# Patient Record
Sex: FEMALE | Race: WHITE | NOT HISPANIC OR LATINO | Employment: FULL TIME | URBAN - METROPOLITAN AREA
[De-identification: names, ages, dates, MRNs, and addresses within clinical notes are randomized per-mention and may not be internally consistent; named-entity substitution may affect disease eponyms.]

---

## 2018-11-07 ENCOUNTER — HOSPITAL ENCOUNTER (EMERGENCY)
Facility: HOSPITAL | Age: 48
Discharge: HOME | End: 2018-11-07
Attending: EMERGENCY MEDICINE

## 2018-11-07 VITALS
SYSTOLIC BLOOD PRESSURE: 155 MMHG | TEMPERATURE: 96 F | BODY MASS INDEX: 28.63 KG/M2 | OXYGEN SATURATION: 97 % | HEART RATE: 118 BPM | HEIGHT: 70 IN | WEIGHT: 200 LBS | DIASTOLIC BLOOD PRESSURE: 75 MMHG | RESPIRATION RATE: 18 BRPM

## 2018-11-07 DIAGNOSIS — T78.40XA ALLERGIC REACTION, INITIAL ENCOUNTER: Primary | ICD-10-CM

## 2018-11-07 PROCEDURE — 99284 EMERGENCY DEPT VISIT MOD MDM: CPT | Mod: 25

## 2018-11-07 PROCEDURE — 25000003 PHARM REV CODE 250: Performed by: EMERGENCY MEDICINE

## 2018-11-07 PROCEDURE — 63600175 PHARM REV CODE 636 W HCPCS: Performed by: EMERGENCY MEDICINE

## 2018-11-07 PROCEDURE — 96372 THER/PROPH/DIAG INJ SC/IM: CPT

## 2018-11-07 RX ORDER — DIPHENHYDRAMINE HCL 50 MG
50 CAPSULE ORAL
Status: COMPLETED | OUTPATIENT
Start: 2018-11-07 | End: 2018-11-07

## 2018-11-07 RX ORDER — DEXAMETHASONE SODIUM PHOSPHATE 4 MG/ML
12 INJECTION, SOLUTION INTRA-ARTICULAR; INTRALESIONAL; INTRAMUSCULAR; INTRAVENOUS; SOFT TISSUE
Status: COMPLETED | OUTPATIENT
Start: 2018-11-07 | End: 2018-11-07

## 2018-11-07 RX ORDER — FAMOTIDINE 20 MG/1
20 TABLET, FILM COATED ORAL 2 TIMES DAILY
Qty: 20 TABLET | Refills: 0 | Status: ON HOLD | OUTPATIENT
Start: 2018-11-07 | End: 2020-12-10

## 2018-11-07 RX ORDER — METHYLPREDNISOLONE 4 MG/1
TABLET ORAL
Qty: 1 PACKAGE | Refills: 0 | Status: SHIPPED | OUTPATIENT
Start: 2018-11-07 | End: 2018-11-28

## 2018-11-07 RX ORDER — FAMOTIDINE 20 MG/1
40 TABLET, FILM COATED ORAL
Status: COMPLETED | OUTPATIENT
Start: 2018-11-07 | End: 2018-11-07

## 2018-11-07 RX ORDER — DIPHENHYDRAMINE HCL 25 MG
25 CAPSULE ORAL EVERY 6 HOURS PRN
Qty: 20 CAPSULE | Refills: 0 | Status: ON HOLD | COMMUNITY
Start: 2018-11-07 | End: 2020-12-11

## 2018-11-07 RX ADMIN — DIPHENHYDRAMINE HYDROCHLORIDE 50 MG: 50 CAPSULE ORAL at 09:11

## 2018-11-07 RX ADMIN — DEXAMETHASONE SODIUM PHOSPHATE 12 MG: 4 INJECTION, SOLUTION INTRA-ARTICULAR; INTRALESIONAL; INTRAMUSCULAR; INTRAVENOUS; SOFT TISSUE at 09:11

## 2018-11-07 RX ADMIN — FAMOTIDINE 40 MG: 20 TABLET ORAL at 09:11

## 2018-11-08 NOTE — ED PROVIDER NOTES
Encounter Date: 11/7/2018    SCRIBE #1 NOTE: I, Barbara Rodrigues, am scribing for, and in the presence of, Dr. Flowers.       History     Chief Complaint   Patient presents with    Allergic Reaction     face and neck swelling, itching and redness since last night - worsening today       Time seen by provider: 9:09 PM on 11/07/2018    The patient is a 48 y.o. female who presents to the ED with complaint of sudden onset of hives on her face this morning with associated facial swelling, itchiness, redness, and sore throat. She denies chemical exposure, bug bites, new foods, or recent medication change. She reports she went to a [employer] yesterday. Benadryl has not provided relief. The patient denies difficulty swallowing or any other symptoms at this time. No PMHx noted. PSHx of hysterectomy noted. No Hx of DM.       The history is provided by the patient and a relative.     Review of patient's allergies indicates:   Allergen Reactions    Penicillins      History reviewed. No pertinent past medical history.  Past Surgical History:   Procedure Laterality Date    HYSTERECTOMY       History reviewed. No pertinent family history.  Social History     Tobacco Use    Smoking status: Current Some Day Smoker   Substance Use Topics    Alcohol use: No     Frequency: Never    Drug use: No     Review of Systems   Constitutional: Negative for fever.   HENT: Positive for facial swelling and sore throat.    Respiratory: Negative for shortness of breath.    Cardiovascular: Negative for chest pain.   Gastrointestinal: Negative for nausea.   Genitourinary: Negative for dysuria.   Musculoskeletal: Negative for back pain.   Skin: Positive for color change (erythema) and rash.        +pruritus   Neurological: Negative for weakness.   Hematological: Does not bruise/bleed easily.       Physical Exam     Initial Vitals [11/07/18 2021]   BP Pulse Resp Temp SpO2   (!) 155/75 (!) 118 18 96.4 °F (35.8 °C) 97 %      MAP       --          Physical Exam    Nursing note and vitals reviewed.  Constitutional: She appears well-developed and well-nourished.  Non-toxic appearance. No distress.   HENT:   Head: Normocephalic and atraumatic.   Mouth/Throat: Oropharynx is clear and moist.   Eyes: EOM are normal. Pupils are equal, round, and reactive to light.   Neck: Normal range of motion. Neck supple. No neck rigidity. No JVD present.   Cardiovascular: Normal rate, regular rhythm, normal heart sounds and intact distal pulses. Exam reveals no gallop and no friction rub.    No murmur heard.  Pulmonary/Chest: Breath sounds normal. She has no wheezes. She has no rhonchi. She has no rales.   Abdominal: Soft. Bowel sounds are normal. She exhibits no distension. There is no tenderness. There is no rigidity, no rebound and no guarding.   Musculoskeletal: Normal range of motion.   Neurological: She is alert and oriented to person, place, and time. She has normal strength and normal reflexes. No cranial nerve deficit or sensory deficit. She exhibits normal muscle tone. Coordination normal. GCS eye subscore is 4. GCS verbal subscore is 5. GCS motor subscore is 6.   Skin: Skin is warm and dry.   Swelling and erythema with hives to face and neck.   Psychiatric: She has a normal mood and affect. Her speech is normal and behavior is normal. She is not actively hallucinating.         ED Course   Procedures  Labs Reviewed - No data to display       Imaging Results    None          Medical Decision Making:   History:   Old Medical Records: I decided to obtain old medical records.  Initial Assessment:   This is an emergent evaluation for rash, ALLERGIC reaction  Differential diagnosis includes but is not limited to Anaphylaxis, infectious urticaria, angioedema, contact dermatitis, ALLERGIC dermatitis  My impression is urticaria    The patient has a rash that is consistent with urticaria.  She has no other organ involvement no wheezing, shortness of breath, abdominal pain,  nausea, vomiting or any other complaints.   Symptoms have resolved after given steroids, Benadryl, and H2 blocker.  Patient will be discharged home with a course of steroids, Benadryl, and H2 blocker.  I will provide ALLERGY and immunology referral if indicated. Patient is given strict return precautions to the ED if she starts to develop any other secondary symptoms.  They are discharged improved and in no acute distress    Len Flowers MD              Scribe Attestation:   Scribe #1: I performed the above scribed service and the documentation accurately describes the services I performed. I attest to the accuracy of the note.    I, Lionel Child, personally performed the services described in this documentation. All medical record entries made by the scribe were at my direction and in my presence.  I have reviewed the chart and agree that the record reflects my personal performance and is accurate and complete. Len Flowers MD.  1:47 AM 11/08/2018             Clinical Impression:   The encounter diagnosis was Allergic reaction, initial encounter.                             Len Flowers MD  11/08/18 0147

## 2020-11-23 ENCOUNTER — HOSPITAL ENCOUNTER (INPATIENT)
Facility: HOSPITAL | Age: 50
LOS: 1 days | Discharge: HOME | DRG: 661 | End: 2020-11-24
Attending: EMERGENCY MEDICINE | Admitting: INTERNAL MEDICINE

## 2020-11-23 DIAGNOSIS — Z01.818 PRE-OP EXAM: ICD-10-CM

## 2020-11-23 DIAGNOSIS — N20.0 KIDNEY STONE: Primary | ICD-10-CM

## 2020-11-23 DIAGNOSIS — N13.9 OBSTRUCTIVE UROPATHY: ICD-10-CM

## 2020-11-23 PROBLEM — R52 INTRACTABLE PAIN: Status: ACTIVE | Noted: 2020-11-23

## 2020-11-23 PROBLEM — F17.200 NICOTINE USE DISORDER: Status: ACTIVE | Noted: 2020-11-23

## 2020-11-23 PROBLEM — N13.2 HYDRONEPHROSIS WITH URINARY OBSTRUCTION DUE TO URETERAL CALCULUS: Status: ACTIVE | Noted: 2020-11-23

## 2020-11-23 LAB
ALBUMIN SERPL BCP-MCNC: 4.1 G/DL (ref 3.5–5.2)
ALP SERPL-CCNC: 87 U/L (ref 55–135)
ALT SERPL W/O P-5'-P-CCNC: 21 U/L (ref 10–44)
ANION GAP SERPL CALC-SCNC: 9 MMOL/L (ref 8–16)
AST SERPL-CCNC: 15 U/L (ref 10–40)
BACTERIA #/AREA URNS HPF: NEGATIVE /HPF
BASOPHILS # BLD AUTO: 0.05 K/UL (ref 0–0.2)
BASOPHILS NFR BLD: 0.5 % (ref 0–1.9)
BILIRUB SERPL-MCNC: 0.7 MG/DL (ref 0.1–1)
BILIRUB UR QL STRIP: NEGATIVE
BUN SERPL-MCNC: 16 MG/DL (ref 6–20)
CALCIUM SERPL-MCNC: 9.6 MG/DL (ref 8.7–10.5)
CHLORIDE SERPL-SCNC: 106 MMOL/L (ref 95–110)
CLARITY UR: CLEAR
CO2 SERPL-SCNC: 25 MMOL/L (ref 23–29)
COLOR UR: YELLOW
CREAT SERPL-MCNC: 0.7 MG/DL (ref 0.5–1.4)
DIFFERENTIAL METHOD: ABNORMAL
EOSINOPHIL # BLD AUTO: 0.1 K/UL (ref 0–0.5)
EOSINOPHIL NFR BLD: 1.2 % (ref 0–8)
ERYTHROCYTE [DISTWIDTH] IN BLOOD BY AUTOMATED COUNT: 12.2 % (ref 11.5–14.5)
EST. GFR  (AFRICAN AMERICAN): >60 ML/MIN/1.73 M^2
EST. GFR  (NON AFRICAN AMERICAN): >60 ML/MIN/1.73 M^2
GLUCOSE SERPL-MCNC: 109 MG/DL (ref 70–110)
GLUCOSE UR QL STRIP: NEGATIVE
HCT VFR BLD AUTO: 42.5 % (ref 37–48.5)
HGB BLD-MCNC: 14 G/DL (ref 12–16)
HGB UR QL STRIP: ABNORMAL
HYALINE CASTS #/AREA URNS LPF: 7 /LPF
IMM GRANULOCYTES # BLD AUTO: 0.02 K/UL (ref 0–0.04)
IMM GRANULOCYTES NFR BLD AUTO: 0.2 % (ref 0–0.5)
KETONES UR QL STRIP: NEGATIVE
LEUKOCYTE ESTERASE UR QL STRIP: ABNORMAL
LYMPHOCYTES # BLD AUTO: 4.3 K/UL (ref 1–4.8)
LYMPHOCYTES NFR BLD: 42.4 % (ref 18–48)
MCH RBC QN AUTO: 32.6 PG (ref 27–31)
MCHC RBC AUTO-ENTMCNC: 32.9 G/DL (ref 32–36)
MCV RBC AUTO: 99 FL (ref 82–98)
MICROSCOPIC COMMENT: ABNORMAL
MONOCYTES # BLD AUTO: 1 K/UL (ref 0.3–1)
MONOCYTES NFR BLD: 10 % (ref 4–15)
NEUTROPHILS # BLD AUTO: 4.6 K/UL (ref 1.8–7.7)
NEUTROPHILS NFR BLD: 45.7 % (ref 38–73)
NITRITE UR QL STRIP: NEGATIVE
NRBC BLD-RTO: 0 /100 WBC
PH UR STRIP: 7 [PH] (ref 5–8)
PLATELET # BLD AUTO: 257 K/UL (ref 150–350)
PMV BLD AUTO: 9.6 FL (ref 9.2–12.9)
POTASSIUM SERPL-SCNC: 3.8 MMOL/L (ref 3.5–5.1)
PROT SERPL-MCNC: 6.7 G/DL (ref 6–8.4)
PROT UR QL STRIP: ABNORMAL
RBC # BLD AUTO: 4.3 M/UL (ref 4–5.4)
RBC #/AREA URNS HPF: 4 /HPF (ref 0–4)
SARS-COV-2 RDRP RESP QL NAA+PROBE: NEGATIVE
SODIUM SERPL-SCNC: 140 MMOL/L (ref 136–145)
SP GR UR STRIP: 1.01 (ref 1–1.03)
SQUAMOUS #/AREA URNS HPF: 2 /HPF
URN SPEC COLLECT METH UR: ABNORMAL
UROBILINOGEN UR STRIP-ACNC: NEGATIVE EU/DL
WBC # BLD AUTO: 10.07 K/UL (ref 3.9–12.7)
WBC #/AREA URNS HPF: 17 /HPF (ref 0–5)

## 2020-11-23 PROCEDURE — 12000002 HC ACUTE/MED SURGE SEMI-PRIVATE ROOM

## 2020-11-23 PROCEDURE — 25000003 PHARM REV CODE 250: Performed by: NURSE PRACTITIONER

## 2020-11-23 PROCEDURE — 36415 COLL VENOUS BLD VENIPUNCTURE: CPT

## 2020-11-23 PROCEDURE — 93010 EKG 12-LEAD: ICD-10-PCS | Mod: ,,, | Performed by: SPECIALIST

## 2020-11-23 PROCEDURE — 93005 ELECTROCARDIOGRAM TRACING: CPT | Performed by: SPECIALIST

## 2020-11-23 PROCEDURE — 99285 EMERGENCY DEPT VISIT HI MDM: CPT | Mod: 25

## 2020-11-23 PROCEDURE — 81001 URINALYSIS AUTO W/SCOPE: CPT

## 2020-11-23 PROCEDURE — 80053 COMPREHEN METABOLIC PANEL: CPT

## 2020-11-23 PROCEDURE — U0002 COVID-19 LAB TEST NON-CDC: HCPCS

## 2020-11-23 PROCEDURE — 63600175 PHARM REV CODE 636 W HCPCS: Performed by: EMERGENCY MEDICINE

## 2020-11-23 PROCEDURE — 93010 ELECTROCARDIOGRAM REPORT: CPT | Mod: ,,, | Performed by: SPECIALIST

## 2020-11-23 PROCEDURE — 96374 THER/PROPH/DIAG INJ IV PUSH: CPT

## 2020-11-23 PROCEDURE — 96375 TX/PRO/DX INJ NEW DRUG ADDON: CPT

## 2020-11-23 PROCEDURE — 87086 URINE CULTURE/COLONY COUNT: CPT

## 2020-11-23 PROCEDURE — 85025 COMPLETE CBC W/AUTO DIFF WBC: CPT

## 2020-11-23 RX ORDER — ACETAMINOPHEN 325 MG/1
650 TABLET ORAL EVERY 4 HOURS PRN
Status: DISCONTINUED | OUTPATIENT
Start: 2020-11-23 | End: 2020-11-24 | Stop reason: HOSPADM

## 2020-11-23 RX ORDER — POTASSIUM CHLORIDE 7.45 MG/ML
40 INJECTION INTRAVENOUS
Status: DISCONTINUED | OUTPATIENT
Start: 2020-11-23 | End: 2020-11-24 | Stop reason: HOSPADM

## 2020-11-23 RX ORDER — POTASSIUM CHLORIDE 7.45 MG/ML
20 INJECTION INTRAVENOUS
Status: DISCONTINUED | OUTPATIENT
Start: 2020-11-23 | End: 2020-11-24 | Stop reason: HOSPADM

## 2020-11-23 RX ORDER — SODIUM CHLORIDE 0.9 % (FLUSH) 0.9 %
10 SYRINGE (ML) INJECTION
Status: DISCONTINUED | OUTPATIENT
Start: 2020-11-23 | End: 2020-11-24 | Stop reason: HOSPADM

## 2020-11-23 RX ORDER — HYDROCODONE BITARTRATE AND ACETAMINOPHEN 5; 325 MG/1; MG/1
1 TABLET ORAL EVERY 6 HOURS PRN
Status: DISCONTINUED | OUTPATIENT
Start: 2020-11-23 | End: 2020-11-24 | Stop reason: HOSPADM

## 2020-11-23 RX ORDER — TALC
6 POWDER (GRAM) TOPICAL NIGHTLY PRN
Status: DISCONTINUED | OUTPATIENT
Start: 2020-11-23 | End: 2020-11-24 | Stop reason: HOSPADM

## 2020-11-23 RX ORDER — MAGNESIUM SULFATE HEPTAHYDRATE 40 MG/ML
4 INJECTION, SOLUTION INTRAVENOUS
Status: DISCONTINUED | OUTPATIENT
Start: 2020-11-23 | End: 2020-11-24 | Stop reason: HOSPADM

## 2020-11-23 RX ORDER — ONDANSETRON 2 MG/ML
4 INJECTION INTRAMUSCULAR; INTRAVENOUS EVERY 8 HOURS PRN
Status: DISCONTINUED | OUTPATIENT
Start: 2020-11-23 | End: 2020-11-24 | Stop reason: HOSPADM

## 2020-11-23 RX ORDER — KETOROLAC TROMETHAMINE 30 MG/ML
15 INJECTION, SOLUTION INTRAMUSCULAR; INTRAVENOUS EVERY 6 HOURS PRN
Status: DISCONTINUED | OUTPATIENT
Start: 2020-11-23 | End: 2020-11-24 | Stop reason: HOSPADM

## 2020-11-23 RX ORDER — LANOLIN ALCOHOL/MO/W.PET/CERES
800 CREAM (GRAM) TOPICAL
Status: DISCONTINUED | OUTPATIENT
Start: 2020-11-23 | End: 2020-11-24 | Stop reason: HOSPADM

## 2020-11-23 RX ORDER — DIPHENHYDRAMINE HCL 25 MG
25 CAPSULE ORAL EVERY 6 HOURS PRN
Status: DISCONTINUED | OUTPATIENT
Start: 2020-11-23 | End: 2020-11-24 | Stop reason: HOSPADM

## 2020-11-23 RX ORDER — MAGNESIUM SULFATE HEPTAHYDRATE 40 MG/ML
2 INJECTION, SOLUTION INTRAVENOUS
Status: DISCONTINUED | OUTPATIENT
Start: 2020-11-23 | End: 2020-11-24 | Stop reason: HOSPADM

## 2020-11-23 RX ORDER — LEVOFLOXACIN 5 MG/ML
750 INJECTION, SOLUTION INTRAVENOUS
Status: DISCONTINUED | OUTPATIENT
Start: 2020-11-24 | End: 2020-11-24 | Stop reason: HOSPADM

## 2020-11-23 RX ORDER — POTASSIUM CHLORIDE 20 MEQ/1
20 TABLET, EXTENDED RELEASE ORAL
Status: DISCONTINUED | OUTPATIENT
Start: 2020-11-23 | End: 2020-11-24 | Stop reason: HOSPADM

## 2020-11-23 RX ORDER — POTASSIUM CHLORIDE 20 MEQ/1
40 TABLET, EXTENDED RELEASE ORAL
Status: DISCONTINUED | OUTPATIENT
Start: 2020-11-23 | End: 2020-11-24 | Stop reason: HOSPADM

## 2020-11-23 RX ORDER — TAMSULOSIN HYDROCHLORIDE 0.4 MG/1
0.4 CAPSULE ORAL DAILY
Status: DISCONTINUED | OUTPATIENT
Start: 2020-11-23 | End: 2020-11-24 | Stop reason: HOSPADM

## 2020-11-23 RX ORDER — MAGNESIUM SULFATE 1 G/100ML
1 INJECTION INTRAVENOUS
Status: DISCONTINUED | OUTPATIENT
Start: 2020-11-23 | End: 2020-11-24 | Stop reason: HOSPADM

## 2020-11-23 RX ORDER — FAMOTIDINE 20 MG/1
20 TABLET, FILM COATED ORAL 2 TIMES DAILY
Status: DISCONTINUED | OUTPATIENT
Start: 2020-11-23 | End: 2020-11-24 | Stop reason: HOSPADM

## 2020-11-23 RX ORDER — HYDROMORPHONE HYDROCHLORIDE 1 MG/ML
0.5 INJECTION, SOLUTION INTRAMUSCULAR; INTRAVENOUS; SUBCUTANEOUS
Status: DISCONTINUED | OUTPATIENT
Start: 2020-11-23 | End: 2020-11-23

## 2020-11-23 RX ORDER — HYDROMORPHONE HYDROCHLORIDE 1 MG/ML
0.5 INJECTION, SOLUTION INTRAMUSCULAR; INTRAVENOUS; SUBCUTANEOUS
Status: COMPLETED | OUTPATIENT
Start: 2020-11-23 | End: 2020-11-23

## 2020-11-23 RX ORDER — ONDANSETRON 2 MG/ML
4 INJECTION INTRAMUSCULAR; INTRAVENOUS
Status: COMPLETED | OUTPATIENT
Start: 2020-11-23 | End: 2020-11-23

## 2020-11-23 RX ORDER — SODIUM CHLORIDE 9 MG/ML
INJECTION, SOLUTION INTRAVENOUS CONTINUOUS
Status: DISCONTINUED | OUTPATIENT
Start: 2020-11-23 | End: 2020-11-24 | Stop reason: HOSPADM

## 2020-11-23 RX ORDER — HYDROMORPHONE HYDROCHLORIDE 1 MG/ML
0.5 INJECTION, SOLUTION INTRAMUSCULAR; INTRAVENOUS; SUBCUTANEOUS EVERY 4 HOURS PRN
Status: DISCONTINUED | OUTPATIENT
Start: 2020-11-23 | End: 2020-11-24 | Stop reason: HOSPADM

## 2020-11-23 RX ORDER — LEVOFLOXACIN 5 MG/ML
750 INJECTION, SOLUTION INTRAVENOUS
Status: COMPLETED | OUTPATIENT
Start: 2020-11-23 | End: 2020-11-23

## 2020-11-23 RX ADMIN — HYDROCODONE BITARTRATE AND ACETAMINOPHEN 1 TABLET: 5; 325 TABLET ORAL at 11:11

## 2020-11-23 RX ADMIN — SODIUM CHLORIDE: 0.9 INJECTION, SOLUTION INTRAVENOUS at 11:11

## 2020-11-23 RX ADMIN — LEVOFLOXACIN 750 MG: 750 INJECTION, SOLUTION INTRAVENOUS at 08:11

## 2020-11-23 RX ADMIN — HYDROMORPHONE HYDROCHLORIDE 0.5 MG: 1 INJECTION, SOLUTION INTRAMUSCULAR; INTRAVENOUS; SUBCUTANEOUS at 08:11

## 2020-11-23 RX ADMIN — TAMSULOSIN HYDROCHLORIDE 0.4 MG: 0.4 CAPSULE ORAL at 11:11

## 2020-11-23 RX ADMIN — FAMOTIDINE 20 MG: 20 TABLET ORAL at 11:11

## 2020-11-23 RX ADMIN — SODIUM CHLORIDE 1000 ML: 0.9 INJECTION, SOLUTION INTRAVENOUS at 08:11

## 2020-11-23 RX ADMIN — ONDANSETRON 4 MG: 2 INJECTION INTRAMUSCULAR; INTRAVENOUS at 08:11

## 2020-11-24 ENCOUNTER — ANESTHESIA EVENT (OUTPATIENT)
Dept: SURGERY | Facility: HOSPITAL | Age: 50
DRG: 661 | End: 2020-11-24

## 2020-11-24 ENCOUNTER — TELEPHONE (OUTPATIENT)
Dept: UROLOGY | Facility: CLINIC | Age: 50
End: 2020-11-24

## 2020-11-24 ENCOUNTER — ANESTHESIA (OUTPATIENT)
Dept: SURGERY | Facility: HOSPITAL | Age: 50
DRG: 661 | End: 2020-11-24

## 2020-11-24 VITALS
RESPIRATION RATE: 18 BRPM | BODY MASS INDEX: 30.85 KG/M2 | TEMPERATURE: 98 F | HEIGHT: 69 IN | SYSTOLIC BLOOD PRESSURE: 106 MMHG | OXYGEN SATURATION: 98 % | HEART RATE: 58 BPM | DIASTOLIC BLOOD PRESSURE: 72 MMHG | WEIGHT: 208.31 LBS

## 2020-11-24 PROBLEM — R52 INTRACTABLE PAIN: Status: RESOLVED | Noted: 2020-11-23 | Resolved: 2020-11-24

## 2020-11-24 LAB
ANION GAP SERPL CALC-SCNC: 7 MMOL/L (ref 8–16)
BASOPHILS # BLD AUTO: 0.03 K/UL (ref 0–0.2)
BASOPHILS NFR BLD: 0.4 % (ref 0–1.9)
BUN SERPL-MCNC: 12 MG/DL (ref 6–20)
CALCIUM SERPL-MCNC: 8.9 MG/DL (ref 8.7–10.5)
CHLORIDE SERPL-SCNC: 108 MMOL/L (ref 95–110)
CO2 SERPL-SCNC: 24 MMOL/L (ref 23–29)
CREAT SERPL-MCNC: 0.6 MG/DL (ref 0.5–1.4)
DIFFERENTIAL METHOD: ABNORMAL
EOSINOPHIL # BLD AUTO: 0.2 K/UL (ref 0–0.5)
EOSINOPHIL NFR BLD: 1.9 % (ref 0–8)
ERYTHROCYTE [DISTWIDTH] IN BLOOD BY AUTOMATED COUNT: 12.2 % (ref 11.5–14.5)
EST. GFR  (AFRICAN AMERICAN): >60 ML/MIN/1.73 M^2
EST. GFR  (NON AFRICAN AMERICAN): >60 ML/MIN/1.73 M^2
GLUCOSE SERPL-MCNC: 129 MG/DL (ref 70–110)
HCT VFR BLD AUTO: 38.1 % (ref 37–48.5)
HGB BLD-MCNC: 12.6 G/DL (ref 12–16)
IMM GRANULOCYTES # BLD AUTO: 0.02 K/UL (ref 0–0.04)
IMM GRANULOCYTES NFR BLD AUTO: 0.2 % (ref 0–0.5)
LYMPHOCYTES # BLD AUTO: 3.5 K/UL (ref 1–4.8)
LYMPHOCYTES NFR BLD: 42.9 % (ref 18–48)
MAGNESIUM SERPL-MCNC: 2.1 MG/DL (ref 1.6–2.6)
MCH RBC QN AUTO: 32.9 PG (ref 27–31)
MCHC RBC AUTO-ENTMCNC: 33.1 G/DL (ref 32–36)
MCV RBC AUTO: 100 FL (ref 82–98)
MONOCYTES # BLD AUTO: 0.9 K/UL (ref 0.3–1)
MONOCYTES NFR BLD: 11.1 % (ref 4–15)
NEUTROPHILS # BLD AUTO: 3.6 K/UL (ref 1.8–7.7)
NEUTROPHILS NFR BLD: 43.5 % (ref 38–73)
NRBC BLD-RTO: 0 /100 WBC
PLATELET # BLD AUTO: 215 K/UL (ref 150–350)
PMV BLD AUTO: 9.5 FL (ref 9.2–12.9)
POTASSIUM SERPL-SCNC: 4.1 MMOL/L (ref 3.5–5.1)
RBC # BLD AUTO: 3.83 M/UL (ref 4–5.4)
SODIUM SERPL-SCNC: 139 MMOL/L (ref 136–145)
WBC # BLD AUTO: 8.21 K/UL (ref 3.9–12.7)

## 2020-11-24 PROCEDURE — 27000673 HC TUBING BLOOD Y: Performed by: ANESTHESIOLOGY

## 2020-11-24 PROCEDURE — 74420 PR  X-RAY RETROGRADE PYELOGRAM: ICD-10-PCS | Mod: 26,,, | Performed by: STUDENT IN AN ORGANIZED HEALTH CARE EDUCATION/TRAINING PROGRAM

## 2020-11-24 PROCEDURE — 52332 CYSTOSCOPY AND TREATMENT: CPT | Mod: RT,,, | Performed by: STUDENT IN AN ORGANIZED HEALTH CARE EDUCATION/TRAINING PROGRAM

## 2020-11-24 PROCEDURE — 71000039 HC RECOVERY, EACH ADD'L HOUR: Performed by: STUDENT IN AN ORGANIZED HEALTH CARE EDUCATION/TRAINING PROGRAM

## 2020-11-24 PROCEDURE — 63600175 PHARM REV CODE 636 W HCPCS: Performed by: NURSE ANESTHETIST, CERTIFIED REGISTERED

## 2020-11-24 PROCEDURE — 27000671 HC TUBING MICROBORE EXT: Performed by: ANESTHESIOLOGY

## 2020-11-24 PROCEDURE — 63600175 PHARM REV CODE 636 W HCPCS: Performed by: NURSE PRACTITIONER

## 2020-11-24 PROCEDURE — 25000003 PHARM REV CODE 250: Performed by: NURSE PRACTITIONER

## 2020-11-24 PROCEDURE — 80048 BASIC METABOLIC PNL TOTAL CA: CPT

## 2020-11-24 PROCEDURE — 36000706: Performed by: STUDENT IN AN ORGANIZED HEALTH CARE EDUCATION/TRAINING PROGRAM

## 2020-11-24 PROCEDURE — 71000033 HC RECOVERY, INTIAL HOUR: Performed by: STUDENT IN AN ORGANIZED HEALTH CARE EDUCATION/TRAINING PROGRAM

## 2020-11-24 PROCEDURE — 25000003 PHARM REV CODE 250: Performed by: NURSE ANESTHETIST, CERTIFIED REGISTERED

## 2020-11-24 PROCEDURE — 37000009 HC ANESTHESIA EA ADD 15 MINS: Performed by: STUDENT IN AN ORGANIZED HEALTH CARE EDUCATION/TRAINING PROGRAM

## 2020-11-24 PROCEDURE — 99223 1ST HOSP IP/OBS HIGH 75: CPT | Mod: 25,,, | Performed by: STUDENT IN AN ORGANIZED HEALTH CARE EDUCATION/TRAINING PROGRAM

## 2020-11-24 PROCEDURE — 74420 UROGRAPHY RTRGR +-KUB: CPT | Mod: 26,,, | Performed by: STUDENT IN AN ORGANIZED HEALTH CARE EDUCATION/TRAINING PROGRAM

## 2020-11-24 PROCEDURE — 27202107 HC XP QUATRO SENSOR: Performed by: ANESTHESIOLOGY

## 2020-11-24 PROCEDURE — 27000284 HC CANNULA NASAL: Performed by: ANESTHESIOLOGY

## 2020-11-24 PROCEDURE — 25000003 PHARM REV CODE 250: Performed by: STUDENT IN AN ORGANIZED HEALTH CARE EDUCATION/TRAINING PROGRAM

## 2020-11-24 PROCEDURE — 52332 PR CYSTOSCOPY,INSERT URETERAL STENT: ICD-10-PCS | Mod: RT,,, | Performed by: STUDENT IN AN ORGANIZED HEALTH CARE EDUCATION/TRAINING PROGRAM

## 2020-11-24 PROCEDURE — 27201107 HC STYLET, STANDARD: Performed by: ANESTHESIOLOGY

## 2020-11-24 PROCEDURE — 99223 PR INITIAL HOSPITAL CARE,LEVL III: ICD-10-PCS | Mod: 25,,, | Performed by: STUDENT IN AN ORGANIZED HEALTH CARE EDUCATION/TRAINING PROGRAM

## 2020-11-24 PROCEDURE — C2617 STENT, NON-COR, TEM W/O DEL: HCPCS | Performed by: STUDENT IN AN ORGANIZED HEALTH CARE EDUCATION/TRAINING PROGRAM

## 2020-11-24 PROCEDURE — 37000008 HC ANESTHESIA 1ST 15 MINUTES: Performed by: STUDENT IN AN ORGANIZED HEALTH CARE EDUCATION/TRAINING PROGRAM

## 2020-11-24 PROCEDURE — C1769 GUIDE WIRE: HCPCS | Performed by: STUDENT IN AN ORGANIZED HEALTH CARE EDUCATION/TRAINING PROGRAM

## 2020-11-24 PROCEDURE — 85025 COMPLETE CBC W/AUTO DIFF WBC: CPT

## 2020-11-24 PROCEDURE — 83735 ASSAY OF MAGNESIUM: CPT

## 2020-11-24 PROCEDURE — 36415 COLL VENOUS BLD VENIPUNCTURE: CPT

## 2020-11-24 PROCEDURE — 27201423 OPTIME MED/SURG SUP & DEVICES STERILE SUPPLY: Performed by: STUDENT IN AN ORGANIZED HEALTH CARE EDUCATION/TRAINING PROGRAM

## 2020-11-24 PROCEDURE — 25500020 PHARM REV CODE 255: Performed by: STUDENT IN AN ORGANIZED HEALTH CARE EDUCATION/TRAINING PROGRAM

## 2020-11-24 PROCEDURE — 36000707: Performed by: STUDENT IN AN ORGANIZED HEALTH CARE EDUCATION/TRAINING PROGRAM

## 2020-11-24 DEVICE — STENT URETERAL FIRM 6FX26CM ASCERTA: Type: IMPLANTABLE DEVICE | Site: URETER | Status: FUNCTIONAL

## 2020-11-24 RX ORDER — FENTANYL CITRATE 50 UG/ML
INJECTION, SOLUTION INTRAMUSCULAR; INTRAVENOUS
Status: DISCONTINUED | OUTPATIENT
Start: 2020-11-24 | End: 2020-11-24

## 2020-11-24 RX ORDER — LIDOCAINE HYDROCHLORIDE 20 MG/ML
JELLY TOPICAL
Status: DISCONTINUED | OUTPATIENT
Start: 2020-11-24 | End: 2020-11-24 | Stop reason: HOSPADM

## 2020-11-24 RX ORDER — LIDOCAINE HYDROCHLORIDE 20 MG/ML
INJECTION, SOLUTION EPIDURAL; INFILTRATION; INTRACAUDAL; PERINEURAL
Status: DISCONTINUED | OUTPATIENT
Start: 2020-11-24 | End: 2020-11-24

## 2020-11-24 RX ORDER — DEXAMETHASONE SODIUM PHOSPHATE 4 MG/ML
INJECTION, SOLUTION INTRA-ARTICULAR; INTRALESIONAL; INTRAMUSCULAR; INTRAVENOUS; SOFT TISSUE
Status: DISCONTINUED | OUTPATIENT
Start: 2020-11-24 | End: 2020-11-24

## 2020-11-24 RX ORDER — SUCCINYLCHOLINE CHLORIDE 20 MG/ML
INJECTION INTRAMUSCULAR; INTRAVENOUS
Status: DISCONTINUED | OUTPATIENT
Start: 2020-11-24 | End: 2020-11-24

## 2020-11-24 RX ORDER — TAMSULOSIN HYDROCHLORIDE 0.4 MG/1
0.4 CAPSULE ORAL DAILY
Qty: 30 CAPSULE | Refills: 1 | Status: ON HOLD | OUTPATIENT
Start: 2020-11-25 | End: 2020-12-10

## 2020-11-24 RX ORDER — PHENYLEPHRINE HYDROCHLORIDE 10 MG/ML
INJECTION INTRAVENOUS
Status: DISCONTINUED | OUTPATIENT
Start: 2020-11-24 | End: 2020-11-24

## 2020-11-24 RX ORDER — SODIUM CHLORIDE, SODIUM LACTATE, POTASSIUM CHLORIDE, CALCIUM CHLORIDE 600; 310; 30; 20 MG/100ML; MG/100ML; MG/100ML; MG/100ML
INJECTION, SOLUTION INTRAVENOUS CONTINUOUS PRN
Status: DISCONTINUED | OUTPATIENT
Start: 2020-11-24 | End: 2020-11-24

## 2020-11-24 RX ORDER — MIDAZOLAM HYDROCHLORIDE 1 MG/ML
INJECTION INTRAMUSCULAR; INTRAVENOUS
Status: DISCONTINUED | OUTPATIENT
Start: 2020-11-24 | End: 2020-11-24

## 2020-11-24 RX ORDER — PROPOFOL 10 MG/ML
VIAL (ML) INTRAVENOUS
Status: DISCONTINUED | OUTPATIENT
Start: 2020-11-24 | End: 2020-11-24

## 2020-11-24 RX ORDER — LEVOFLOXACIN 750 MG/1
750 TABLET ORAL DAILY
Qty: 7 TABLET | Refills: 0 | Status: ON HOLD | OUTPATIENT
Start: 2020-11-24 | End: 2020-12-12 | Stop reason: HOSPADM

## 2020-11-24 RX ORDER — LIDOCAINE HYDROCHLORIDE 20 MG/ML
JELLY TOPICAL
Status: DISCONTINUED | OUTPATIENT
Start: 2020-11-24 | End: 2020-11-24

## 2020-11-24 RX ADMIN — SUCCINYLCHOLINE CHLORIDE 160 MG: 20 INJECTION, SOLUTION INTRAMUSCULAR; INTRAVENOUS at 12:11

## 2020-11-24 RX ADMIN — FAMOTIDINE 20 MG: 20 TABLET ORAL at 07:11

## 2020-11-24 RX ADMIN — KETOROLAC TROMETHAMINE 15 MG: 30 INJECTION, SOLUTION INTRAMUSCULAR; INTRAVENOUS at 11:11

## 2020-11-24 RX ADMIN — PROPOFOL 150 MG: 10 INJECTION, EMULSION INTRAVENOUS at 12:11

## 2020-11-24 RX ADMIN — PHENYLEPHRINE HYDROCHLORIDE 200 MCG: 10 INJECTION INTRAVENOUS at 01:11

## 2020-11-24 RX ADMIN — TAMSULOSIN HYDROCHLORIDE 0.4 MG: 0.4 CAPSULE ORAL at 07:11

## 2020-11-24 RX ADMIN — DEXAMETHASONE SODIUM PHOSPHATE 8 MG: 4 INJECTION, SOLUTION INTRAMUSCULAR; INTRAVENOUS at 01:11

## 2020-11-24 RX ADMIN — FENTANYL CITRATE 100 MCG: 50 INJECTION, SOLUTION INTRAMUSCULAR; INTRAVENOUS at 12:11

## 2020-11-24 RX ADMIN — ONDANSETRON 4 MG: 2 INJECTION INTRAMUSCULAR; INTRAVENOUS at 03:11

## 2020-11-24 RX ADMIN — LIDOCAINE HYDROCHLORIDE 100 MG: 20 INJECTION, SOLUTION EPIDURAL; INFILTRATION; INTRACAUDAL; PERINEURAL at 12:11

## 2020-11-24 RX ADMIN — MIDAZOLAM HYDROCHLORIDE 2 MG: 1 INJECTION, SOLUTION INTRAMUSCULAR; INTRAVENOUS at 12:11

## 2020-11-24 RX ADMIN — HYDROCODONE BITARTRATE AND ACETAMINOPHEN 1 TABLET: 5; 325 TABLET ORAL at 07:11

## 2020-11-24 RX ADMIN — LIDOCAINE HYDROCHLORIDE 5 ML: 20 JELLY TOPICAL at 12:11

## 2020-11-24 RX ADMIN — ONDANSETRON 4 MG: 2 INJECTION INTRAMUSCULAR; INTRAVENOUS at 12:11

## 2020-11-24 RX ADMIN — HYDROMORPHONE HYDROCHLORIDE 0.5 MG: 1 INJECTION, SOLUTION INTRAMUSCULAR; INTRAVENOUS; SUBCUTANEOUS at 03:11

## 2020-11-24 RX ADMIN — SODIUM CHLORIDE, SODIUM LACTATE, POTASSIUM CHLORIDE, AND CALCIUM CHLORIDE: .6; .31; .03; .02 INJECTION, SOLUTION INTRAVENOUS at 12:11

## 2020-11-24 NOTE — SUBJECTIVE & OBJECTIVE
Past Medical History:   Diagnosis Date    Kidney stone        Past Surgical History:   Procedure Laterality Date    HYSTERECTOMY         Review of patient's allergies indicates:   Allergen Reactions    Penicillins        Family History     None          Tobacco Use    Smoking status: Current Some Day Smoker     Packs/day: 0.50     Types: Cigarettes   Substance and Sexual Activity    Alcohol use: No     Frequency: Never    Drug use: No    Sexual activity: Not on file       Review of Systems   Constitutional: Positive for chills. Negative for fever.   HENT: Negative for trouble swallowing.    Respiratory: Negative for cough and shortness of breath.    Cardiovascular: Negative for chest pain and palpitations.   Gastrointestinal: Positive for nausea and vomiting. Negative for abdominal pain.   Genitourinary: Positive for flank pain and hematuria. Negative for difficulty urinating, dysuria, frequency and urgency.   Musculoskeletal: Positive for back pain.   Skin: Negative for rash.   Neurological: Negative for weakness.   Psychiatric/Behavioral: Negative for behavioral problems.       Objective:     Temp:  [98 °F (36.7 °C)-98.9 °F (37.2 °C)] 98.9 °F (37.2 °C)  Pulse:  [73-98] 73  Resp:  [17-20] 18  SpO2:  [95 %-99 %] 98 %  BP: ()/(57-87) 110/75     Body mass index is 30.77 kg/m².           Drains     None                 Physical Exam  Vitals signs reviewed.   Constitutional:       General: She is not in acute distress.     Appearance: Normal appearance. She is not ill-appearing.   HENT:      Head: Normocephalic and atraumatic.   Eyes:      General: No scleral icterus.  Cardiovascular:      Rate and Rhythm: Normal rate and regular rhythm.   Pulmonary:      Effort: Pulmonary effort is normal. No respiratory distress.   Abdominal:      General: There is no distension.      Palpations: Abdomen is soft.      Tenderness: There is no abdominal tenderness.      Comments: Mild right CVA tenderness   Skin:      General: Skin is warm and dry.      Coloration: Skin is not jaundiced.   Neurological:      General: No focal deficit present.      Mental Status: She is alert and oriented to person, place, and time.   Psychiatric:         Mood and Affect: Mood normal.         Behavior: Behavior normal.         Significant Labs:    BMP:  Recent Labs   Lab 11/23/20 1819 11/24/20  0502    139   K 3.8 4.1    108   CO2 25 24   BUN 16 12   CREATININE 0.7 0.6   CALCIUM 9.6 8.9       CBC:  Recent Labs   Lab 11/23/20 1819 11/24/20  0502   WBC 10.07 8.21   HGB 14.0 12.6   HCT 42.5 38.1    215       Urine Culture:   Recent Labs   Lab 11/23/20 1941   LABURIN No growth to date     Urine Studies:   Recent Labs   Lab 11/23/20 1941   COLORU Yellow   APPEARANCEUA Clear   PHUR 7.0   SPECGRAV 1.010   PROTEINUA 1+*   GLUCUA Negative   KETONESU Negative   BILIRUBINUA Negative   OCCULTUA 2+*   NITRITE Negative   UROBILINOGEN Negative   LEUKOCYTESUR 1+*   RBCUA 4   WBCUA 17*   BACTERIA Negative   SQUAMEPITHEL 2   HYALINECASTS 7*       Significant Imaging:  All pertinent imaging results/findings from the past 24 hours have been reviewed.  Per HPI

## 2020-11-24 NOTE — H&P
[facility]  History & Physical    DOS: 11/23/2020  9:34 PM      Patient Name: Tereza Weinstein  MRN: [id number]  Admission Date: 11/23/2020  Attending Physician: Dr. Olivo  Primary Care Provider: Primary Doctor No         Patient information was obtained from patient, caregiver / friend and ER records.     Subjective:     Principal Problem:Obstructive uropathy    Chief Complaint:   Chief Complaint   Patient presents with    Flank Pain     right flank, blood in urine, hx of kidney stone    Nausea        HPI: Ms. Weinstein is a 50 year old female with a history of kidney stones who presents today with complaints of rt flank pain. It is severe. It is associated with N/V, hematuria, frequency, and foul smelling urine. She denies fever, cough, diarrhea, SOB, or LOC. She states she's been having pain for about a week with nausea/vomiting. Today, she's vomited about 15 times. In the ED, she was found to have a 22 mm right renal calculus, possibly lodged at the right ureterovesicular junction given presence of right-sided hydronephrosis with right perinephric and periureteral edema on the CT of abd/pelvis. She is previous patient of Dr. Rizzo, but hasn't seen him in many years. Reports no previous anesthesia complications. Smokes 1/2 ppd for 30 years, drinks monthly, and denies recreational drug use.     Past Medical History:   Diagnosis Date    Kidney stone        Past Surgical History:   Procedure Laterality Date    HYSTERECTOMY         Review of patient's allergies indicates:   Allergen Reactions    Penicillins        No current facility-administered medications on file prior to encounter.      Current Outpatient Medications on File Prior to Encounter   Medication Sig    diphenhydrAMINE (BENADRYL) 25 mg capsule Take 1 each (25 mg total) by mouth every 6 (six) hours as needed for Itching or Allergies.    famotidine (PEPCID) 20 MG tablet Take 1 tablet (20 mg total) by mouth 2 (two)  times daily.     Family History     None        Tobacco Use    Smoking status: Current Some Day Smoker     Packs/day: 0.50     Types: Cigarettes   Substance and Sexual Activity    Alcohol use: No     Frequency: Never    Drug use: No    Sexual activity: Not on file     Review of Systems   Constitutional: Negative for activity change, appetite change, chills, diaphoresis, fatigue, fever and unexpected weight change.   HENT: Negative for congestion, ear pain, facial swelling, hearing loss, sore throat and trouble swallowing.    Eyes: Negative for pain and discharge.   Respiratory: Negative for cough, chest tightness, shortness of breath and wheezing.    Cardiovascular: Negative for chest pain, palpitations and leg swelling.   Gastrointestinal: Positive for abdominal pain and vomiting. Negative for blood in stool, diarrhea and nausea.   Endocrine: Negative for polydipsia, polyphagia and polyuria.   Genitourinary: Positive for flank pain, frequency and hematuria. Negative for difficulty urinating, dysuria and urgency.   Musculoskeletal: Negative for arthralgias, back pain, joint swelling, neck pain and neck stiffness.   Skin: Negative for rash and wound.   Allergic/Immunologic: Negative for environmental allergies and immunocompromised state.   Neurological: Negative for dizziness, seizures, syncope, speech difficulty, weakness, light-headedness, numbness and headaches.   Hematological: Negative for adenopathy.   Psychiatric/Behavioral: Negative for sleep disturbance and suicidal ideas. The patient is not nervous/anxious.    All other systems reviewed and are negative.    Objective:     Vital Signs (Most Recent):  Temp: 98 °F (36.7 °C) (11/23/20 1804)  Pulse: 98 (11/23/20 1804)  Resp: 18 (11/23/20 2043)  BP: 110/87 (11/23/20 1804)  SpO2: 99 % (11/23/20 1804) Vital Signs (24h Range):  Temp:  [98 °F (36.7 °C)] 98 °F (36.7 °C)  Pulse:  [98] 98  Resp:  [18-20] 18  SpO2:  [99 %] 99 %  BP: (110)/(87) 110/87     Weight: 93  kg (205 lb)  Body mass index is 30.27 kg/m².    Physical Exam  Vitals signs and nursing note reviewed.   Constitutional:       Appearance: Normal appearance.   HENT:      Head: Normocephalic and atraumatic.      Nose: Nose normal.      Mouth/Throat:      Mouth: Mucous membranes are moist.      Pharynx: Oropharynx is clear.   Eyes:      Extraocular Movements: Extraocular movements intact.      Pupils: Pupils are equal, round, and reactive to light.   Neck:      Musculoskeletal: Normal range of motion and neck supple.   Cardiovascular:      Rate and Rhythm: Normal rate and regular rhythm.      Pulses: Normal pulses.      Heart sounds: Normal heart sounds.   Pulmonary:      Effort: Pulmonary effort is normal.      Breath sounds: Rhonchi present.      Comments: Rhonchi in the rt upper lobe that clears with a cough  Abdominal:      General: Bowel sounds are normal.      Palpations: Abdomen is soft.      Tenderness: There is right CVA tenderness.   Musculoskeletal: Normal range of motion.   Skin:     General: Skin is warm and dry.      Capillary Refill: Capillary refill takes less than 2 seconds.   Neurological:      General: No focal deficit present.      Mental Status: She is alert and oriented to person, place, and time.   Psychiatric:         Mood and Affect: Mood normal.         Behavior: Behavior normal.           CRANIAL NERVES     CN III, IV, VI   Pupils are equal, round, and reactive to light.       Significant Labs:   CBC:   Recent Labs   Lab 11/23/20  1819   WBC 10.07   HGB 14.0   HCT 42.5        CMP:   Recent Labs   Lab 11/23/20  1819      K 3.8      CO2 25      BUN 16   CREATININE 0.7   CALCIUM 9.6   PROT 6.7   ALBUMIN 4.1   BILITOT 0.7   ALKPHOS 87   AST 15   ALT 21   ANIONGAP 9   EGFRNONAA >60.0     Urine Studies:   Recent Labs   Lab 11/23/20  1941   COLORU Yellow   APPEARANCEUA Clear   PHUR 7.0   SPECGRAV 1.010   PROTEINUA 1+*   GLUCUA Negative   KETONESU Negative   BILIRUBINUA  Negative   OCCULTUA 2+*   NITRITE Negative   UROBILINOGEN Negative   LEUKOCYTESUR 1+*   RBCUA 4   WBCUA 17*   BACTERIA Negative   SQUAMEPITHEL 2   HYALINECASTS 7*       Significant Imaging: I have reviewed all pertinent imaging results/findings within the past 24 hours.     Ct Renal Stone Study Abd Pelvis Wo    Result Date: 11/23/2020  CMS MANDATED QUALITY DATA-CT RADIATION DOSE-436 All CT scans at this facility use dose modulation, iterative reconstruction, and or weight-based dosing when appropriate to reduce radiation dose to as low as reasonably achievable. HISTORY: Right flank pain. FINDINGS: Noncontrast axial images were obtained. Nonenhanced study is tailored for the detection of urolithiasis, and is insensitive for abnormalities of the solid organs, vasculature and hollow viscera.  No prior studies for comparison. CT ABDOMEN: Mild subpleural reticular and groundglass densities at the lung bases suggest subsegmental atelectasis, with the lung bases otherwise clear. The unenhanced liver and gallbladder are unremarkable, with no calcified gallstones or biliary ductal dilatation. The unenhanced spleen, pancreas and adrenal glands are unremarkable. There is a 22 mm right renal calculus in the renal pelvis centrally and perhaps lodged at the ureteropelvic junction, with mild hydronephrosis. There is no additional 2 mm nonobstructing right lower pole renal calculus, with right perinephric and proximal right periureteral stranding reflecting edema. There are no left renal or ureteral calculi, with no left-sided hydroureteronephrosis. Hypodense left upper pole renal lesion is nonspecific but suggestive of a cyst. The abdominal aorta and iliac arteries are normal in caliber. There is no bowel obstruction, ascites, or intraperitoneal free air. There are scattered colon diverticula. The appendix is normal. CT PELVIS: There are no distal ureteral or bladder calculi, with a few calcified pelvic phleboliths. The unenhanced  rectum and urinary bladder are unremarkable, with the uterus surgically absent. There is no pelvic free fluid, with no pelvic or inguinal lymph node enlargement. The unenhanced extraperitoneal soft tissues are unremarkable. There is intervertebral disc space narrowing and facet arthropathy in the lumbar spine. IMPRESSION: 1. A 22 mm right renal calculus, possibly lodged at the right ureterovesicular junction given presence of right-sided hydronephrosis. Right perinephric and periureteral edema is nonspecific; please correlate with urinalysis and any clinical suspicion of urinary tract infection. 2. Additional 2 mm nonobstructing right lower pole renal calculus. 3. Diverticulosis coli. 4. Prior hysterectomy. Electronically Signed by Hernesto CLAROS on 11/23/2020 6:59 PM      Assessment/Plan:     Active Hospital Problems    Diagnosis    *Obstructive uropathy    Hydronephrosis with urinary obstruction due to ureteral calculus    Nicotine use disorder    Intractable pain     PLAN:  Admit to med/surg  Consult Dr. Hawkins - called per ER MD, plans to see in AM   Hold NPO after midnight  Started levaquin in ED given PCN allergy, will continue   IV hydration  flomax  PO pain medication with IV for breakthrough  Bedside counseling on smoking cessation   EKG/CXR for pre op    VTE Risk Mitigation (From admission, onward)    None             Galina Jaeger NP  Department of Hospital Medicine   [facility]

## 2020-11-24 NOTE — PLAN OF CARE
Chart reviewed. Discharge home with no needs.      11/24/20 0634   Final Note   Assessment Type Final Discharge Note   Anticipated Discharge Disposition Home

## 2020-11-24 NOTE — CARE UPDATE
Called by ED regarding patient who presented with right flank pain for the last few days, acutely worsened today.     CT scan shows a 2.2 cm right renal pelvis stone with proximal hydro. There is also some dilation of the proximal right ureter with no ureteral stones.     Patient afebrile with stable vitals. WBC 10.07, Cr 0.7. UA shows 4 RBC, 17 WBC, nitrite negative.     Patient will likely need eventual PCNL. However will probably need pain control prior. If unable to get pain controlled in ED, ok to admit to medicine for possible stent vs nephrostomy tube tomorrow. I will see patient in the morning.   NPO at midnight.     If her pain is able to be controlled with oral medications, ok to discharge home and I will see her in clinic on Wednesday, 11/25 to set up for procedure.       Yuko Hawkins MD  Urology Department

## 2020-11-24 NOTE — ANESTHESIA PROCEDURE NOTES
Intubation  Performed by: Bharat Bee CRNA  Authorized by: Cole Sanz MD     Intubation:     Induction:  Intravenous    Intubated:  Postinduction    Mask Ventilation:  N/a    Attempts:  1    Attempted By:  CRNA    Method of Intubation:  Direct    Blade:  Rodrigues 2    Laryngeal View Grade: Grade I - full view of chords      Difficult Airway Encountered?: No      Complications:  None    Airway Device:  Oral endotracheal tube    Airway Device Size:  7.5    Style/Cuff Inflation:  Cuffed    Tube secured:  22    Secured at:  The lips    Placement Verified By:  Capnometry    Complicating Factors:  None    Findings Post-Intubation:  BS equal bilateral and atraumatic/condition of teeth unchanged

## 2020-11-24 NOTE — ANESTHESIA POSTPROCEDURE EVALUATION
Anesthesia Post Evaluation    Patient: Tereza Weinstein    Procedure(s) Performed: Procedure(s) (LRB):  CYSTOSCOPY, WITH URETERAL STENT INSERTION (Right)    Final Anesthesia Type: general    Patient location during evaluation: PACU  Patient participation: Yes- Able to Participate  Level of consciousness: awake and alert  Post-procedure vital signs: reviewed and stable  Pain management: adequate  Airway patency: patent    PONV status at discharge: No PONV  Anesthetic complications: no      Cardiovascular status: stable  Respiratory status: unassisted and spontaneous ventilation  Hydration status: euvolemic  Follow-up not needed.          Vitals Value Taken Time   /68 11/24/20 1400   Temp 36.6 °C (97.8 °F) 11/24/20 1400   Pulse 62 11/24/20 1409   Resp 15 11/24/20 1409   SpO2 100 % 11/24/20 1409   Vitals shown include unvalidated device data.      No case tracking events are documented in the log.      Pain/Manasa Score: Pain Rating Prior to Med Admin: 3 (11/24/2020 11:08 AM)  Manasa Score: 9 (11/24/2020  2:00 PM)

## 2020-11-24 NOTE — ASSESSMENT & PLAN NOTE
- We discussed options for definitive stone management including PCNL vs staged URS, I recommended that PCNL would be the best way to get her stone free in 1 procedure, she is agreeable to this  - We discussed temporizing her with drainage of the right kidney with a stent vs neph tube  - If she was to undergo neph tube placement it may save us a step down the road in preparation for further PCNL, however she does not wish to have a drainage bag for the next week or longer   - Therefore we will plan for right ureteral stent placement this afternoon, I did advise that if we are unable to place stent from below a neph tube will have to be placed to drain the right kidney  - Will likely be ok for discharge following procedure and will see in clinic next week to set up for PCNL

## 2020-11-24 NOTE — OP NOTE
[facility]    Date: 11/24/2020    Pre-Op Diagnosis:   - Right 2.2 cm renal pelvis stone    Patient Active Problem List   Diagnosis    Obstructive uropathy    Hydronephrosis with urinary obstruction due to ureteral calculus    Nicotine use disorder    Intractable pain       Op Diagnosis: same    Procedure(s) Performed:    1. Cystoscopy with right ureteral JJ stent placement  2. Right retrograde pyelogram  3. Fluoroscopy < 1 h    Specimen(s): none    Staff Surgeon: Yuko Hawkins MD    Anesthesia: Monitored Local Anesthesia with Sedation    Indications: Tereza Weinstein is a 50 y.o. female with right 2.2 UPJ stone. She has had uncontrolled pain and vomiting.    Findings:    - no bladder abnormalities   - right RGP performed to outline pelvis  - stone radiopaque     Estimated Blood Loss: min    Drains:  6 Divehi x 26 cm right JJ ureteral stent without strings    Procedure in Detail:  After risks, benefits and possible complications of the procedure were explained, the patient elected to undergo the procedure and informed consent was obtained. All questions were answered in the hang-operative area. The patient was transferred to the cystoscopy suite and placed on the fluoroscopy table in the supine position.  SCDs were applied and working. Time out was performed, hang-procedural antibiotics were given. Anesthesia was administered.  After adequate anesthesia the patient was placed in dorsal lithotomy position and prepped and draped in the usual sterile fashion.     A rigid cystoscope in a 22 Fr sheath was introduced into the patients bladder per urethra. This passed easily.  The entire urethra was visualized and revealed no strictures or masses.  Cystoscopy was performed which showed the right and left ureteral orifices in the normal anatomic position.  There were no bladder tumors, no  trabeculations, and no stones.      Our attention was turned to the patient's right ureteral orifice.  A motion  wire was advanced up the right ureteral orifice to the level of the expected renal pelvis.  This was confirmed using fluoroscopy. The wire was replaced with a 5 fr open ended catheter and the wire removed. A RGP was performed to outline the pelvis. No filling defects were seen. The wire was then replaced.     We then passed a 6 Fr x 26 cm JJ ureteral stent without strings over the wire to the level of the renal pelvis under direct vision as well as flouroscopy. The guide wire was removed.  A 180 degree coil was observed in the renal pelvis as well as the bladder using fluoroscopy.  A 180 degree coil was also seen using direct visualization in the bladder.     The patient tolerated the procedure well and was transferred to the recovery room in stable condition.      Disposition: The patient will follow up with me next week for definitive stone management.      Yuko Hawkins MD

## 2020-11-24 NOTE — CONSULTS
[facility]  Urology  Consult Note    Patient Name: Tereza Weinstein  MRN: [id number]  Admission Date: 11/23/2020  Hospital Length of Stay: 1   Code Status: Full Code   Attending Provider: Mikal Olivo MD   Consulting Provider: Yuko Hawkins MD  Primary Care Physician: Primary Doctor No  Principal Problem:Obstructive uropathy    Inpatient consult to Urology  Consult performed by: Yuko Hawkins MD  Consult ordered by: Galina Jaeger NP          Subjective:     HPI:  Tereza Weinstein is a 50 y.o. female who presented to the ED last night with complaints of right flank pain that has been present for a few days however acutely worsened.     Also having nausea and vomiting. No fevers. Some hematuria.     She has had multiple stone episodes in the past and has undergone ureteroscopy with stent placement.     CT revealed a 2.2 cm right renal pelvis stone with proximal hydro however also some dilation of the ureter with no ureteral stones present. WBC 8 and Cr 0.6 this am. Vitals stable overnight.     Past Medical History:   Diagnosis Date    Kidney stone        Past Surgical History:   Procedure Laterality Date    HYSTERECTOMY         Review of patient's allergies indicates:   Allergen Reactions    Penicillins        Family History     None          Tobacco Use    Smoking status: Current Some Day Smoker     Packs/day: 0.50     Types: Cigarettes   Substance and Sexual Activity    Alcohol use: No     Frequency: Never    Drug use: No    Sexual activity: Not on file       Review of Systems   Constitutional: Positive for chills. Negative for fever.   HENT: Negative for trouble swallowing.    Respiratory: Negative for cough and shortness of breath.    Cardiovascular: Negative for chest pain and palpitations.   Gastrointestinal: Positive for nausea and vomiting. Negative for abdominal pain.   Genitourinary: Positive for flank pain and hematuria. Negative for difficulty urinating, dysuria,  frequency and urgency.   Musculoskeletal: Positive for back pain.   Skin: Negative for rash.   Neurological: Negative for weakness.   Psychiatric/Behavioral: Negative for behavioral problems.       Objective:     Temp:  [98 °F (36.7 °C)-98.9 °F (37.2 °C)] 98.9 °F (37.2 °C)  Pulse:  [73-98] 73  Resp:  [17-20] 18  SpO2:  [95 %-99 %] 98 %  BP: ()/(57-87) 110/75     Body mass index is 30.77 kg/m².           Drains     None                 Physical Exam  Vitals signs reviewed.   Constitutional:       General: She is not in acute distress.     Appearance: Normal appearance. She is not ill-appearing.   HENT:      Head: Normocephalic and atraumatic.   Eyes:      General: No scleral icterus.  Cardiovascular:      Rate and Rhythm: Normal rate and regular rhythm.   Pulmonary:      Effort: Pulmonary effort is normal. No respiratory distress.   Abdominal:      General: There is no distension.      Palpations: Abdomen is soft.      Tenderness: There is no abdominal tenderness.      Comments: Mild right CVA tenderness   Skin:     General: Skin is warm and dry.      Coloration: Skin is not jaundiced.   Neurological:      General: No focal deficit present.      Mental Status: She is alert and oriented to person, place, and time.   Psychiatric:         Mood and Affect: Mood normal.         Behavior: Behavior normal.         Significant Labs:    BMP:  Recent Labs   Lab 11/23/20 1819 11/24/20  0502    139   K 3.8 4.1    108   CO2 25 24   BUN 16 12   CREATININE 0.7 0.6   CALCIUM 9.6 8.9       CBC:  Recent Labs   Lab 11/23/20 1819 11/24/20  0502   WBC 10.07 8.21   HGB 14.0 12.6   HCT 42.5 38.1    215       Urine Culture:   Recent Labs   Lab 11/23/20 1941   LABURIN No growth to date     Urine Studies:   Recent Labs   Lab 11/23/20 1941   COLORU Yellow   APPEARANCEUA Clear   PHUR 7.0   SPECGRAV 1.010   PROTEINUA 1+*   GLUCUA Negative   KETONESU Negative   BILIRUBINUA Negative   OCCULTUA 2+*   NITRITE Negative    UROBILINOGEN Negative   LEUKOCYTESUR 1+*   RBCUA 4   WBCUA 17*   BACTERIA Negative   SQUAMEPITHEL 2   HYALINECASTS 7*       Significant Imaging:  All pertinent imaging results/findings from the past 24 hours have been reviewed.  Per HPI          Assessment and Plan:     Hydronephrosis with urinary obstruction due to ureteral calculus  - We discussed options for definitive stone management including PCNL vs staged URS, I recommended that PCNL would be the best way to get her stone free in 1 procedure, she is agreeable to this  - We discussed temporizing her with drainage of the right kidney with a stent vs neph tube  - If she was to undergo neph tube placement it may save us a step down the road in preparation for further PCNL, however she does not wish to have a drainage bag for the next week or longer   - Therefore we will plan for right ureteral stent placement this afternoon, I did advise that if we are unable to place stent from below a neph tube will have to be placed to drain the right kidney  - Will likely be ok for discharge following procedure and will see in clinic next week to set up for PCNL        VTE Risk Mitigation (From admission, onward)         Ordered     IP VTE HIGH RISK PATIENT  Once      11/23/20 2222     Place sequential compression device  Until discontinued      11/23/20 2222                Thank you for your consult. I will follow-up with patient. Please contact us if you have any additional questions.    Yuko Hawkins MD  Urology  [facility]

## 2020-11-24 NOTE — ANESTHESIA PREPROCEDURE EVALUATION
11/24/2020  Tereza Weinstein is a 50 y.o., female.    Anesthesia Evaluation    I have reviewed the Patient Summary Reports.     I have reviewed the Nursing Notes. I have reviewed the NPO Status.   I have reviewed the Medications.     Review of Systems  Anesthesia Hx:  Denies Family Hx of Anesthesia complications.   Denies Personal Hx of Anesthesia complications.   Social:  No Alcohol Use, Smoker   Hematology/Oncology:  Hematology Normal   Oncology Normal     EENT/Dental:EENT/Dental Normal   Cardiovascular:  Cardiovascular Normal     Pulmonary:  Pulmonary Normal    Renal/:   renal calculi    Hepatic/GI:   N/V and flank pain   Musculoskeletal:  Musculoskeletal Normal    Neurological:  Neurology Normal    Endocrine:  Endocrine Normal    Psych:  Psychiatric Normal           Physical Exam  General:  Obesity    Airway/Jaw/Neck:  Airway Findings: Mouth Opening: Normal Tongue: Normal  Mallampati: II  Improves to I with phonation.  TM Distance: Normal, at least 6 cm  Jaw/Neck Findings:  Neck ROM: Normal ROM      Dental:  Dental Findings: In tact   Chest/Lungs:  Chest/Lungs Findings: Clear to auscultation, Normal Respiratory Rate     Heart/Vascular:  Heart Findings: Rate: Normal  Rhythm: Regular Rhythm  Sounds: Normal  Heart murmur: negative       Mental Status:  Mental Status Findings:  Cooperative, Alert and Oriented         Anesthesia Plan  Type of Anesthesia, risks & benefits discussed:  Anesthesia Type:  general  Patient's Preference:   Intra-op Monitoring Plan: standard ASA monitors  Intra-op Monitoring Plan Comments:   Post Op Pain Control Plan: multimodal analgesia and IV/PO Opioids PRN  Post Op Pain Control Plan Comments:   Induction:   IV  Beta Blocker:  Patient is not currently on a Beta-Blocker (No further documentation required).       Informed Consent: Patient understands risks and agrees with  Anesthesia plan.  Questions answered. Anesthesia consent signed with patient.  ASA Score: 2     Day of Surgery Review of History & Physical:        Anesthesia Plan Notes: GETA / RSI    Multimodal analgesia: Decadron 8mg, Pt took Norco this am.  Antiemetics: Zofran, Pt took Pepcid this am          Ready For Surgery From Anesthesia Perspective.

## 2020-11-24 NOTE — FIRST PROVIDER EVALUATION
"Medical screening exam completed.  I have conducted a focused provider triage encounter, findings are as follows:    Brief history of present illness:  Right flank pain Onset last PM  Denies urinary symptoms     Vitals:    11/23/20 1804   BP: 110/87   BP Location: Left arm   Patient Position: Sitting   Pulse: 98   Resp: 20   Temp: 98 °F (36.7 °C)   TempSrc: Oral   SpO2: 99%   Weight: 93 kg (205 lb)   Height: 5' 9" (1.753 m)       Pertinent physical exam:  CVA tenderness   Brief workup plan:  Kidney stone work up     Preliminary workup initiated; this workup will be continued and followed by the physician or advanced practice provider that is assigned to the patient when roomed.  "

## 2020-11-24 NOTE — TRANSFER OF CARE
"Anesthesia Transfer of Care Note    Patient: Tereza Weinstein    Procedure(s) Performed: Procedure(s) (LRB):  CYSTOSCOPY, WITH URETERAL STENT INSERTION (Right)    Patient location: PACU    Anesthesia Type: general    Transport from OR: Transported from OR on room air with adequate spontaneous ventilation    Post pain: adequate analgesia    Post assessment: no apparent anesthetic complications    Post vital signs: stable    Level of consciousness: awake and alert    Nausea/Vomiting: no nausea/vomiting    Complications: none    Transfer of care protocol was followed      Last vitals:   Visit Vitals  /62   Pulse 67   Temp 37.1 °C (98.7 °F) (Oral)   Resp 18   Ht 5' 9" (1.753 m)   Wt 94.5 kg (208 lb 5.4 oz)   SpO2 97%   Breastfeeding No   BMI 30.77 kg/m²     "

## 2020-11-24 NOTE — PLAN OF CARE
11/24/20 1631   Discharge Assessment   Assessment Type Discharge Planning Assessment   Assessment information obtained from? Medical Record   Prior to hospitilization cognitive status: Alert/Oriented   Prior to hospitalization functional status: Independent   Current cognitive status: Alert/Oriented   Current Functional Status: Independent   Discharge Plan A Home   Discharge Plan B Home   DME Needed Upon Discharge  none

## 2020-11-24 NOTE — TELEPHONE ENCOUNTER
----- Message from Danielle Boston LPN sent at 11/24/2020  1:50 PM CST -----  I attempted to schedule and was not seeing availability on [facility] schedule so that's why I am forwarding.   ----- Message -----  From: Yuko Hawkins MD  Sent: 11/24/2020   1:24 PM CST  To: [facility] Staff    Please schedule patient with me on 11/30 to discuss PCNL

## 2020-11-24 NOTE — SUBJECTIVE & OBJECTIVE
Past Medical History:   Diagnosis Date    Kidney stone        Past Surgical History:   Procedure Laterality Date    HYSTERECTOMY         Review of patient's allergies indicates:   Allergen Reactions    Penicillins        No current facility-administered medications on file prior to encounter.      Current Outpatient Medications on File Prior to Encounter   Medication Sig    diphenhydrAMINE (BENADRYL) 25 mg capsule Take 1 each (25 mg total) by mouth every 6 (six) hours as needed for Itching or Allergies.    famotidine (PEPCID) 20 MG tablet Take 1 tablet (20 mg total) by mouth 2 (two) times daily.     Family History     None        Tobacco Use    Smoking status: Current Some Day Smoker     Packs/day: 0.50     Types: Cigarettes   Substance and Sexual Activity    Alcohol use: No     Frequency: Never    Drug use: No    Sexual activity: Not on file     Review of Systems   Constitutional: Negative for activity change, appetite change, chills, diaphoresis, fatigue, fever and unexpected weight change.   HENT: Negative for congestion, ear pain, facial swelling, hearing loss, sore throat and trouble swallowing.    Eyes: Negative for pain and discharge.   Respiratory: Negative for cough, chest tightness, shortness of breath and wheezing.    Cardiovascular: Negative for chest pain, palpitations and leg swelling.   Gastrointestinal: Positive for abdominal pain and vomiting. Negative for blood in stool, diarrhea and nausea.   Endocrine: Negative for polydipsia, polyphagia and polyuria.   Genitourinary: Positive for flank pain, frequency and hematuria. Negative for difficulty urinating, dysuria and urgency.   Musculoskeletal: Negative for arthralgias, back pain, joint swelling, neck pain and neck stiffness.   Skin: Negative for rash and wound.   Allergic/Immunologic: Negative for environmental allergies and immunocompromised state.   Neurological: Negative for dizziness, seizures, syncope, speech difficulty, weakness,  light-headedness, numbness and headaches.   Hematological: Negative for adenopathy.   Psychiatric/Behavioral: Negative for sleep disturbance and suicidal ideas. The patient is not nervous/anxious.    All other systems reviewed and are negative.    Objective:     Vital Signs (Most Recent):  Temp: 98 °F (36.7 °C) (11/23/20 1804)  Pulse: 98 (11/23/20 1804)  Resp: 18 (11/23/20 2043)  BP: 110/87 (11/23/20 1804)  SpO2: 99 % (11/23/20 1804) Vital Signs (24h Range):  Temp:  [98 °F (36.7 °C)] 98 °F (36.7 °C)  Pulse:  [98] 98  Resp:  [18-20] 18  SpO2:  [99 %] 99 %  BP: (110)/(87) 110/87     Weight: 93 kg (205 lb)  Body mass index is 30.27 kg/m².    Physical Exam  Vitals signs and nursing note reviewed.   Constitutional:       Appearance: Normal appearance.   HENT:      Head: Normocephalic and atraumatic.      Nose: Nose normal.      Mouth/Throat:      Mouth: Mucous membranes are moist.      Pharynx: Oropharynx is clear.   Eyes:      Extraocular Movements: Extraocular movements intact.      Pupils: Pupils are equal, round, and reactive to light.   Neck:      Musculoskeletal: Normal range of motion and neck supple.   Cardiovascular:      Rate and Rhythm: Normal rate and regular rhythm.      Pulses: Normal pulses.      Heart sounds: Normal heart sounds.   Pulmonary:      Effort: Pulmonary effort is normal.      Breath sounds: Rhonchi present.      Comments: Rhonchi in the rt upper lobe that clears with a cough  Abdominal:      General: Bowel sounds are normal.      Palpations: Abdomen is soft.      Tenderness: There is right CVA tenderness.   Musculoskeletal: Normal range of motion.   Skin:     General: Skin is warm and dry.      Capillary Refill: Capillary refill takes less than 2 seconds.   Neurological:      General: No focal deficit present.      Mental Status: She is alert and oriented to person, place, and time.   Psychiatric:         Mood and Affect: Mood normal.         Behavior: Behavior normal.           CRANIAL NERVES      CN III, IV, VI   Pupils are equal, round, and reactive to light.       Significant Labs:   CBC:   Recent Labs   Lab 11/23/20  1819   WBC 10.07   HGB 14.0   HCT 42.5        CMP:   Recent Labs   Lab 11/23/20  1819      K 3.8      CO2 25      BUN 16   CREATININE 0.7   CALCIUM 9.6   PROT 6.7   ALBUMIN 4.1   BILITOT 0.7   ALKPHOS 87   AST 15   ALT 21   ANIONGAP 9   EGFRNONAA >60.0     Urine Studies:   Recent Labs   Lab 11/23/20  1941   COLORU Yellow   APPEARANCEUA Clear   PHUR 7.0   SPECGRAV 1.010   PROTEINUA 1+*   GLUCUA Negative   KETONESU Negative   BILIRUBINUA Negative   OCCULTUA 2+*   NITRITE Negative   UROBILINOGEN Negative   LEUKOCYTESUR 1+*   RBCUA 4   WBCUA 17*   BACTERIA Negative   SQUAMEPITHEL 2   HYALINECASTS 7*       Significant Imaging: I have reviewed all pertinent imaging results/findings within the past 24 hours.     Ct Renal Stone Study Abd Pelvis Wo    Result Date: 11/23/2020  CMS MANDATED QUALITY DATA-CT RADIATION DOSE-[id number] All CT scans at this facility use dose modulation, iterative reconstruction, and or weight-based dosing when appropriate to reduce radiation dose to as low as reasonably achievable. HISTORY: Right flank pain. FINDINGS: Noncontrast axial images were obtained. Nonenhanced study is tailored for the detection of urolithiasis, and is insensitive for abnormalities of the solid organs, vasculature and hollow viscera.  No prior studies for comparison. CT ABDOMEN: Mild subpleural reticular and groundglass densities at the lung bases suggest subsegmental atelectasis, with the lung bases otherwise clear. The unenhanced liver and gallbladder are unremarkable, with no calcified gallstones or biliary ductal dilatation. The unenhanced spleen, pancreas and adrenal glands are unremarkable. There is a 22 mm right renal calculus in the renal pelvis centrally and perhaps lodged at the ureteropelvic junction, with mild hydronephrosis. There is no additional 2 mm  nonobstructing right lower pole renal calculus, with right perinephric and proximal right periureteral stranding reflecting edema. There are no left renal or ureteral calculi, with no left-sided hydroureteronephrosis. Hypodense left upper pole renal lesion is nonspecific but suggestive of a cyst. The abdominal aorta and iliac arteries are normal in caliber. There is no bowel obstruction, ascites, or intraperitoneal free air. There are scattered colon diverticula. The appendix is normal. CT PELVIS: There are no distal ureteral or bladder calculi, with a few calcified pelvic phleboliths. The unenhanced rectum and urinary bladder are unremarkable, with the uterus surgically absent. There is no pelvic free fluid, with no pelvic or inguinal lymph node enlargement. The unenhanced extraperitoneal soft tissues are unremarkable. There is intervertebral disc space narrowing and facet arthropathy in the lumbar spine. IMPRESSION: 1. A 22 mm right renal calculus, possibly lodged at the right ureterovesicular junction given presence of right-sided hydronephrosis. Right perinephric and periureteral edema is nonspecific; please correlate with urinalysis and any clinical suspicion of urinary tract infection. 2. Additional 2 mm nonobstructing right lower pole renal calculus. 3. Diverticulosis coli. 4. Prior hysterectomy. Electronically Signed by Hernesto CLAROS on 11/23/2020 6:59 PM

## 2020-11-24 NOTE — ED PROVIDER NOTES
Encounter Date: 11/23/2020       History     Chief Complaint   Patient presents with    Flank Pain     right flank, blood in urine, hx of kidney stone    Nausea     50-year-old female presents complaining of right flank pain patient has a history of kidney stones patient reports pain started approximately 3-4 days ago she reports 10/10 pain nausea and vomiting patient localizes pain to the right flank she reports some difficulty urinating she reports seeing blood in urine.        Review of patient's allergies indicates:   Allergen Reactions    Penicillins      Past Medical History:   Diagnosis Date    Kidney stone      Past Surgical History:   Procedure Laterality Date    HYSTERECTOMY       History reviewed. No pertinent family history.  Social History     Tobacco Use    Smoking status: Current Some Day Smoker     Packs/day: 0.50     Types: Cigarettes   Substance Use Topics    Alcohol use: No     Frequency: Never    Drug use: No     Review of Systems   Constitutional: Negative for fever.   HENT: Negative for congestion, rhinorrhea, sore throat and trouble swallowing.    Eyes: Negative for visual disturbance.   Respiratory: Negative for cough, chest tightness, shortness of breath and wheezing.    Cardiovascular: Negative for chest pain, palpitations and leg swelling.   Gastrointestinal: Negative for abdominal distention, abdominal pain, constipation, diarrhea, nausea and vomiting.   Genitourinary: Positive for flank pain and hematuria. Negative for difficulty urinating, dysuria and frequency.   Musculoskeletal: Negative for arthralgias, back pain, joint swelling and neck pain.   Skin: Negative for color change and rash.   Neurological: Negative for dizziness, syncope, speech difficulty, weakness, numbness and headaches.   All other systems reviewed and are negative.      Physical Exam     Initial Vitals [11/23/20 1804]   BP Pulse Resp Temp SpO2   110/87 98 20 98 °F (36.7 °C) 99 %      MAP       --          Physical Exam    Nursing note and vitals reviewed.  Constitutional: She appears well-developed and well-nourished. She is not diaphoretic. No distress.   HENT:   Head: Normocephalic and atraumatic.   Right Ear: External ear normal.   Left Ear: External ear normal.   Nose: Nose normal.   Mouth/Throat: Oropharynx is clear and moist. No oropharyngeal exudate.   Eyes: Conjunctivae and EOM are normal. Pupils are equal, round, and reactive to light. Right eye exhibits no discharge. Left eye exhibits no discharge. No scleral icterus.   Neck: Normal range of motion. Neck supple. No thyromegaly present. No tracheal deviation present. No JVD present.   Cardiovascular: Normal rate, regular rhythm, normal heart sounds and intact distal pulses. Exam reveals no gallop and no friction rub.    No murmur heard.  Pulmonary/Chest: Breath sounds normal. No stridor. No respiratory distress. She has no wheezes. She has no rhonchi. She has no rales. She exhibits no tenderness.   Abdominal: Soft. Bowel sounds are normal. She exhibits no distension and no mass. There is no abdominal tenderness. There is no rebound and no guarding.   Genitourinary:    Genitourinary Comments: Negative for CVA tenderness bilaterally     Musculoskeletal: Normal range of motion. No tenderness or edema.   Lymphadenopathy:     She has no cervical adenopathy.   Neurological: She is alert and oriented to person, place, and time. She has normal strength. She displays normal reflexes. No cranial nerve deficit or sensory deficit.   Skin: Skin is warm and dry. No rash and no abscess noted. No erythema. No pallor.         ED Course   Procedures  Labs Reviewed   CBC W/ AUTO DIFFERENTIAL - Abnormal; Notable for the following components:       Result Value    MCV 99 (*)     MCH 32.6 (*)     All other components within normal limits   URINALYSIS, REFLEX TO URINE CULTURE - Abnormal; Notable for the following components:    Protein, UA 1+ (*)     Occult Blood UA 2+ (*)      Leukocytes, UA 1+ (*)     All other components within normal limits    Narrative:     Specimen Source->Urine   URINALYSIS MICROSCOPIC - Abnormal; Notable for the following components:    WBC, UA 17 (*)     Hyaline Casts, UA 7 (*)     All other components within normal limits    Narrative:     Specimen Source->Urine   CULTURE, URINE   COMPREHENSIVE METABOLIC PANEL   SARS-COV-2 RNA AMPLIFICATION, QUAL          Imaging Results          X-Ray Chest AP Portable (In process)    Procedure changed from X-Ray Chest 1 View                CT Renal Stone Study ABD Pelvis WO (Final result)  Result time 11/23/20 18:35:16    Final result by Hernesto Motley MD (11/23/20 18:35:16)                 Narrative:    CMS MANDATED QUALITY DATA-CT RADIATION DOSE-436    All CT scans at this facility use dose modulation, iterative  reconstruction, and or weight-based dosing when appropriate to reduce  radiation dose to as low as reasonably achievable.    HISTORY: Right flank pain.    FINDINGS: Noncontrast axial images were obtained. Nonenhanced study is  tailored for the detection of urolithiasis, and is insensitive for  abnormalities of the solid organs, vasculature and hollow viscera.  No  prior studies for comparison.    CT ABDOMEN: Mild subpleural reticular and groundglass densities at the  lung bases suggest subsegmental atelectasis, with the lung bases  otherwise clear. The unenhanced liver and gallbladder are  unremarkable, with no calcified gallstones or biliary ductal  dilatation. The unenhanced spleen, pancreas and adrenal glands are  unremarkable.    There is a 22 mm right renal calculus in the renal pelvis centrally  and perhaps lodged at the ureteropelvic junction, with mild  hydronephrosis. There is no additional 2 mm nonobstructing right lower  pole renal calculus, with right perinephric and proximal right  periureteral stranding reflecting edema. There are no left renal or  ureteral calculi, with no left-sided  hydroureteronephrosis. Hypodense  left upper pole renal lesion is nonspecific but suggestive of a cyst.    The abdominal aorta and iliac arteries are normal in caliber. There is  no bowel obstruction, ascites, or intraperitoneal free air. There are  scattered colon diverticula. The appendix is normal.    CT PELVIS: There are no distal ureteral or bladder calculi, with a few  calcified pelvic phleboliths. The unenhanced rectum and urinary  bladder are unremarkable, with the uterus surgically absent. There is  no pelvic free fluid, with no pelvic or inguinal lymph node  enlargement.    The unenhanced extraperitoneal soft tissues are unremarkable. There is  intervertebral disc space narrowing and facet arthropathy in the  lumbar spine.    IMPRESSION:  1. A 22 mm right renal calculus, possibly lodged at the right  ureterovesicular junction given presence of right-sided  hydronephrosis. Right perinephric and periureteral edema is  nonspecific; please correlate with urinalysis and any clinical  suspicion of urinary tract infection.  2. Additional 2 mm nonobstructing right lower pole renal calculus.  3. Diverticulosis coli.  4. Prior hysterectomy.    Electronically Signed by Hernesto CLAROS on 11/23/2020 6:59 PM                               Medical Decision Making:   History:   Old Medical Records: I decided to obtain old medical records.  Initial Assessment:   Emergent evaluation of a 50-year-old female presenting with right flank pain patient has a very large 22 mm right renal calculus which appears to be lodged in the right UVJ, patient has hydronephrosis, patient also has perinephric fat stranding                             Clinical Impression:       ICD-10-CM ICD-9-CM   1. Kidney stone  N20.0 592.0   2. Obstructive uropathy  N13.9 599.60   3. Pre-op exam  Z01.818 V72.84                          ED Disposition Condition    Admit                             Efrain Cooper MD  11/23/20 2204

## 2020-11-25 NOTE — DISCHARGE SUMMARY
[facility] Hospital Medicine Discharge Summary    Date of Admit: 11/23/2020  Date of Discharge: 11/24/2020    Discharge to: Home or Self Care  Condition: good    Discharge Diagnoses     Problem Noted   Obstructive Uropathy s/p JJ stent placement  11/23/2020   Hydronephrosis With Urinary Obstruction Due to Ureteral Calculus 11/23/2020   Nicotine Use Disorder 11/23/2020   Intractable Pain (Resolved) 11/23/2020        Patient Active Problem List   Diagnosis    Obstructive uropathy    Hydronephrosis with urinary obstruction due to ureteral calculus    Nicotine use disorder        Brief History of Present Illness      Tereza Weinstein is a 50 y.o. female who  has a past medical history of Kidney stone.  The patient presented to [facility] on 11/23/2020 with a primary complaint of Flank Pain (right flank, blood in urine, hx of kidney stone) and Nausea  .       For the full HPI please refer to the History & Physical from this admission.    Hospital Course     Admitted with flank pain and R obstructing nephrolithiasis. Underwent JJ stent placement with urology with plans for close f/u in clinic to plan definitive management. Currently voiding well and pain is controlled; she is eager to get home. Advised her to keep urology f/u as scheduled and o return to the ED with any new, worsening, or recurrent symptoms.     Physical exam     Physical Exam  Constitutional:       General: She is not in acute distress.  HENT:      Head: Normocephalic and atraumatic.      Mouth/Throat:      Mouth: Mucous membranes are moist.      Pharynx: Oropharynx is clear.   Eyes:      General: No scleral icterus.     Pupils: Pupils are equal, round, and reactive to light.   Cardiovascular:      Rate and Rhythm: Normal rate and regular rhythm.   Pulmonary:      Effort: Pulmonary effort is normal. No respiratory distress.   Abdominal:      General: There is no distension.      Tenderness: There is no abdominal tenderness.   Musculoskeletal:         General: No  swelling.   Skin:     General: Skin is warm and dry.      Capillary Refill: Capillary refill takes less than 2 seconds.      Coloration: Skin is not jaundiced.   Neurological:      General: No focal deficit present.      Mental Status: She is alert and oriented to person, place, and time.   Psychiatric:         Mood and Affect: Mood normal.         Behavior: Behavior normal.           Discharge Medications        Medication List      START taking these medications    levoFLOXacin 750 MG tablet  Commonly known as: LEVAQUIN  Take 1 tablet (750 mg total) by mouth once daily.     tamsulosin 0.4 mg Cap  Commonly known as: FLOMAX  Take 1 capsule (0.4 mg total) by mouth once daily.  Start taking on: November 25, 2020        CONTINUE taking these medications    diphenhydrAMINE 25 mg capsule  Commonly known as: BENADRYL  Take 1 each (25 mg total) by mouth every 6 (six) hours as needed for Itching or Allergies.     famotidine 20 MG tablet  Commonly known as: PEPCID  Take 1 tablet (20 mg total) by mouth 2 (two) times daily.           Where to Get Your Medications      These medications were sent to [id number] - [place], [place] - [email or web address] [facility] & [facility] [address]  [address], [place] [address]    Phone: [phone]   · levoFLOXacin 750 MG tablet  · tamsulosin 0.4 mg Cap         Instructions:  1. Take all medications as prescribed  2. Keep all follow-up appointments  3. Return to the hospital or call your primary care physicians for any new, worsening, or recurrent symptoms.    Follow-Up:  Follow-up Information     Yuko Hawkins MD On 11/30/2020.    Specialty: Urology  Why: stone management  Contact information:  [address] [place] [address]  [address]  [place] [address]  [phone]                  time spent on discharge totalled 33 minutes       Juve Bajwa MD  6:45 PM  11/24/2020

## 2020-11-26 LAB — BACTERIA UR CULT: NO GROWTH

## 2020-11-29 NOTE — H&P (VIEW-ONLY)
[facility] Clinic Note    PCP: Primary Doctor No    Chief Complaint: right renal stone    SUBJECTIVE:       History of Present Illness:  Tereza Weinstein is a 50 y.o. female who presents to clinic for right renal stone. She is Established  to our clinic.     Patient presented to the ED on 11/23 with right flank pain and was discovered to have a 2 cm right renal pelvis stone with obstruction of the UPJ. She underwent stent placement the next day and was discharged home.    Patient has been having significant discomfort from the stent however otherwise doing well. No fevers. Complaining of flank and bladder discomfort.     Working on insurance, states she will have insurance starting at midnight tonight.     Last urine culture: no growth (11/23/20)    Lab Results   Component Value Date    CREATININE 0.6 11/24/2020     Current smoker.    Past medical, family, and social history reviewed as documented in chart with pertinent positive medical, family, and social history detailed in HPI.    Review of patient's allergies indicates:   Allergen Reactions    Penicillins        Past Medical History:   Diagnosis Date    Kidney stone      Past Surgical History:   Procedure Laterality Date    CYSTOSCOPY W/ URETERAL STENT PLACEMENT Right 11/24/2020    Procedure: CYSTOSCOPY, WITH URETERAL STENT INSERTION;  Surgeon: Yuko Hawkins MD;  Location: [facility];  Service: Urology;  Laterality: Right;    HYSTERECTOMY       History reviewed. No pertinent family history.  Social History     Tobacco Use    Smoking status: Current Some Day Smoker     Packs/day: 0.50     Types: Cigarettes   Substance Use Topics    Alcohol use: No     Frequency: Never    Drug use: No        Review of Systems   Constitutional: Negative for chills and fever.   HENT: Negative for trouble swallowing.    Eyes: Negative for pain.   Respiratory: Negative for cough and shortness of breath.    Cardiovascular: Negative for chest pain and  "palpitations.   Gastrointestinal: Negative for abdominal pain, nausea and vomiting.   Genitourinary: Positive for dysuria and flank pain. Negative for difficulty urinating, hematuria and urgency.   Skin: Negative for rash.   Neurological: Negative for weakness.   Psychiatric/Behavioral: Negative for behavioral problems.       OBJECTIVE:     Anticoagulation: no    Estimated body mass index is 30.6 kg/m² as calculated from the following:    Height as of this encounter: 5' 9" (1.753 m).    Weight as of this encounter: 94 kg (207 lb 3.7 oz).    Vital Signs (Most Recent)  Temp: 98 °F (36.7 °C) (11/30/20 1319)  Pulse: 82 (11/30/20 1319)  BP: 106/72 (11/30/20 1319)    Physical Exam  Vitals signs reviewed.   Constitutional:       General: She is not in acute distress.     Appearance: Normal appearance. She is not ill-appearing.   HENT:      Head: Normocephalic and atraumatic.   Eyes:      General: No scleral icterus.  Cardiovascular:      Rate and Rhythm: Normal rate and regular rhythm.   Pulmonary:      Effort: Pulmonary effort is normal. No respiratory distress.   Abdominal:      General: There is no distension.      Palpations: Abdomen is soft.      Tenderness: There is no abdominal tenderness.   Skin:     General: Skin is warm and dry.      Coloration: Skin is not jaundiced.   Neurological:      General: No focal deficit present.      Mental Status: She is alert and oriented to person, place, and time.   Psychiatric:         Mood and Affect: Mood normal.         Behavior: Behavior normal.         BMP  Lab Results   Component Value Date     11/24/2020    K 4.1 11/24/2020     11/24/2020    CO2 24 11/24/2020    BUN 12 11/24/2020    CREATININE 0.6 11/24/2020    CALCIUM 8.9 11/24/2020    ANIONGAP 7 (L) 11/24/2020    ESTGFRAFRICA >60.0 11/24/2020    EGFRNONAA >60.0 11/24/2020       Lab Results   Component Value Date    WBC 8.21 11/24/2020    HGB 12.6 11/24/2020    HCT 38.1 11/24/2020     (H) 11/24/2020    "  11/24/2020       Imaging:  CTRSS 11/23/20:  There is a 22 mm right renal calculus in the renal pelvis centrally  and perhaps lodged at the ureteropelvic junction, with mild  hydronephrosis. There is no additional 2 mm nonobstructing right lower  pole renal calculus, with right perinephric and proximal right  periureteral stranding reflecting edema. There are no left renal or  ureteral calculi, with no left-sided hydroureteronephrosis. Hypodense  left upper pole renal lesion is nonspecific but suggestive of a cyst.    ASSESSMENT     1. Obstructive uropathy    2. Right renal stone        PLAN:     - Plan for right PCNL on 12/11. COVID prior.   - Urine sent for culture  - PT/INR ordered  - Message sent to radiology to either have nephrostomy tube placed on Thursday 12/10 or morning of procedure   - I have explained the indication, risks, benefits, and alternatives of the procedure in detail.  The patient voices understanding and all questions have been answered. Risks including but not limited to bleeding, infection, injury to the lung, liver, spleen, colon, need for additional procedures, incomplete removal of stone, arteriovenous malformation, pseudoaneurysm, hydrothorax or pneumothorax, urinoma, ureteral injury or perforation, DVT, PE, heart attack, stroke, and death were discussed with the patient in depth.  The patient agrees to proceed as planned with right PCNL.      Yuko Hawkins MD

## 2020-11-29 NOTE — PROGRESS NOTES
[facility] Clinic Note    PCP: Primary Doctor No    Chief Complaint: right renal stone    SUBJECTIVE:       History of Present Illness:  Tereza Weinstein is a 50 y.o. female who presents to clinic for right renal stone. She is Established  to our clinic.     Patient presented to the ED on 11/23 with right flank pain and was discovered to have a 2 cm right renal pelvis stone with obstruction of the UPJ. She underwent stent placement the next day and was discharged home.    Patient has been having significant discomfort from the stent however otherwise doing well. No fevers. Complaining of flank and bladder discomfort.     Working on insurance, states she will have insurance starting at midnight tonight.     Last urine culture: no growth (11/23/20)    Lab Results   Component Value Date    CREATININE 0.6 11/24/2020     Current smoker.    Past medical, family, and social history reviewed as documented in chart with pertinent positive medical, family, and social history detailed in HPI.    Review of patient's allergies indicates:   Allergen Reactions    Penicillins        Past Medical History:   Diagnosis Date    Kidney stone      Past Surgical History:   Procedure Laterality Date    CYSTOSCOPY W/ URETERAL STENT PLACEMENT Right 11/24/2020    Procedure: CYSTOSCOPY, WITH URETERAL STENT INSERTION;  Surgeon: Yuko Hawkins MD;  Location: [facility];  Service: Urology;  Laterality: Right;    HYSTERECTOMY       History reviewed. No pertinent family history.  Social History     Tobacco Use    Smoking status: Current Some Day Smoker     Packs/day: 0.50     Types: Cigarettes   Substance Use Topics    Alcohol use: No     Frequency: Never    Drug use: No        Review of Systems   Constitutional: Negative for chills and fever.   HENT: Negative for trouble swallowing.    Eyes: Negative for pain.   Respiratory: Negative for cough and shortness of breath.    Cardiovascular: Negative for chest pain and  "palpitations.   Gastrointestinal: Negative for abdominal pain, nausea and vomiting.   Genitourinary: Positive for dysuria and flank pain. Negative for difficulty urinating, hematuria and urgency.   Skin: Negative for rash.   Neurological: Negative for weakness.   Psychiatric/Behavioral: Negative for behavioral problems.       OBJECTIVE:     Anticoagulation: no    Estimated body mass index is 30.6 kg/m² as calculated from the following:    Height as of this encounter: 5' 9" (1.753 m).    Weight as of this encounter: 94 kg (207 lb 3.7 oz).    Vital Signs (Most Recent)  Temp: 98 °F (36.7 °C) (11/30/20 1319)  Pulse: 82 (11/30/20 1319)  BP: 106/72 (11/30/20 1319)    Physical Exam  Vitals signs reviewed.   Constitutional:       General: She is not in acute distress.     Appearance: Normal appearance. She is not ill-appearing.   HENT:      Head: Normocephalic and atraumatic.   Eyes:      General: No scleral icterus.  Cardiovascular:      Rate and Rhythm: Normal rate and regular rhythm.   Pulmonary:      Effort: Pulmonary effort is normal. No respiratory distress.   Abdominal:      General: There is no distension.      Palpations: Abdomen is soft.      Tenderness: There is no abdominal tenderness.   Skin:     General: Skin is warm and dry.      Coloration: Skin is not jaundiced.   Neurological:      General: No focal deficit present.      Mental Status: She is alert and oriented to person, place, and time.   Psychiatric:         Mood and Affect: Mood normal.         Behavior: Behavior normal.         BMP  Lab Results   Component Value Date     11/24/2020    K 4.1 11/24/2020     11/24/2020    CO2 24 11/24/2020    BUN 12 11/24/2020    CREATININE 0.6 11/24/2020    CALCIUM 8.9 11/24/2020    ANIONGAP 7 (L) 11/24/2020    ESTGFRAFRICA >60.0 11/24/2020    EGFRNONAA >60.0 11/24/2020       Lab Results   Component Value Date    WBC 8.21 11/24/2020    HGB 12.6 11/24/2020    HCT 38.1 11/24/2020     (H) 11/24/2020    "  11/24/2020       Imaging:  CTRSS 11/23/20:  There is a 22 mm right renal calculus in the renal pelvis centrally  and perhaps lodged at the ureteropelvic junction, with mild  hydronephrosis. There is no additional 2 mm nonobstructing right lower  pole renal calculus, with right perinephric and proximal right  periureteral stranding reflecting edema. There are no left renal or  ureteral calculi, with no left-sided hydroureteronephrosis. Hypodense  left upper pole renal lesion is nonspecific but suggestive of a cyst.    ASSESSMENT     1. Obstructive uropathy    2. Right renal stone        PLAN:     - Plan for right PCNL on 12/11. COVID prior.   - Urine sent for culture  - PT/INR ordered  - Message sent to radiology to either have nephrostomy tube placed on Thursday 12/10 or morning of procedure   - I have explained the indication, risks, benefits, and alternatives of the procedure in detail.  The patient voices understanding and all questions have been answered. Risks including but not limited to bleeding, infection, injury to the lung, liver, spleen, colon, need for additional procedures, incomplete removal of stone, arteriovenous malformation, pseudoaneurysm, hydrothorax or pneumothorax, urinoma, ureteral injury or perforation, DVT, PE, heart attack, stroke, and death were discussed with the patient in depth.  The patient agrees to proceed as planned with right PCNL.      Yuko Hawkins MD

## 2020-11-29 NOTE — H&P (VIEW-ONLY)
[facility] Note    PCP: Primary Doctor No    Chief Complaint: right renal stone    SUBJECTIVE:       History of Present Illness:  Tereza Weinstein is a 50 y.o. female who presents to clinic for right renal stone. She is Established  to our clinic.     Patient presented to the ED on 11/23 with right flank pain and was discovered to have a 2 cm right renal pelvis stone with obstruction of the UPJ. She underwent stent placement the next day and was discharged home.    Patient has been having significant discomfort from the stent however otherwise doing well. No fevers. Complaining of flank and bladder discomfort.     Working on insurance, states she will have insurance starting at midnight tonight.     Last urine culture: no growth (11/23/20)    Lab Results   Component Value Date    CREATININE 0.6 11/24/2020     Current smoker.    Past medical, family, and social history reviewed as documented in chart with pertinent positive medical, family, and social history detailed in HPI.    Review of patient's allergies indicates:   Allergen Reactions    Penicillins        Past Medical History:   Diagnosis Date    Kidney stone      Past Surgical History:   Procedure Laterality Date    CYSTOSCOPY W/ URETERAL STENT PLACEMENT Right 11/24/2020    Procedure: CYSTOSCOPY, WITH URETERAL STENT INSERTION;  Surgeon: Yuko Hawkins MD;  Location: [facility];  Service: Urology;  Laterality: Right;    HYSTERECTOMY       History reviewed. No pertinent family history.  Social History     Tobacco Use    Smoking status: Current Some Day Smoker     Packs/day: 0.50     Types: Cigarettes   Substance Use Topics    Alcohol use: No     Frequency: Never    Drug use: No        Review of Systems   Constitutional: Negative for chills and fever.   HENT: Negative for trouble swallowing.    Eyes: Negative for pain.   Respiratory: Negative for cough and shortness of breath.    Cardiovascular: Negative for chest pain and  "palpitations.   Gastrointestinal: Negative for abdominal pain, nausea and vomiting.   Genitourinary: Positive for dysuria and flank pain. Negative for difficulty urinating, hematuria and urgency.   Skin: Negative for rash.   Neurological: Negative for weakness.   Psychiatric/Behavioral: Negative for behavioral problems.       OBJECTIVE:     Anticoagulation: no    Estimated body mass index is 30.6 kg/m² as calculated from the following:    Height as of this encounter: 5' 9" (1.753 m).    Weight as of this encounter: 94 kg (207 lb 3.7 oz).    Vital Signs (Most Recent)  Temp: 98 °F (36.7 °C) (11/30/20 1319)  Pulse: 82 (11/30/20 1319)  BP: 106/72 (11/30/20 1319)    Physical Exam  Vitals signs reviewed.   Constitutional:       General: She is not in acute distress.     Appearance: Normal appearance. She is not ill-appearing.   HENT:      Head: Normocephalic and atraumatic.   Eyes:      General: No scleral icterus.  Cardiovascular:      Rate and Rhythm: Normal rate and regular rhythm.   Pulmonary:      Effort: Pulmonary effort is normal. No respiratory distress.   Abdominal:      General: There is no distension.      Palpations: Abdomen is soft.      Tenderness: There is no abdominal tenderness.   Skin:     General: Skin is warm and dry.      Coloration: Skin is not jaundiced.   Neurological:      General: No focal deficit present.      Mental Status: She is alert and oriented to person, place, and time.   Psychiatric:         Mood and Affect: Mood normal.         Behavior: Behavior normal.         BMP  Lab Results   Component Value Date     11/24/2020    K 4.1 11/24/2020     11/24/2020    CO2 24 11/24/2020    BUN 12 11/24/2020    CREATININE 0.6 11/24/2020    CALCIUM 8.9 11/24/2020    ANIONGAP 7 (L) 11/24/2020    ESTGFRAFRICA >60.0 11/24/2020    EGFRNONAA >60.0 11/24/2020       Lab Results   Component Value Date    WBC 8.21 11/24/2020    HGB 12.6 11/24/2020    HCT 38.1 11/24/2020     (H) 11/24/2020    "  11/24/2020       Imaging:  CTRSS 11/23/20:  There is a 22 mm right renal calculus in the renal pelvis centrally  and perhaps lodged at the ureteropelvic junction, with mild  hydronephrosis. There is no additional 2 mm nonobstructing right lower  pole renal calculus, with right perinephric and proximal right  periureteral stranding reflecting edema. There are no left renal or  ureteral calculi, with no left-sided hydroureteronephrosis. Hypodense  left upper pole renal lesion is nonspecific but suggestive of a cyst.    ASSESSMENT     1. Obstructive uropathy    2. Right renal stone        PLAN:     - Plan for right PCNL on 12/11. COVID prior.   - Urine sent for culture  - PT/INR ordered  - Message sent to radiology to either have nephrostomy tube placed on Thursday 12/10 or morning of procedure   - I have explained the indication, risks, benefits, and alternatives of the procedure in detail.  The patient voices understanding and all questions have been answered. Risks including but not limited to bleeding, infection, injury to the lung, liver, spleen, colon, need for additional procedures, incomplete removal of stone, arteriovenous malformation, pseudoaneurysm, hydrothorax or pneumothorax, urinoma, ureteral injury or perforation, DVT, PE, heart attack, stroke, and death were discussed with the patient in depth.  The patient agrees to proceed as planned with right PCNL.      Yuko Hawkins MD

## 2020-11-30 ENCOUNTER — OFFICE VISIT (OUTPATIENT)
Dept: UROLOGY | Facility: CLINIC | Age: 50
End: 2020-11-30

## 2020-11-30 VITALS
HEART RATE: 82 BPM | DIASTOLIC BLOOD PRESSURE: 72 MMHG | TEMPERATURE: 98 F | WEIGHT: 207.25 LBS | HEIGHT: 69 IN | SYSTOLIC BLOOD PRESSURE: 106 MMHG | BODY MASS INDEX: 30.7 KG/M2

## 2020-11-30 DIAGNOSIS — N20.0 RIGHT RENAL STONE: ICD-10-CM

## 2020-11-30 DIAGNOSIS — N13.9 OBSTRUCTIVE UROPATHY: Primary | ICD-10-CM

## 2020-11-30 PROCEDURE — 99215 OFFICE O/P EST HI 40 MIN: CPT | Mod: PBBFAC,PN | Performed by: STUDENT IN AN ORGANIZED HEALTH CARE EDUCATION/TRAINING PROGRAM

## 2020-11-30 PROCEDURE — 99999 PR PBB SHADOW E&M-EST. PATIENT-LVL V: CPT | Mod: PBBFAC,,, | Performed by: STUDENT IN AN ORGANIZED HEALTH CARE EDUCATION/TRAINING PROGRAM

## 2020-11-30 PROCEDURE — 99214 OFFICE O/P EST MOD 30 MIN: CPT | Mod: S$PBB,,, | Performed by: STUDENT IN AN ORGANIZED HEALTH CARE EDUCATION/TRAINING PROGRAM

## 2020-11-30 PROCEDURE — 99999 PR PBB SHADOW E&M-EST. PATIENT-LVL V: ICD-10-PCS | Mod: PBBFAC,,, | Performed by: STUDENT IN AN ORGANIZED HEALTH CARE EDUCATION/TRAINING PROGRAM

## 2020-11-30 PROCEDURE — 99214 PR OFFICE/OUTPT VISIT, EST, LEVL IV, 30-39 MIN: ICD-10-PCS | Mod: S$PBB,,, | Performed by: STUDENT IN AN ORGANIZED HEALTH CARE EDUCATION/TRAINING PROGRAM

## 2020-11-30 RX ORDER — OXYBUTYNIN CHLORIDE 5 MG/1
5 TABLET ORAL 3 TIMES DAILY PRN
Qty: 30 TABLET | Refills: 0 | Status: SHIPPED | OUTPATIENT
Start: 2020-11-30 | End: 2020-12-13

## 2020-11-30 RX ORDER — HYDROCODONE BITARTRATE AND ACETAMINOPHEN 5; 325 MG/1; MG/1
1 TABLET ORAL EVERY 6 HOURS PRN
Qty: 11 TABLET | Refills: 0 | Status: ON HOLD | OUTPATIENT
Start: 2020-11-30 | End: 2020-12-12

## 2020-11-30 RX ORDER — HYDROCODONE BITARTRATE AND ACETAMINOPHEN 10; 325 MG/1; MG/1
1 TABLET ORAL
Status: ON HOLD | COMMUNITY
End: 2020-12-10 | Stop reason: HOSPADM

## 2020-12-01 ENCOUNTER — TELEPHONE (OUTPATIENT)
Dept: UROLOGY | Facility: CLINIC | Age: 50
End: 2020-12-01

## 2020-12-01 DIAGNOSIS — N13.9 OBSTRUCTIVE UROPATHY: Primary | ICD-10-CM

## 2020-12-01 NOTE — TELEPHONE ENCOUNTER
Returned call and spoke with patient, she states she was contacted by radiology to do nephro tube on Thurs 12/10/20 and the MD has surgery scheduled for 12/11/20. Patient she thought it was to be done on the same day. Informed message to be sent to the MD to clarify, patient verbally understood.

## 2020-12-01 NOTE — TELEPHONE ENCOUNTER
----- Message from Danielle Boston LPN sent at 12/1/2020  2:32 PM CST -----  Pt would like a call back regarding r/s procedure   ----- Message -----  From: Darrin Spivey  Sent: 12/1/2020   2:14 PM CST  To: [facility] Staff    Type: Needs Medical Advice  Who Called:  Patient     Best Call Back Number: [phone]  Additional Information: Patient states that she would like a callback regarding rescheduling her procedure

## 2020-12-02 ENCOUNTER — CLINICAL SUPPORT (OUTPATIENT)
Dept: UROLOGY | Facility: CLINIC | Age: 50
End: 2020-12-02
Payer: COMMERCIAL

## 2020-12-02 DIAGNOSIS — N20.0 RIGHT RENAL STONE: Primary | ICD-10-CM

## 2020-12-02 PROCEDURE — 99499 NO LOS: ICD-10-PCS | Mod: S$GLB,,, | Performed by: STUDENT IN AN ORGANIZED HEALTH CARE EDUCATION/TRAINING PROGRAM

## 2020-12-02 PROCEDURE — 87086 URINE CULTURE/COLONY COUNT: CPT

## 2020-12-02 PROCEDURE — 99499 UNLISTED E&M SERVICE: CPT | Mod: S$GLB,,, | Performed by: STUDENT IN AN ORGANIZED HEALTH CARE EDUCATION/TRAINING PROGRAM

## 2020-12-02 NOTE — PROGRESS NOTES
Patient arrived to clinic to give urine sample for culture, given clean catch specimen prepared for lab . Patient states she spoke with pre-services and she gave them her new insurance information. She also states she may have yeast infection can med be ordered, message given to MD for advisement.

## 2020-12-03 ENCOUNTER — TELEPHONE (OUTPATIENT)
Dept: UROLOGY | Facility: CLINIC | Age: 50
End: 2020-12-03

## 2020-12-03 RX ORDER — FLUCONAZOLE 150 MG/1
150 TABLET ORAL DAILY
Qty: 1 TABLET | Refills: 0 | Status: SHIPPED | OUTPATIENT
Start: 2020-12-03 | End: 2020-12-04

## 2020-12-03 NOTE — TELEPHONE ENCOUNTER
Call placed to inform diflucan has been sent to pharmacy, no answer, unable to leave message as mailbox is full.

## 2020-12-03 NOTE — TELEPHONE ENCOUNTER
----- Message from Kavitha Escalera sent at 12/3/2020  9:31 AM CST -----  Contact: Pt  Patient: Tereza Weinstein  Phone: [phone]    Pt states Dr. Hawkins was calling in a one time pill for yeast infection and wants Rx called into [facility] on [address] in [place]. Please call with any questions.

## 2020-12-04 ENCOUNTER — TELEPHONE (OUTPATIENT)
Dept: UROLOGY | Facility: CLINIC | Age: 50
End: 2020-12-04

## 2020-12-04 LAB — BACTERIA UR CULT: NO GROWTH

## 2020-12-04 NOTE — TELEPHONE ENCOUNTER
Returned call, spoke with patient, urine culture results given, patient verbally understood.   Name band;

## 2020-12-04 NOTE — TELEPHONE ENCOUNTER
----- Message from Pamela Kim sent at 12/4/2020 12:32 PM CST -----  Regarding: results  Contact: patient  Type:  Test Results    Who Called:  patient  Name of Test (Lab/Mammo/Etc):  urine test  Date of Test:  12/01/20  Ordering Provider:  Dr Hawkins  Where the test was performed:  [facility]  Best Call Back Number:  [phone] (home)   Additional Information:  Please call patient to discuss results. Thanks!

## 2020-12-08 ENCOUNTER — LAB VISIT (OUTPATIENT)
Dept: PRIMARY CARE CLINIC | Facility: CLINIC | Age: 50
End: 2020-12-08
Payer: COMMERCIAL

## 2020-12-08 DIAGNOSIS — N20.0 RIGHT RENAL STONE: ICD-10-CM

## 2020-12-08 PROCEDURE — U0003 INFECTIOUS AGENT DETECTION BY NUCLEIC ACID (DNA OR RNA); SEVERE ACUTE RESPIRATORY SYNDROME CORONAVIRUS 2 (SARS-COV-2) (CORONAVIRUS DISEASE [COVID-19]), AMPLIFIED PROBE TECHNIQUE, MAKING USE OF HIGH THROUGHPUT TECHNOLOGIES AS DESCRIBED BY CMS-2020-01-R: HCPCS

## 2020-12-09 LAB — SARS-COV-2 RNA RESP QL NAA+PROBE: NOT DETECTED

## 2020-12-09 NOTE — PRE-PROCEDURE INSTRUCTIONS
PHONE PRE-OP WAS DONE.  PATIENT VERBALIZED UNDERSTANDING OF INSTRUCTIONS AND EDUCATION FOR PROCEDURE ON 12/11 WITH DR. KRAUSE.

## 2020-12-10 ENCOUNTER — HOSPITAL ENCOUNTER (OUTPATIENT)
Dept: RADIOLOGY | Facility: HOSPITAL | Age: 50
Discharge: HOME | DRG: 670 | End: 2020-12-10
Attending: STUDENT IN AN ORGANIZED HEALTH CARE EDUCATION/TRAINING PROGRAM
Payer: COMMERCIAL

## 2020-12-10 ENCOUNTER — ANESTHESIA EVENT (OUTPATIENT)
Dept: SURGERY | Facility: HOSPITAL | Age: 50
DRG: 670 | End: 2020-12-10
Payer: COMMERCIAL

## 2020-12-10 DIAGNOSIS — N13.9 OBSTRUCTIVE UROPATHY: ICD-10-CM

## 2020-12-10 PROCEDURE — 76942 ECHO GUIDE FOR BIOPSY: CPT | Mod: TC

## 2020-12-10 PROCEDURE — 76942 ECHO GUIDE FOR BIOPSY: CPT | Mod: 26,,, | Performed by: RADIOLOGY

## 2020-12-10 PROCEDURE — 76942 US GUIDED NEEDLE PLACEMENT: ICD-10-PCS | Mod: 26,,, | Performed by: RADIOLOGY

## 2020-12-11 ENCOUNTER — ANESTHESIA (OUTPATIENT)
Dept: SURGERY | Facility: HOSPITAL | Age: 50
DRG: 670 | End: 2020-12-11
Payer: COMMERCIAL

## 2020-12-11 ENCOUNTER — HOSPITAL ENCOUNTER (INPATIENT)
Facility: HOSPITAL | Age: 50
LOS: 1 days | Discharge: HOME | DRG: 670 | End: 2020-12-12
Attending: STUDENT IN AN ORGANIZED HEALTH CARE EDUCATION/TRAINING PROGRAM | Admitting: STUDENT IN AN ORGANIZED HEALTH CARE EDUCATION/TRAINING PROGRAM
Payer: COMMERCIAL

## 2020-12-11 DIAGNOSIS — N20.0 RIGHT RENAL STONE: ICD-10-CM

## 2020-12-11 DIAGNOSIS — N13.9 OBSTRUCTIVE UROPATHY: Primary | ICD-10-CM

## 2020-12-11 LAB
ABO + RH BLD: NORMAL
ANION GAP SERPL CALC-SCNC: 7 MMOL/L (ref 8–16)
BASOPHILS # BLD AUTO: 0.04 K/UL (ref 0–0.2)
BASOPHILS NFR BLD: 0.4 % (ref 0–1.9)
BLD GP AB SCN CELLS X3 SERPL QL: NORMAL
BLOOD GROUP ANTIBODIES SERPL: NORMAL
BUN SERPL-MCNC: 10 MG/DL (ref 6–20)
CALCIUM SERPL-MCNC: 8.7 MG/DL (ref 8.7–10.5)
CHLORIDE SERPL-SCNC: 105 MMOL/L (ref 95–110)
CO2 SERPL-SCNC: 25 MMOL/L (ref 23–29)
CREAT SERPL-MCNC: 0.7 MG/DL (ref 0.5–1.4)
DAT IGG-SP REAG RBC-IMP: NORMAL
DIFFERENTIAL METHOD: ABNORMAL
EOSINOPHIL # BLD AUTO: 0.2 K/UL (ref 0–0.5)
EOSINOPHIL NFR BLD: 2.4 % (ref 0–8)
ERYTHROCYTE [DISTWIDTH] IN BLOOD BY AUTOMATED COUNT: 12.3 % (ref 11.5–14.5)
EST. GFR  (AFRICAN AMERICAN): >60 ML/MIN/1.73 M^2
EST. GFR  (NON AFRICAN AMERICAN): >60 ML/MIN/1.73 M^2
GLUCOSE SERPL-MCNC: 143 MG/DL (ref 70–110)
HCT VFR BLD AUTO: 39 % (ref 37–48.5)
HGB BLD-MCNC: 12.9 G/DL (ref 12–16)
IMM GRANULOCYTES # BLD AUTO: 0.03 K/UL (ref 0–0.04)
IMM GRANULOCYTES NFR BLD AUTO: 0.3 % (ref 0–0.5)
LYMPHOCYTES # BLD AUTO: 3.2 K/UL (ref 1–4.8)
LYMPHOCYTES NFR BLD: 35.4 % (ref 18–48)
MCH RBC QN AUTO: 33.3 PG (ref 27–31)
MCHC RBC AUTO-ENTMCNC: 33.1 G/DL (ref 32–36)
MCV RBC AUTO: 101 FL (ref 82–98)
MONOCYTES # BLD AUTO: 0.7 K/UL (ref 0.3–1)
MONOCYTES NFR BLD: 8.2 % (ref 4–15)
NEUTROPHILS # BLD AUTO: 4.7 K/UL (ref 1.8–7.7)
NEUTROPHILS NFR BLD: 53.3 % (ref 38–73)
NRBC BLD-RTO: 0 /100 WBC
PLATELET # BLD AUTO: 233 K/UL (ref 150–350)
PMV BLD AUTO: 9.1 FL (ref 9.2–12.9)
POTASSIUM SERPL-SCNC: 4.9 MMOL/L (ref 3.5–5.1)
RBC # BLD AUTO: 3.87 M/UL (ref 4–5.4)
SODIUM SERPL-SCNC: 137 MMOL/L (ref 136–145)
WBC # BLD AUTO: 8.9 K/UL (ref 3.9–12.7)

## 2020-12-11 PROCEDURE — 36000709 HC OR TIME LEV III EA ADD 15 MIN: Performed by: STUDENT IN AN ORGANIZED HEALTH CARE EDUCATION/TRAINING PROGRAM

## 2020-12-11 PROCEDURE — 50080 PR PERCUT REMV KID STONE,UP TO 2 CM: ICD-10-PCS | Mod: RT,,, | Performed by: STUDENT IN AN ORGANIZED HEALTH CARE EDUCATION/TRAINING PROGRAM

## 2020-12-11 PROCEDURE — 86901 BLOOD TYPING SEROLOGIC RH(D): CPT

## 2020-12-11 PROCEDURE — 36415 COLL VENOUS BLD VENIPUNCTURE: CPT

## 2020-12-11 PROCEDURE — 25000003 PHARM REV CODE 250: Performed by: ANESTHESIOLOGY

## 2020-12-11 PROCEDURE — D9220A PRA ANESTHESIA: ICD-10-PCS | Mod: CRNA,,, | Performed by: NURSE ANESTHETIST, CERTIFIED REGISTERED

## 2020-12-11 PROCEDURE — 37000009 HC ANESTHESIA EA ADD 15 MINS: Performed by: STUDENT IN AN ORGANIZED HEALTH CARE EDUCATION/TRAINING PROGRAM

## 2020-12-11 PROCEDURE — 25000003 PHARM REV CODE 250: Performed by: NURSE ANESTHETIST, CERTIFIED REGISTERED

## 2020-12-11 PROCEDURE — 94761 N-INVAS EAR/PLS OXIMETRY MLT: CPT

## 2020-12-11 PROCEDURE — 36000708 HC OR TIME LEV III 1ST 15 MIN: Performed by: STUDENT IN AN ORGANIZED HEALTH CARE EDUCATION/TRAINING PROGRAM

## 2020-12-11 PROCEDURE — 50080 PERQ NL/PL LITHOTRP SMPL<2CM: CPT | Mod: RT,,, | Performed by: STUDENT IN AN ORGANIZED HEALTH CARE EDUCATION/TRAINING PROGRAM

## 2020-12-11 PROCEDURE — C1726 CATH, BAL DIL, NON-VASCULAR: HCPCS | Performed by: STUDENT IN AN ORGANIZED HEALTH CARE EDUCATION/TRAINING PROGRAM

## 2020-12-11 PROCEDURE — 71000039 HC RECOVERY, EACH ADD'L HOUR: Performed by: STUDENT IN AN ORGANIZED HEALTH CARE EDUCATION/TRAINING PROGRAM

## 2020-12-11 PROCEDURE — 80048 BASIC METABOLIC PNL TOTAL CA: CPT

## 2020-12-11 PROCEDURE — 37000008 HC ANESTHESIA 1ST 15 MINUTES: Performed by: STUDENT IN AN ORGANIZED HEALTH CARE EDUCATION/TRAINING PROGRAM

## 2020-12-11 PROCEDURE — C1769 GUIDE WIRE: HCPCS | Performed by: STUDENT IN AN ORGANIZED HEALTH CARE EDUCATION/TRAINING PROGRAM

## 2020-12-11 PROCEDURE — 25000003 PHARM REV CODE 250: Performed by: STUDENT IN AN ORGANIZED HEALTH CARE EDUCATION/TRAINING PROGRAM

## 2020-12-11 PROCEDURE — D9220A PRA ANESTHESIA: ICD-10-PCS | Mod: ANES,,, | Performed by: ANESTHESIOLOGY

## 2020-12-11 PROCEDURE — 63600175 PHARM REV CODE 636 W HCPCS: Performed by: NURSE ANESTHETIST, CERTIFIED REGISTERED

## 2020-12-11 PROCEDURE — 71000033 HC RECOVERY, INTIAL HOUR: Performed by: STUDENT IN AN ORGANIZED HEALTH CARE EDUCATION/TRAINING PROGRAM

## 2020-12-11 PROCEDURE — 94799 UNLISTED PULMONARY SVC/PX: CPT

## 2020-12-11 PROCEDURE — 50435 PR EXCHANGE NEPHROSTOMY CATH, INCL GUID, S&I: ICD-10-PCS | Mod: 51,RT,, | Performed by: STUDENT IN AN ORGANIZED HEALTH CARE EDUCATION/TRAINING PROGRAM

## 2020-12-11 PROCEDURE — 86870 RBC ANTIBODY IDENTIFICATION: CPT

## 2020-12-11 PROCEDURE — 63600175 PHARM REV CODE 636 W HCPCS: Performed by: ANESTHESIOLOGY

## 2020-12-11 PROCEDURE — C1729 CATH, DRAINAGE: HCPCS | Performed by: STUDENT IN AN ORGANIZED HEALTH CARE EDUCATION/TRAINING PROGRAM

## 2020-12-11 PROCEDURE — 85025 COMPLETE CBC W/AUTO DIFF WBC: CPT

## 2020-12-11 PROCEDURE — 76000 FLUOROSCOPY <1 HR PHYS/QHP: CPT | Mod: 26,59,, | Performed by: STUDENT IN AN ORGANIZED HEALTH CARE EDUCATION/TRAINING PROGRAM

## 2020-12-11 PROCEDURE — 86880 COOMBS TEST DIRECT: CPT

## 2020-12-11 PROCEDURE — D9220A PRA ANESTHESIA: Mod: CRNA,,, | Performed by: NURSE ANESTHETIST, CERTIFIED REGISTERED

## 2020-12-11 PROCEDURE — 86922 COMPATIBILITY TEST ANTIGLOB: CPT

## 2020-12-11 PROCEDURE — 99900103 DSU ONLY-NO CHARGE-INITIAL HR (STAT): Performed by: STUDENT IN AN ORGANIZED HEALTH CARE EDUCATION/TRAINING PROGRAM

## 2020-12-11 PROCEDURE — 63600175 PHARM REV CODE 636 W HCPCS: Performed by: STUDENT IN AN ORGANIZED HEALTH CARE EDUCATION/TRAINING PROGRAM

## 2020-12-11 PROCEDURE — 50435 EXCHANGE NEPHROSTOMY CATH: CPT | Mod: 51,RT,, | Performed by: STUDENT IN AN ORGANIZED HEALTH CARE EDUCATION/TRAINING PROGRAM

## 2020-12-11 PROCEDURE — 99900104 DSU ONLY-NO CHARGE-EA ADD'L HR (STAT): Performed by: STUDENT IN AN ORGANIZED HEALTH CARE EDUCATION/TRAINING PROGRAM

## 2020-12-11 PROCEDURE — 99900103 DSU ONLY-NO CHARGE-INITIAL HR (STAT)

## 2020-12-11 PROCEDURE — 76000 PR  FLUOROSCOPE EXAMINATION: ICD-10-PCS | Mod: 26,59,, | Performed by: STUDENT IN AN ORGANIZED HEALTH CARE EDUCATION/TRAINING PROGRAM

## 2020-12-11 PROCEDURE — 25500020 PHARM REV CODE 255: Performed by: STUDENT IN AN ORGANIZED HEALTH CARE EDUCATION/TRAINING PROGRAM

## 2020-12-11 PROCEDURE — D9220A PRA ANESTHESIA: Mod: ANES,,, | Performed by: ANESTHESIOLOGY

## 2020-12-11 PROCEDURE — 12000002 HC ACUTE/MED SURGE SEMI-PRIVATE ROOM

## 2020-12-11 PROCEDURE — C1758 CATHETER, URETERAL: HCPCS | Performed by: STUDENT IN AN ORGANIZED HEALTH CARE EDUCATION/TRAINING PROGRAM

## 2020-12-11 RX ORDER — DEXAMETHASONE SODIUM PHOSPHATE 4 MG/ML
INJECTION, SOLUTION INTRA-ARTICULAR; INTRALESIONAL; INTRAMUSCULAR; INTRAVENOUS; SOFT TISSUE
Status: DISCONTINUED | OUTPATIENT
Start: 2020-12-11 | End: 2020-12-11

## 2020-12-11 RX ORDER — SODIUM CHLORIDE 0.9 % (FLUSH) 0.9 %
3 SYRINGE (ML) INJECTION
Status: DISCONTINUED | OUTPATIENT
Start: 2020-12-11 | End: 2020-12-11 | Stop reason: HOSPADM

## 2020-12-11 RX ORDER — POLYETHYLENE GLYCOL 3350 17 G/17G
17 POWDER, FOR SOLUTION ORAL DAILY
Status: DISCONTINUED | OUTPATIENT
Start: 2020-12-11 | End: 2020-12-12 | Stop reason: HOSPADM

## 2020-12-11 RX ORDER — ONDANSETRON HYDROCHLORIDE 2 MG/ML
INJECTION, SOLUTION INTRAMUSCULAR; INTRAVENOUS
Status: DISCONTINUED | OUTPATIENT
Start: 2020-12-11 | End: 2020-12-11

## 2020-12-11 RX ORDER — TAMSULOSIN HYDROCHLORIDE 0.4 MG/1
0.4 CAPSULE ORAL DAILY
Status: DISCONTINUED | OUTPATIENT
Start: 2020-12-11 | End: 2020-12-12 | Stop reason: HOSPADM

## 2020-12-11 RX ORDER — ONDANSETRON 2 MG/ML
4 INJECTION INTRAMUSCULAR; INTRAVENOUS ONCE AS NEEDED
Status: DISCONTINUED | OUTPATIENT
Start: 2020-12-11 | End: 2020-12-11 | Stop reason: HOSPADM

## 2020-12-11 RX ORDER — SUCCINYLCHOLINE CHLORIDE 20 MG/ML
INJECTION INTRAMUSCULAR; INTRAVENOUS
Status: DISCONTINUED | OUTPATIENT
Start: 2020-12-11 | End: 2020-12-11

## 2020-12-11 RX ORDER — ACETAMINOPHEN 500 MG
1000 TABLET ORAL EVERY 8 HOURS
Status: DISCONTINUED | OUTPATIENT
Start: 2020-12-11 | End: 2020-12-12 | Stop reason: HOSPADM

## 2020-12-11 RX ORDER — PROPOFOL 10 MG/ML
VIAL (ML) INTRAVENOUS
Status: DISCONTINUED | OUTPATIENT
Start: 2020-12-11 | End: 2020-12-11

## 2020-12-11 RX ORDER — OXYCODONE HYDROCHLORIDE 10 MG/1
10 TABLET ORAL EVERY 4 HOURS PRN
Status: DISCONTINUED | OUTPATIENT
Start: 2020-12-11 | End: 2020-12-12 | Stop reason: HOSPADM

## 2020-12-11 RX ORDER — VANCOMYCIN HCL IN 5 % DEXTROSE 1G/250ML
1000 PLASTIC BAG, INJECTION (ML) INTRAVENOUS
Status: COMPLETED | OUTPATIENT
Start: 2020-12-11 | End: 2020-12-11

## 2020-12-11 RX ORDER — LIDOCAINE HYDROCHLORIDE 10 MG/ML
1 INJECTION, SOLUTION EPIDURAL; INFILTRATION; INTRACAUDAL; PERINEURAL ONCE
Status: COMPLETED | OUTPATIENT
Start: 2020-12-11 | End: 2020-12-11

## 2020-12-11 RX ORDER — SODIUM CHLORIDE 9 MG/ML
INJECTION, SOLUTION INTRAVENOUS CONTINUOUS
Status: DISCONTINUED | OUTPATIENT
Start: 2020-12-11 | End: 2020-12-12

## 2020-12-11 RX ORDER — SODIUM CHLORIDE, SODIUM LACTATE, POTASSIUM CHLORIDE, CALCIUM CHLORIDE 600; 310; 30; 20 MG/100ML; MG/100ML; MG/100ML; MG/100ML
75 INJECTION, SOLUTION INTRAVENOUS CONTINUOUS
Status: DISCONTINUED | OUTPATIENT
Start: 2020-12-11 | End: 2020-12-12

## 2020-12-11 RX ORDER — MIDAZOLAM HYDROCHLORIDE 1 MG/ML
INJECTION INTRAMUSCULAR; INTRAVENOUS
Status: DISCONTINUED | OUTPATIENT
Start: 2020-12-11 | End: 2020-12-11

## 2020-12-11 RX ORDER — OXYCODONE HYDROCHLORIDE 5 MG/1
5 TABLET ORAL
Status: DISCONTINUED | OUTPATIENT
Start: 2020-12-11 | End: 2020-12-11 | Stop reason: HOSPADM

## 2020-12-11 RX ORDER — ROCURONIUM BROMIDE 10 MG/ML
INJECTION, SOLUTION INTRAVENOUS
Status: DISCONTINUED | OUTPATIENT
Start: 2020-12-11 | End: 2020-12-11

## 2020-12-11 RX ORDER — LIDOCAINE HCL/PF 100 MG/5ML
SYRINGE (ML) INTRAVENOUS
Status: DISCONTINUED | OUTPATIENT
Start: 2020-12-11 | End: 2020-12-11

## 2020-12-11 RX ORDER — FENTANYL CITRATE 50 UG/ML
INJECTION, SOLUTION INTRAMUSCULAR; INTRAVENOUS
Status: DISCONTINUED | OUTPATIENT
Start: 2020-12-11 | End: 2020-12-11

## 2020-12-11 RX ORDER — OXYBUTYNIN CHLORIDE 5 MG/1
5 TABLET ORAL 3 TIMES DAILY PRN
Status: DISCONTINUED | OUTPATIENT
Start: 2020-12-11 | End: 2020-12-12 | Stop reason: HOSPADM

## 2020-12-11 RX ORDER — DIPHENHYDRAMINE HYDROCHLORIDE 50 MG/ML
25 INJECTION INTRAMUSCULAR; INTRAVENOUS EVERY 6 HOURS PRN
Status: DISCONTINUED | OUTPATIENT
Start: 2020-12-11 | End: 2020-12-11 | Stop reason: HOSPADM

## 2020-12-11 RX ORDER — SCOLOPAMINE TRANSDERMAL SYSTEM 1 MG/1
1 PATCH, EXTENDED RELEASE TRANSDERMAL
Status: DISCONTINUED | OUTPATIENT
Start: 2020-12-11 | End: 2020-12-12 | Stop reason: HOSPADM

## 2020-12-11 RX ORDER — SODIUM CHLORIDE 0.9 % (FLUSH) 0.9 %
10 SYRINGE (ML) INJECTION
Status: DISCONTINUED | OUTPATIENT
Start: 2020-12-11 | End: 2020-12-12 | Stop reason: HOSPADM

## 2020-12-11 RX ORDER — ACETAMINOPHEN 10 MG/ML
INJECTION, SOLUTION INTRAVENOUS
Status: DISCONTINUED | OUTPATIENT
Start: 2020-12-11 | End: 2020-12-11

## 2020-12-11 RX ORDER — HYDROMORPHONE HYDROCHLORIDE 2 MG/ML
0.2 INJECTION, SOLUTION INTRAMUSCULAR; INTRAVENOUS; SUBCUTANEOUS EVERY 5 MIN PRN
Status: DISCONTINUED | OUTPATIENT
Start: 2020-12-11 | End: 2020-12-11 | Stop reason: HOSPADM

## 2020-12-11 RX ORDER — OXYCODONE HYDROCHLORIDE 5 MG/1
5 TABLET ORAL EVERY 4 HOURS PRN
Status: DISCONTINUED | OUTPATIENT
Start: 2020-12-11 | End: 2020-12-12 | Stop reason: HOSPADM

## 2020-12-11 RX ORDER — PANTOPRAZOLE SODIUM 40 MG/1
40 TABLET, DELAYED RELEASE ORAL DAILY
Status: DISCONTINUED | OUTPATIENT
Start: 2020-12-11 | End: 2020-12-12 | Stop reason: HOSPADM

## 2020-12-11 RX ORDER — FENTANYL CITRATE 50 UG/ML
25 INJECTION, SOLUTION INTRAMUSCULAR; INTRAVENOUS EVERY 5 MIN PRN
Status: COMPLETED | OUTPATIENT
Start: 2020-12-11 | End: 2020-12-11

## 2020-12-11 RX ADMIN — FENTANYL CITRATE 25 MCG: 0.05 INJECTION, SOLUTION INTRAMUSCULAR; INTRAVENOUS at 11:12

## 2020-12-11 RX ADMIN — LIDOCAINE HYDROCHLORIDE 10 MG: 10 INJECTION, SOLUTION EPIDURAL; INFILTRATION; INTRACAUDAL; PERINEURAL at 07:12

## 2020-12-11 RX ADMIN — FENTANYL CITRATE 100 MCG: 50 INJECTION, SOLUTION INTRAMUSCULAR; INTRAVENOUS at 09:12

## 2020-12-11 RX ADMIN — SUCCINYLCHOLINE CHLORIDE 100 MG: 20 INJECTION, SOLUTION INTRAMUSCULAR; INTRAVENOUS; PARENTERAL at 09:12

## 2020-12-11 RX ADMIN — SODIUM CHLORIDE, SODIUM GLUCONATE, SODIUM ACETATE, POTASSIUM CHLORIDE, MAGNESIUM CHLORIDE, SODIUM PHOSPHATE, DIBASIC, AND POTASSIUM PHOSPHATE: .53; .5; .37; .037; .03; .012; .00082 INJECTION, SOLUTION INTRAVENOUS at 07:12

## 2020-12-11 RX ADMIN — GLYCOPYRROLATE 0.2 MG: 0.2 INJECTION, SOLUTION INTRAMUSCULAR; INTRAVITREAL at 08:12

## 2020-12-11 RX ADMIN — ACETAMINOPHEN 1000 MG: 500 TABLET ORAL at 03:12

## 2020-12-11 RX ADMIN — MIDAZOLAM HYDROCHLORIDE 1 MG: 1 INJECTION, SOLUTION INTRAMUSCULAR; INTRAVENOUS at 09:12

## 2020-12-11 RX ADMIN — PANTOPRAZOLE SODIUM 40 MG: 40 TABLET, DELAYED RELEASE ORAL at 01:12

## 2020-12-11 RX ADMIN — ACETAMINOPHEN 1000 MG: 500 TABLET ORAL at 10:12

## 2020-12-11 RX ADMIN — GENTAMICIN SULFATE 160 MG: 40 INJECTION, SOLUTION INTRAMUSCULAR; INTRAVENOUS at 07:12

## 2020-12-11 RX ADMIN — SODIUM CHLORIDE 100 ML/HR: 0.9 INJECTION, SOLUTION INTRAVENOUS at 10:12

## 2020-12-11 RX ADMIN — FENTANYL CITRATE 25 MCG: 0.05 INJECTION, SOLUTION INTRAMUSCULAR; INTRAVENOUS at 10:12

## 2020-12-11 RX ADMIN — ROCURONIUM BROMIDE 5 MG: 10 INJECTION, SOLUTION INTRAVENOUS at 09:12

## 2020-12-11 RX ADMIN — LIDOCAINE HYDROCHLORIDE 75 MG: 20 INJECTION, SOLUTION INTRAVENOUS at 09:12

## 2020-12-11 RX ADMIN — ONDANSETRON 4 MG: 2 INJECTION, SOLUTION INTRAMUSCULAR; INTRAVENOUS at 08:12

## 2020-12-11 RX ADMIN — ROCURONIUM BROMIDE 20 MG: 10 INJECTION, SOLUTION INTRAVENOUS at 09:12

## 2020-12-11 RX ADMIN — MIDAZOLAM HYDROCHLORIDE 2 MG: 1 INJECTION, SOLUTION INTRAMUSCULAR; INTRAVENOUS at 08:12

## 2020-12-11 RX ADMIN — TAMSULOSIN HYDROCHLORIDE 0.4 MG: 0.4 CAPSULE ORAL at 01:12

## 2020-12-11 RX ADMIN — OXYCODONE HYDROCHLORIDE 10 MG: 10 TABLET ORAL at 07:12

## 2020-12-11 RX ADMIN — POLYETHYLENE GLYCOL 3350 17 G: 17 POWDER, FOR SOLUTION ORAL at 01:12

## 2020-12-11 RX ADMIN — OXYBUTYNIN CHLORIDE 5 MG: 5 TABLET ORAL at 05:12

## 2020-12-11 RX ADMIN — PROPOFOL 100 MG: 10 INJECTION, EMULSION INTRAVENOUS at 09:12

## 2020-12-11 RX ADMIN — OXYCODONE HYDROCHLORIDE 10 MG: 10 TABLET ORAL at 03:12

## 2020-12-11 RX ADMIN — SCOPALAMINE 1 PATCH: 1 PATCH, EXTENDED RELEASE TRANSDERMAL at 07:12

## 2020-12-11 RX ADMIN — ACETAMINOPHEN 1000 MG: 10 INJECTION, SOLUTION INTRAVENOUS at 09:12

## 2020-12-11 RX ADMIN — VANCOMYCIN HYDROCHLORIDE 1000 MG: 1 INJECTION, POWDER, LYOPHILIZED, FOR SOLUTION INTRAVENOUS at 07:12

## 2020-12-11 RX ADMIN — DEXAMETHASONE SODIUM PHOSPHATE 4 MG: 4 INJECTION, SOLUTION INTRA-ARTICULAR; INTRALESIONAL; INTRAMUSCULAR; INTRAVENOUS; SOFT TISSUE at 09:12

## 2020-12-11 RX ADMIN — SUGAMMADEX 100 MG: 100 INJECTION, SOLUTION INTRAVENOUS at 10:12

## 2020-12-11 RX ADMIN — ONDANSETRON 4 MG: 2 INJECTION, SOLUTION INTRAMUSCULAR; INTRAVENOUS at 09:12

## 2020-12-11 NOTE — PLAN OF CARE
Plan of care reviewed with patient. Patient verbalized complete understanding. Afebrile throughout shift. Antibiotics as scheduled. IV fluids as ordered. PRN pain medicine as needed. Educated to and encouraged to use incentive spirometer. Nephrostomy and murguia output documented. Ambulated in recio. All fall precautions maintained. Bed in lowest position, locked, call light within reach. Side rails up x's 2. Slip resistant socks maintained.

## 2020-12-11 NOTE — PLAN OF CARE
Arrived with mother, assisted to change the procedural dressing due to continued leakage. Provided with clean gauze and a tegaderm. Site has no redness, and tube is clamped. Lab present and mother at side.

## 2020-12-11 NOTE — ANESTHESIA PROCEDURE NOTES
Intubation  Performed by: Daljit Rudolph CRNA  Authorized by: Edd Goncalves MD     Intubation:     Attempted By:  CRNA    Difficult Airway Encountered?: No      Complications:  None    Airway Device:  Oral endotracheal tube    Airway Device Size:  7.5    Style/Cuff Inflation:  Cuffed    Inflation Amount (mL):  4    Tube secured:  21    Placement Verified By:  Capnometry    Complicating Factors:  None    Findings Post-Intubation:  BS equal bilateral

## 2020-12-11 NOTE — ANESTHESIA POSTPROCEDURE EVALUATION
Anesthesia Post Evaluation    Patient: Tereza Weinstein    Procedure(s) Performed: Procedure(s) (LRB):  NEPHROLITHOTRIPSY, PERCUTANEOUS (Right)  NEPHROSTOGRAM, ANTEGRADE (Right)    Final Anesthesia Type: general    Patient location during evaluation: PACU  Patient participation: Yes- Able to Participate  Level of consciousness: awake and alert and oriented  Post-procedure vital signs: reviewed and stable  Pain management: adequate  Airway patency: patent    PONV status at discharge: No PONV  Anesthetic complications: no      Cardiovascular status: blood pressure returned to baseline  Respiratory status: unassisted, spontaneous ventilation and room air  Hydration status: euvolemic  Follow-up not needed.          Vitals Value Taken Time   /56 12/11/20 1018   Temp  12/11/20 1018   Pulse 70 12/11/20 1018   Resp 25 12/11/20 1018   SpO2 96 % 12/11/20 1018   Vitals shown include unvalidated device data.      No case tracking events are documented in the log.      Pain/Manasa Score: Pain Rating Prior to Med Admin: 4 (12/10/2020 11:08 AM)  Pain Rating Post Med Admin: 0 (12/10/2020  1:15 PM)  Manasa Score: 10 (12/10/2020 10:27 AM)

## 2020-12-11 NOTE — PLAN OF CARE
Released from Pacu when criteria met pain controlled skin w+d No nausea No emesis dsg dry intact on r flank  Sleepy but orient aox4 encouraged deep breaths Pt has all belongings  With mom IS taught up to 500 only murguia out 300 lt yellow urine nephrostomy tube bloody serosang   drainage noted via gravity

## 2020-12-11 NOTE — ANESTHESIA PREPROCEDURE EVALUATION
12/11/2020  Tereza Weinstein is a 50 y.o., female.    Anesthesia Evaluation    I have reviewed the Patient Summary Reports.    I have reviewed the Nursing Notes. I have reviewed the NPO Status.   I have reviewed the Medications.     Review of Systems  Anesthesia Hx:  Hx of Anesthetic complications    Renal/:   Chronic Renal Disease        Physical Exam  General:  Well nourished    Airway/Jaw/Neck:  Airway Findings: Mouth Opening: Normal Tongue: Normal  General Airway Assessment: Adult, Good  Mallampati: II  Improves to II with phonation.  TM Distance: 4-6 cm      Dental:  Dental Findings: In tact   Chest/Lungs:  Chest/Lungs Findings: Clear to auscultation, Normal Respiratory Rate     Heart/Vascular:  Heart Findings: Rate: Normal  Rhythm: Regular Rhythm  Sounds: Normal  Heart murmur: negative       Mental Status:  Mental Status Findings:  Cooperative, Alert and Oriented         Anesthesia Plan  Type of Anesthesia, risks & benefits discussed:  Anesthesia Type:  general  Patient's Preference:   Intra-op Monitoring Plan: standard ASA monitors  Intra-op Monitoring Plan Comments:   Post Op Pain Control Plan:   Post Op Pain Control Plan Comments:   Induction:   IV  Beta Blocker:  Patient is not currently on a Beta-Blocker (No further documentation required).       Informed Consent: Patient understands risks and agrees with Anesthesia plan.  Questions answered. Anesthesia consent signed with patient.  ASA Score: 2     Day of Surgery Review of History & Physical: I have interviewed and examined the patient. I have reviewed the patient's H&P dated:  There are no significant changes.  H&P update referred to the surgeon.  H&P completed by Anesthesiologist.       Ready For Surgery From Anesthesia Perspective.

## 2020-12-11 NOTE — TRANSFER OF CARE
"Anesthesia Transfer of Care Note    Patient: Tereza Weinstein    Procedure(s) Performed: Procedure(s) (LRB):  NEPHROLITHOTRIPSY, PERCUTANEOUS (Right)  NEPHROSTOGRAM, ANTEGRADE (Right)    Patient location: PACU    Anesthesia Type: general    Transport from OR: Transported from OR on 2-3 L/min O2 by NC with adequate spontaneous ventilation    Post pain: adequate analgesia    Post assessment: no apparent anesthetic complications and tolerated procedure well    Post vital signs: stable    Level of consciousness: sedated    Nausea/Vomiting: no nausea/vomiting    Complications: none    Transfer of care protocol was followed      Last vitals:   Visit Vitals  BP (!) 111/58 (BP Location: Left arm, Patient Position: Lying)   Pulse 66   Temp 36.5 °C (97.7 °F) (Temporal)   Resp 19   Ht 5' 9" (1.753 m)   Wt 93.8 kg (206 lb 12.7 oz)   SpO2 97%   Breastfeeding No   BMI 30.54 kg/m²     "

## 2020-12-11 NOTE — OP NOTE
[facility]  Operative Note    Date: 12/11/2020    Pre-Op Diagnosis: Right 2 cm enal stone    Patient Active Problem List    Diagnosis Date Noted    Right renal stone 12/11/2020    Obstructive uropathy 11/23/2020    Hydronephrosis with urinary obstruction due to ureteral calculus 11/23/2020    Nicotine use disorder 11/23/2020      Post-Op Diagnosis: same    Procedure(s) Performed:   1.  Right PCNL (> 2 cm)  2.  Right nephrostomy tube placement  3.  Right antegrade nephrostogram  4.  Dilation of nephrostomy tube tract  5.  Fluoro < 1 h    Specimen(s): Right renal stone    Staff Surgeon: Yuko Hawkins MD     Anesthesia: General endotracheal anesthesia    Indications: Tereza Weinstein is a 50 y.o. female with a right renal stones presenting for definitive management.      Findings:  1.  Uncomplicated pcnl   2.  All stone removed following flexible pyeloscopy     Estimated Blood Loss: 20 cc    Drains:   1.  22 Fr nephrostomy tube  2.  16 Fr urethral murguia catheter  3.  Previously placed right JJ ureteral stent     Procedure in detail:  After the risks, benefits and possible complications of the procedure were explained, the patient elected to undergo the above procedures and informed consent was obtained. The patient was taken to the OR and placed under anesthesia. Pre-operative antibiotics were administered. Time out was performed.  SCDs were applied and working prior to the procedure.    Our attention was turned to the patient's right nephrostomy tube which was already in place. A motion wire was inserted through the nephrostomy tube and advanced to the level of the bladder. This was confirmed on fluoroscopy. The nephrostomy tube was then removed keeping the wire in place. A dual lumen access catheter was inserted over the wire. A super stiff wire was then inserted through the second lumen and advanced to the level of the bladder. The dual lumen was then removed keeping both wires in  place.    I then dilated the nephrostomy tract under fluoroscopic guidance using the 30 fr nephromax balloon dilator.  The 30 fr percutaneous renal access sheath was then advanced over the dilator balloon into the renal collecting system. The balloon was then deflated and removed keeping the super stiff wire in place.    The nephroscope was advanced into the sheath. The UPJ was identified. The stones were identified.  These were broken up using the shock pulse device.    Flexible pyeloscopy was then performed to evaluate all the calyces. There were no stone fragments identified at the end of flexible pyeloscopy.      A 22 Fr [place] tipped murguia catheter was placed into the collecting system as a nephrostomy tube. Antegrade nephrostogram was performed showing no extravasation of contrast and no filling defects.  3 mL of water was placed in the balloon.     The sheath was removed over the catheter leaving the nephrotomy tube in place. The skin was closed with 3-0 vicryl in a vertical mattress fashion. The nephrostomy tube was secured to the skin using a 2-0 nylon.      A sterile compressive dressing was placed. The patient was then transferred back to PACU in stable condition.     Disposition:  The patient will be admitted to the floor for postoperative monitoring. A CTRSS will be obtained in the AM.    Yuko Hawkins MD

## 2020-12-11 NOTE — INTERVAL H&P NOTE
The patient has been examined and the H&P has been reviewed:    I concur with the findings and no changes have occurred since H&P was written.    Surgery risks, benefits and alternative options discussed and understood by patient/family.      Active Hospital Problems    Diagnosis  POA    Right renal stone [N20.0]  Yes      Resolved Hospital Problems   No resolved problems to display.

## 2020-12-12 VITALS
DIASTOLIC BLOOD PRESSURE: 58 MMHG | WEIGHT: 206.81 LBS | HEIGHT: 69 IN | RESPIRATION RATE: 18 BRPM | OXYGEN SATURATION: 96 % | SYSTOLIC BLOOD PRESSURE: 103 MMHG | BODY MASS INDEX: 30.63 KG/M2 | HEART RATE: 58 BPM | TEMPERATURE: 98 F

## 2020-12-12 DIAGNOSIS — N20.0 RIGHT RENAL STONE: Primary | ICD-10-CM

## 2020-12-12 LAB
ANION GAP SERPL CALC-SCNC: 7 MMOL/L (ref 8–16)
BASOPHILS # BLD AUTO: 0.02 K/UL (ref 0–0.2)
BASOPHILS NFR BLD: 0.1 % (ref 0–1.9)
BUN SERPL-MCNC: 9 MG/DL (ref 6–20)
CALCIUM SERPL-MCNC: 8.8 MG/DL (ref 8.7–10.5)
CHLORIDE SERPL-SCNC: 107 MMOL/L (ref 95–110)
CO2 SERPL-SCNC: 25 MMOL/L (ref 23–29)
CREAT SERPL-MCNC: 0.7 MG/DL (ref 0.5–1.4)
DIFFERENTIAL METHOD: ABNORMAL
EOSINOPHIL # BLD AUTO: 0 K/UL (ref 0–0.5)
EOSINOPHIL NFR BLD: 0.1 % (ref 0–8)
ERYTHROCYTE [DISTWIDTH] IN BLOOD BY AUTOMATED COUNT: 12.1 % (ref 11.5–14.5)
EST. GFR  (AFRICAN AMERICAN): >60 ML/MIN/1.73 M^2
EST. GFR  (NON AFRICAN AMERICAN): >60 ML/MIN/1.73 M^2
GLUCOSE SERPL-MCNC: 152 MG/DL (ref 70–110)
HCT VFR BLD AUTO: 38.3 % (ref 37–48.5)
HGB BLD-MCNC: 12 G/DL (ref 12–16)
IMM GRANULOCYTES # BLD AUTO: 0.06 K/UL (ref 0–0.04)
IMM GRANULOCYTES NFR BLD AUTO: 0.4 % (ref 0–0.5)
LYMPHOCYTES # BLD AUTO: 3 K/UL (ref 1–4.8)
LYMPHOCYTES NFR BLD: 20.3 % (ref 18–48)
MCH RBC QN AUTO: 31.8 PG (ref 27–31)
MCHC RBC AUTO-ENTMCNC: 31.3 G/DL (ref 32–36)
MCV RBC AUTO: 102 FL (ref 82–98)
MONOCYTES # BLD AUTO: 1.5 K/UL (ref 0.3–1)
MONOCYTES NFR BLD: 10 % (ref 4–15)
NEUTROPHILS # BLD AUTO: 10.2 K/UL (ref 1.8–7.7)
NEUTROPHILS NFR BLD: 69.1 % (ref 38–73)
NRBC BLD-RTO: 0 /100 WBC
PLATELET # BLD AUTO: 221 K/UL (ref 150–350)
PMV BLD AUTO: 9.8 FL (ref 9.2–12.9)
POTASSIUM SERPL-SCNC: 4.3 MMOL/L (ref 3.5–5.1)
RBC # BLD AUTO: 3.77 M/UL (ref 4–5.4)
SODIUM SERPL-SCNC: 139 MMOL/L (ref 136–145)
WBC # BLD AUTO: 14.81 K/UL (ref 3.9–12.7)

## 2020-12-12 PROCEDURE — 25000003 PHARM REV CODE 250: Performed by: STUDENT IN AN ORGANIZED HEALTH CARE EDUCATION/TRAINING PROGRAM

## 2020-12-12 PROCEDURE — 85025 COMPLETE CBC W/AUTO DIFF WBC: CPT

## 2020-12-12 PROCEDURE — 94799 UNLISTED PULMONARY SVC/PX: CPT

## 2020-12-12 PROCEDURE — 80048 BASIC METABOLIC PNL TOTAL CA: CPT

## 2020-12-12 PROCEDURE — 36415 COLL VENOUS BLD VENIPUNCTURE: CPT

## 2020-12-12 PROCEDURE — 94761 N-INVAS EAR/PLS OXIMETRY MLT: CPT

## 2020-12-12 RX ORDER — TAMSULOSIN HYDROCHLORIDE 0.4 MG/1
0.4 CAPSULE ORAL DAILY
Qty: 30 CAPSULE | Refills: 0 | Status: SHIPPED | OUTPATIENT
Start: 2020-12-12 | End: 2021-03-24 | Stop reason: ALTCHOICE

## 2020-12-12 RX ORDER — HYDROCODONE BITARTRATE AND ACETAMINOPHEN 5; 325 MG/1; MG/1
1 TABLET ORAL EVERY 6 HOURS PRN
Qty: 11 TABLET | Refills: 0 | Status: ON HOLD | OUTPATIENT
Start: 2020-12-12 | End: 2020-12-23 | Stop reason: HOSPADM

## 2020-12-12 RX ORDER — SULFAMETHOXAZOLE AND TRIMETHOPRIM 800; 160 MG/1; MG/1
1 TABLET ORAL 2 TIMES DAILY
Qty: 10 TABLET | Refills: 0 | Status: SHIPPED | OUTPATIENT
Start: 2020-12-12 | End: 2020-12-17

## 2020-12-12 RX ADMIN — ACETAMINOPHEN 1000 MG: 500 TABLET ORAL at 05:12

## 2020-12-12 RX ADMIN — POLYETHYLENE GLYCOL 3350 17 G: 17 POWDER, FOR SOLUTION ORAL at 08:12

## 2020-12-12 RX ADMIN — OXYCODONE HYDROCHLORIDE 10 MG: 10 TABLET ORAL at 02:12

## 2020-12-12 RX ADMIN — PANTOPRAZOLE SODIUM 40 MG: 40 TABLET, DELAYED RELEASE ORAL at 08:12

## 2020-12-12 RX ADMIN — TAMSULOSIN HYDROCHLORIDE 0.4 MG: 0.4 CAPSULE ORAL at 08:12

## 2020-12-12 RX ADMIN — OXYCODONE HYDROCHLORIDE 5 MG: 5 TABLET ORAL at 08:12

## 2020-12-12 NOTE — PLAN OF CARE
Pt is cleared from  for D/C.  Pt had no needs. Mother is here to pick-up patient.       12/12/20 1011   Final Note   Assessment Type Final Discharge Note   Anticipated Discharge Disposition Home   Hospital Follow Up  Appt(s) scheduled? No  (Pt will select her PCP)

## 2020-12-12 NOTE — DISCHARGE INSTRUCTIONS
"Postoperative Instructions  No heavy lifting greater than 10 pounds or a gallon of milk  Do not strain to have a bowel movement  No strenuous exercise  Ok to start showering, no baths for 2 weeks.  No driving while you are on narcotic pain medications     Call the doctor if:   Temperature is greater than 101F   Persistent vomiting and inability to keep food down   Inability to pee          Discharge Instructions: After Your Surgery/Procedure  Youve just had surgery. During surgery you were given medicine called anesthesia to keep you relaxed and free of pain. After surgery you may have some pain or nausea. This is common. Here are some tips for feeling better and getting well after surgery.     Stay on schedule with your medication.   Going home  Your doctor or nurse will show you how to take care of yourself when you go home. He or she will also answer your questions. Have an adult family member or friend drive you home.      For your safety we recommend these precaution for the first 24 hours after your procedure:  · Do not drive or use heavy equipment.  · Do not make important decisions or sign legal papers.  · Do not drink alcohol.  · Have someone stay with you, if needed. He or she can watch for problems and help keep you safe.  · Your concentration, balance, coordination, and judgement may be impaired for many hours after anesthesia.  Use caution when ambulating or standing up.     · You may feel weak and "washed out" after anesthesia and surgery.      Subtle residual effects of general anesthesia or sedation with regional / local anesthesia can last more than 24 hours.  Rest for the remainder of the day or longer if your Doctor/Surgeon has advised you to do so.  Although you may feel normal within the first 24 hours, your reflexes and mental ability may be impaired without you realizing it.  You may feel dizzy, lightheaded or sleepy for 24 hours or longer.      Be sure to go to all follow-up visits with " your doctor. And rest after your surgery for as long as your doctor tells you to.  Coping with pain  If you have pain after surgery, pain medicine will help you feel better. Take it as told, before pain becomes severe. Also, ask your doctor or pharmacist about other ways to control pain. This might be with heat, ice, or relaxation. And follow any other instructions your surgeon or nurse gives you.  Tips for taking pain medicine  To get the best relief possible, remember these points:  · Pain medicines can upset your stomach. Taking them with a little food may help.  · Most pain relievers taken by mouth need at least 20 to 30 minutes to start to work.  · Taking medicine on a schedule can help you remember to take it. Try to time your medicine so that you can take it before starting an activity. This might be before you get dressed, go for a walk, or sit down for dinner.  · Constipation is a common side effect of pain medicines. Call your doctor before taking any medicines such as laxatives or stool softeners to help ease constipation. Also ask if you should skip any foods. Drinking lots of fluids and eating foods such as fruits and vegetables that are high in fiber can also help. Remember, do not take laxatives unless your surgeon has prescribed them.  · Drinking alcohol and taking pain medicine can cause dizziness and slow your breathing. It can even be deadly. Do not drink alcohol while taking pain medicine.  · Pain medicine can make you react more slowly to things. Do not drive or run machinery while taking pain medicine.  Your health care provider may tell you to take acetaminophen to help ease your pain. Ask him or her how much you are supposed to take each day. Acetaminophen or other pain relievers may interact with your prescription medicines or other over-the-counter (OTC) drugs. Some prescription medicines have acetaminophen and other ingredients. Using both prescription and OTC acetaminophen for pain can  cause you to overdose. Read the labels on your OTC medicines with care. This will help you to clearly know the list of ingredients, how much to take, and any warnings. It may also help you not take too much acetaminophen. If you have questions or do not understand the information, ask your pharmacist or health care provider to explain it to you before you take the OTC medicine.  Managing nausea  Some people have an upset stomach after surgery. This is often because of anesthesia, pain, or pain medicine, or the stress of surgery. These tips will help you handle nausea and eat healthy foods as you get better. If you were on a special food plan before surgery, ask your doctor if you should follow it while you get better. These tips may help:  · Do not push yourself to eat. Your body will tell you when to eat and how much.  · Start off with clear liquids and soup. They are easier to digest.  · Next try semi-solid foods, such as mashed potatoes, applesauce, and gelatin, as you feel ready.  · Slowly move to solid foods. Dont eat fatty, rich, or spicy foods at first.  · Do not force yourself to have 3 large meals a day. Instead eat smaller amounts more often.  · Take pain medicines with a small amount of solid food, such as crackers or toast, to avoid nausea.     Call your surgeon if  · You still have pain an hour after taking medicine. The medicine may not be strong enough.  · You feel too sleepy, dizzy, or groggy. The medicine may be too strong.  · You have side effects like nausea, vomiting, or skin changes, such as rash, itching, or hives.       If you have obstructive sleep apnea  You were given anesthesia medicine during surgery to keep you comfortable and free of pain. After surgery, you may have more apnea spells because of this medicine and other medicines you were given. The spells may last longer than usual.   At home:  · Keep using the continuous positive airway pressure (CPAP) device when you sleep. Unless  your health care provider tells you not to, use it when you sleep, day or night. CPAP is a common device used to treat obstructive sleep apnea.  · Talk with your provider before taking any pain medicine, muscle relaxants, or sedatives. Your provider will tell you about the possible dangers of taking these medicines.  © 6857-8032 [employer]. [address] [place] [address]. All rights reserved. This information is not intended as a substitute for professional medical care. Always follow your healthcare professional's instructions.    Post op instructions for prevention of DVT  What is deep vein thrombosis?  Deep vein thrombosis (DVT) is the medical term for blood clots in the deep veins of the leg.  These blood clots can be dangerous.  A DVT can block a blood vessel and keep blood from getting where it needs to go.  Another problem is that the clot can travel to other parts of the body such as the lungs.  A clot that travels to the lungs is called a pulmonary embolus (PE) and can cause serious problems with breathing which can lead to death.  Am I at risk for DVT/PE?  If you are not very active, you are at risk of DVT.  Anyone confined to bed, sitting for long periods of time, recovering from surgery, etc. increases the risk of DVT.  Other risk factors are cancer diagnosis, certain medications, estrogen replacement in any form,older age, obesity, pregnancy, smoking, history of clotting disorders, and dehydration.  How will I know if I have a DVT?   Swelling in the lower leg   Pain   Warmth, redness, hardness or bulging of the vein  If you have any of these symptoms, call your doctors office right away.  Some people will not have any symptoms until the clot moves to the lungs.  What are the symptoms of a PE?   Panting, shortness of breath, or trouble breathing   Sharp, knife-like chest pain when you breathe   Coughing or coughing up blood   Rapid heartbeat  If you have any of these  symptoms or get worse quickly, call 911 for emergency treatment.  How can I prevent a DVT?   Avoid long periods of inactivity and dont cross your legs--get up and walk around every hour or so.   Stay active--walking after surgery is highly encouraged.  This means you should get out of the house and walk in the neighborhood.  Going up and down stairs will not impair healing (unless advised against such activity by your doctor).     Drink plenty of noncaffeinated, nonalcoholic fluids each day to prevent dehydration.   Wear special support stockings, if they have been advised by your doctor.   If you travel, stop at least once an hour and walk around.   Avoid smoking (assistance with stopping is available through your healthcare provider)  Always notify your doctor if you are not able to follow the post operative instructions that are given to you at the time of discharge.  It may be necessary to prescribe one of the medications available to prevent DVT.  We hope your stay was comfortable as you heal now, mend and rest.    For we have enjoyed taking care of you by giving your our best.    And as you get better, by regaining your health and strength;   We count it as a privilege to have served you and hope your time at [facility] was well spent.      Thank  You!!!

## 2020-12-12 NOTE — RESPIRATORY THERAPY
12/12/20 0724   Patient Assessment/Suction   Level of Consciousness (AVPU) alert   PRE-TX-O2   O2 Device (Oxygen Therapy) room air   SpO2 99 %   Pulse Oximetry Type Intermittent   $ Pulse Oximetry - Multiple Charge Pulse Oximetry - Multiple   Incentive Spirometer   $ Incentive Spirometer Charges done with encouragement   Administration (IS) mouthpiece   Number of Repetitions (IS) 10   Level Incentive Spirometer (mL) 1500   Patient Tolerance (IS) good

## 2020-12-12 NOTE — PLAN OF CARE
Cm completed the assessment with pt at bedside. Pt lives alone and independent in care. Pt works Full Time at [employer]. Pt drives to appointments and activities.   Pt does not have a PCP. Pt verbalized her insurance is new and she will get a PCP.  Insurance verified as BCBS. Pt denies diabetes, dialysis and coumadin.  Disposition:  Pt will discharge to home.  Pt's mother will pick her up when medically cleared for discharge. No needs verbalized at this time.       12/12/20 1006   Discharge Assessment   Assessment Type Discharge Planning Assessment   Confirmed/corrected address and phone number on facesheet? Yes   Assessment information obtained from? Patient   Expected Length of Stay (days) 2   Communicated expected length of stay with patient/caregiver yes   Prior to hospitilization cognitive status: Alert/Oriented   Prior to hospitalization functional status: Independent   Current cognitive status: Alert/Oriented   Current Functional Status: Independent   Facility Arrived From: home   Lives With alone   Able to Return to Prior Arrangements yes   Is patient able to care for self after discharge? Yes   Who are your caregiver(s) and their phone number(s)? mother(did not wish to give her number)   Patient's perception of discharge disposition home or selfcare   Readmission Within the Last 30 Days no previous admission in last 30 days   Patient currently being followed by outpatient case management? No   Patient currently receives any other outside agency services? No   Equipment Currently Used at Home none   Do you have any problems affording any of your prescribed medications? No   Is the patient taking medications as prescribed? yes   Does the patient have transportation home? Yes   Transportation Anticipated family or friend will provide   Dialysis Name and Scheduled days na   Does the patient receive services at the Coumadin Clinic? No   Discharge Plan A Home with family   Discharge Plan B Home with family   DME  Needed Upon Discharge  none   Patient/Family in Agreement with Plan yes

## 2020-12-12 NOTE — DISCHARGE SUMMARY
[facility]  Urology  Discharge Summary      Patient Name: Tereza Weinstein  MRN: [id number]  Admission Date: 12/11/2020  Hospital Length of Stay: 1 days  Discharge Date and Time: 12/12/2020 10:30 AM  Attending Physician: Yuko Hawkins MD  Discharging Provider: Yuko Hawkins MD  Primary Care Physician: Primary Doctor No    HPI: Tereza Weinstein is a 50 y.o. female admitted following right PCNL.     Procedure(s) (LRB):  NEPHROLITHOTRIPSY, PERCUTANEOUS (Right)  NEPHROSTOGRAM, ANTEGRADE (Right)     Indwelling Lines/Drains at time of discharge:   Lines/Drains/Airways     None                 Hospital Course (synopsis of major diagnoses, care, treatment, and services provided during the course of the hospital stay): Patient admitted following right PCNL. She tolerated the procedure well with no complications. On POD 1 her labs remained stable. CT showed no residual stone. Her murguia and neph tube were removed and she was discharged home.      Consults:     Significant Diagnostic Studies: see chart review    Pending Diagnostic Studies:     None          Final Active Diagnoses:    Diagnosis Date Noted POA    PRINCIPAL PROBLEM:  Right renal stone [N20.0] 12/11/2020 Yes    Obstructive uropathy [N13.9] 11/23/2020 Yes    Hydronephrosis with urinary obstruction due to ureteral calculus [N13.2] 11/23/2020 Yes    Nicotine use disorder [F17.200] 11/23/2020 Yes      Problems Resolved During this Admission:       Discharged Condition: good    Disposition: Home or Self Care    Follow Up:  Follow-up Information     Yuko Hawkins MD On 12/23/2020.    Specialty: Urology  Why: stent removal  Contact information:  [address] [place] [id number]  [phone]                 Patient Instructions:      Notify your health care provider if you experience any of the following:  temperature >100.4     Notify your health care provider if you experience any of the following:  persistent  nausea and vomiting or diarrhea     Notify your health care provider if you experience any of the following:  severe uncontrolled pain     Notify your health care provider if you experience any of the following:  redness, tenderness, or signs of infection (pain, swelling, redness, odor or green/yellow discharge around incision site)     Remove dressing in 48 hours     Activity as tolerated     Medications:  Reconciled Home Medications:      Medication List      START taking these medications    sulfamethoxazole-trimethoprim 800-160mg 800-160 mg Tab  Commonly known as: BACTRIM DS  Take 1 tablet by mouth 2 (two) times daily. for 5 days     tamsulosin 0.4 mg Cap  Commonly known as: FLOMAX  Take 1 capsule (0.4 mg total) by mouth once daily.        CONTINUE taking these medications    HYDROcodone-acetaminophen 5-325 mg per tablet  Commonly known as: NORCO  Take 1 tablet by mouth every 6 (six) hours as needed for Pain.     oxybutynin 5 MG Tab  Commonly known as: DITROPAN  Take 1 tablet (5 mg total) by mouth 3 (three) times daily as needed (bladder pain).        STOP taking these medications    diphenhydrAMINE 25 mg capsule  Commonly known as: BENADRYL     levoFLOXacin 750 MG tablet  Commonly known as: LEVAQUIN            Time spent on the discharge of patient: 20 minutes    Yuko Hawkins MD  Urology  [facility]

## 2020-12-12 NOTE — PLAN OF CARE
POC reviewed with patient, patient verbalized understanding, AAOX4, VSS, RA, no c/o sob, pain medication administered per MD order, IVF maintained, nephrostomy tube maintained, murguia maintained, IS maintained, I&O maintained, safety maintained, patient visualized, appears sleep, eyes closed, respirations even and unlabored, bed in lowest position, side rails up X2, call light and table within reach, bed alarm on an audible, NAD, call light answered in person, all needs met, will continue to monitor.

## 2020-12-12 NOTE — CARE UPDATE
12/11/20 1849   Patient Assessment/Suction   Level of Consciousness (AVPU) alert   Respiratory Effort Normal;Unlabored   Expansion/Accessory Muscles/Retractions no use of accessory muscles;no retractions;expansion symmetric   All Lung Fields Breath Sounds clear   Rhythm/Pattern, Respiratory depth regular;pattern regular;unlabored   Cough Frequency no cough   PRE-TX-O2   O2 Device (Oxygen Therapy) room air   SpO2 97 %   Pulse Oximetry Type Intermittent   $ Pulse Oximetry - Multiple Charge Pulse Oximetry - Multiple   Pulse 71   Resp 18   Incentive Spirometer   $ Incentive Spirometer Charges done with encouragement   Incentive Spirometer Predicted Level (mL) 2000   Administration (IS) instruction provided, follow-up   Number of Repetitions (IS) 10   Level Incentive Spirometer (mL) 1000   Patient Tolerance (IS) good     Patient encouraged to do deep breathing exercises independently.

## 2020-12-12 NOTE — NURSING
POC reviewed with pt; pt verbalized understanding. Pt AAO x 4. Pt ambulates with standby assistance. Pt voided in toilet w/o difficulty prior to discharge. Room air. PRN pain medication administered. Pt afebrile. PIV cdi; IV removed prior to d/c. IVF administered. Nuno and nephrostomy tube removed by MD. Pt instructed to remove dressing in 48 hrs. Discharge instructions reviewed with pt; pt verbalized understanding. Pt advised on f/u appts and home medication regimen. Pt escorted off unit via wheelchair by PCT.

## 2020-12-15 LAB
BLD PROD TYP BPU: NORMAL
BLD PROD TYP BPU: NORMAL
BLOOD UNIT EXPIRATION DATE: NORMAL
BLOOD UNIT EXPIRATION DATE: NORMAL
BLOOD UNIT TYPE CODE: 6200
BLOOD UNIT TYPE CODE: 6200
BLOOD UNIT TYPE: NORMAL
BLOOD UNIT TYPE: NORMAL
CODING SYSTEM: NORMAL
CODING SYSTEM: NORMAL
DISPENSE STATUS: NORMAL
DISPENSE STATUS: NORMAL
NUM UNITS TRANS PACKED RBC: NORMAL
NUM UNITS TRANS PACKED RBC: NORMAL

## 2020-12-16 ENCOUNTER — PATIENT OUTREACH (OUTPATIENT)
Dept: ADMINISTRATIVE | Facility: CLINIC | Age: 50
End: 2020-12-16

## 2020-12-16 NOTE — PROGRESS NOTES
C3 nurse attempted to contact patient. No answer. The following message was left for the patient to return the call:  Good Morning  I am a nurse calling on behalf of [facility] from the [facility].  This is a Transitional Care Call for Tereza Weinstein . When you have a moment please contact us at [phone] or [phone] Monday through Friday, between the hours of 8 am to 4 pm. We look forward to speaking with you. On behalf of [facility] have a nice day.    The patient does not have a scheduled HOSFU appointment within 7-14 days post hospital discharge date 12/12/2020Message sent to Physician staff to assist with HOSFU appointment scheduling.

## 2020-12-20 ENCOUNTER — LAB VISIT (OUTPATIENT)
Dept: PRIMARY CARE CLINIC | Facility: CLINIC | Age: 50
End: 2020-12-20
Payer: COMMERCIAL

## 2020-12-20 DIAGNOSIS — N20.0 RIGHT RENAL STONE: ICD-10-CM

## 2020-12-20 PROCEDURE — U0003 INFECTIOUS AGENT DETECTION BY NUCLEIC ACID (DNA OR RNA); SEVERE ACUTE RESPIRATORY SYNDROME CORONAVIRUS 2 (SARS-COV-2) (CORONAVIRUS DISEASE [COVID-19]), AMPLIFIED PROBE TECHNIQUE, MAKING USE OF HIGH THROUGHPUT TECHNOLOGIES AS DESCRIBED BY CMS-2020-01-R: HCPCS

## 2020-12-21 LAB — SARS-COV-2 RNA RESP QL NAA+PROBE: NOT DETECTED

## 2020-12-22 DIAGNOSIS — N13.9 OBSTRUCTIVE UROPATHY: ICD-10-CM

## 2020-12-22 DIAGNOSIS — N20.0 RIGHT RENAL STONE: Primary | ICD-10-CM

## 2020-12-22 RX ORDER — OXYBUTYNIN CHLORIDE 5 MG/1
5 TABLET ORAL 3 TIMES DAILY PRN
Qty: 30 TABLET | Refills: 0 | Status: SHIPPED | OUTPATIENT
Start: 2020-12-22 | End: 2021-05-10

## 2020-12-22 NOTE — TELEPHONE ENCOUNTER
Pt requesting refill on - oxybutynin (DITROPAN) 5 MG Tab [Yuko Hawkins MD]     Preferred pharmacy: [id number] [place] [address] AT [facility] [address]   Delivery method: Pickup

## 2020-12-23 ENCOUNTER — HOSPITAL ENCOUNTER (OUTPATIENT)
Facility: AMBULARY SURGERY CENTER | Age: 50
Discharge: HOME | End: 2020-12-23
Attending: STUDENT IN AN ORGANIZED HEALTH CARE EDUCATION/TRAINING PROGRAM | Admitting: STUDENT IN AN ORGANIZED HEALTH CARE EDUCATION/TRAINING PROGRAM
Payer: COMMERCIAL

## 2020-12-23 DIAGNOSIS — N20.0 RIGHT RENAL STONE: Primary | ICD-10-CM

## 2020-12-23 DIAGNOSIS — N20.0 NEPHROLITHIASIS: ICD-10-CM

## 2020-12-23 PROCEDURE — 52310 PR CYSTOSCOPY,REMV CALCULUS,SIMPLE: ICD-10-PCS | Mod: 58,,, | Performed by: STUDENT IN AN ORGANIZED HEALTH CARE EDUCATION/TRAINING PROGRAM

## 2020-12-23 PROCEDURE — 52310 CYSTOSCOPY AND TREATMENT: CPT | Mod: 58,,, | Performed by: STUDENT IN AN ORGANIZED HEALTH CARE EDUCATION/TRAINING PROGRAM

## 2020-12-23 PROCEDURE — 52310 CYSTOSCOPY AND TREATMENT: CPT | Performed by: STUDENT IN AN ORGANIZED HEALTH CARE EDUCATION/TRAINING PROGRAM

## 2020-12-23 RX ORDER — WATER 1 ML/ML
IRRIGANT IRRIGATION
Status: DISCONTINUED | OUTPATIENT
Start: 2020-12-23 | End: 2020-12-23 | Stop reason: HOSPADM

## 2020-12-23 RX ORDER — SULFAMETHOXAZOLE AND TRIMETHOPRIM 800; 160 MG/1; MG/1
1 TABLET ORAL 2 TIMES DAILY
Qty: 6 TABLET | Refills: 0 | Status: SHIPPED | OUTPATIENT
Start: 2020-12-23 | End: 2020-12-26

## 2020-12-23 RX ORDER — LIDOCAINE HYDROCHLORIDE 20 MG/ML
JELLY TOPICAL
Status: DISCONTINUED | OUTPATIENT
Start: 2020-12-23 | End: 2020-12-23 | Stop reason: HOSPADM

## 2020-12-23 NOTE — TELEPHONE ENCOUNTER
----- Message from Yuko Hawkins MD sent at 12/23/2020 10:35 AM CST -----  Please schedule follow up in 3 months with a renal US and KUB.

## 2020-12-23 NOTE — PLAN OF CARE
Stable states ready to go home monet po fluids, voided, pain tolerable, ambulated to car with RN to son

## 2020-12-23 NOTE — OP NOTE
[facility] [place]  Operative Note    Date: 12/23/2020    Pre-Op Diagnosis:   - Right renal stone s/p PCNL    Patient Active Problem List   Diagnosis    Obstructive uropathy    Hydronephrosis with urinary obstruction due to ureteral calculus    Nicotine use disorder    Right renal stone    Nephrolithiasis       Post-Op Diagnosis: same    Procedure(s) Performed:   1.  Cystoscopy with right stent removal    Specimen(s): none    Staff Surgeon: Yuko Hawkins MD    Anesthesia: Local anesthesia topical 2% lidocaine gel    Indications: Tereza Weinstein is a 50 y.o. female who is s/p right PCNL on 12/11/20. She presents today for stent removal.     Findings:   - uncomplicated right stent removal     Estimated Blood Loss: min    Procedure in Detail:  After risks, benefits and possible complications of cystoscopy were explained, the patient elected to undergo the procedure and informed consent was obtained. All questions were answered in the hang-operative area. The patient was transferred to the cystoscopy suite and placed in the dorsal lithotomy position and prepped and draped in the usual sterile fashion.  Time out was performed.     A flexible cystoscope was introduced into the bladder per urethra. This passed easily.  The entire urethra was visualized which showed no masses or strictures.  The stent was visualized emerging from the right ureteral orifice.  There was minimal encrustation noted.  The stent was grasped with stent grasper and removed in its entirety.    The patient tolerated the procedure well and was transferred to recovery in stable condition.    Disposition:  The patient will follow up in 3 months with a renal ultrasound and KUB.      Yuko Hawkins MD

## 2020-12-23 NOTE — TELEPHONE ENCOUNTER
Message received for 3 month f/u appt with us and jemalb prior, appts made, should print out on discharge paperwork.

## 2020-12-23 NOTE — INTERVAL H&P NOTE
The patient has been examined and the H&P has been reviewed:    I concur with the findings and no changes have occurred since H&P was written.    Surgery risks, benefits and alternative options discussed and understood by patient/family.    Patient is appropriate for ASC.      Active Hospital Problems    Diagnosis  POA    Nephrolithiasis [N20.0]  Yes      Resolved Hospital Problems   No resolved problems to display.

## 2020-12-23 NOTE — DISCHARGE SUMMARY
[facility]  Discharge Note  Short Stay    Admit Date: 12/23/2020    Discharge Date and Time: 12/23/2020 10:32 AM      Attending Physician: Yuko Hawkins MD     Discharge Provider: Yuko Hawkins    Diagnoses:  Active Hospital Problems    Diagnosis  POA    *Nephrolithiasis [N20.0]  Yes    Obstructive uropathy [N13.9]  Yes    Nicotine use disorder [F17.200]  Yes      Resolved Hospital Problems   No resolved problems to display.       Discharged Condition: good    Hospital Course: Patient was admitted for stent removal and tolerated the procedure well with no complications. The patient was discharged home in good condition on the same day.       Final Diagnoses: Same as principal problem.    Disposition: Home or Self Care    Follow up/Patient Instructions:    Medications:  Reconciled Home Medications:   Current Discharge Medication List      START taking these medications    Details   sulfamethoxazole-trimethoprim 800-160mg (BACTRIM DS) 800-160 mg Tab Take 1 tablet by mouth 2 (two) times daily. for 3 days  Qty: 6 tablet, Refills: 0         CONTINUE these medications which have NOT CHANGED    Details   oxybutynin (DITROPAN) 5 MG Tab Take 1 tablet (5 mg total) by mouth 3 (three) times daily as needed.  Qty: 30 tablet, Refills: 0    Associated Diagnoses: Obstructive uropathy      tamsulosin (FLOMAX) 0.4 mg Cap Take 1 capsule (0.4 mg total) by mouth once daily.  Qty: 30 capsule, Refills: 0         STOP taking these medications       HYDROcodone-acetaminophen (NORCO) 5-325 mg per tablet Comments:   Reason for Stopping:             Discharge Procedure Orders   X-Ray Abdomen AP 1 View   Standing Status: Future Standing Exp. Date: 12/23/21     Order Specific Question Answer Comments   Reason for Exam: nephrolithiasis      US Retroperitoneal Limited   Standing Status: Future Standing Exp. Date: 12/23/21     Order Specific Question Answer Comments   Reason for Exam: renal ultrasound      Call MD for:   temperature >100.4     Call MD for:  persistent nausea and vomiting     Call MD for:  severe uncontrolled pain     No dressing needed     Activity as tolerated     Follow-up Information     Yuko Hawkins MD In 3 months.    Specialty: Urology  Why: s/p PCNL  Contact information:  [address] [place] [address] [address]  [address] [place] [address]  [phone]                   Yuko Hawkins MD  Urology Department

## 2020-12-28 VITALS
SYSTOLIC BLOOD PRESSURE: 148 MMHG | BODY MASS INDEX: 30.63 KG/M2 | WEIGHT: 206.81 LBS | DIASTOLIC BLOOD PRESSURE: 77 MMHG | RESPIRATION RATE: 20 BRPM | OXYGEN SATURATION: 97 % | TEMPERATURE: 99 F | HEART RATE: 76 BPM | HEIGHT: 69 IN

## 2021-01-29 ENCOUNTER — PATIENT MESSAGE (OUTPATIENT)
Dept: UROLOGY | Facility: CLINIC | Age: 51
End: 2021-01-29

## 2021-03-16 ENCOUNTER — HOSPITAL ENCOUNTER (OUTPATIENT)
Dept: RADIOLOGY | Facility: HOSPITAL | Age: 51
Discharge: HOME | End: 2021-03-16
Attending: STUDENT IN AN ORGANIZED HEALTH CARE EDUCATION/TRAINING PROGRAM
Payer: COMMERCIAL

## 2021-03-16 DIAGNOSIS — N20.0 RIGHT RENAL STONE: ICD-10-CM

## 2021-03-16 PROCEDURE — 74018 RADEX ABDOMEN 1 VIEW: CPT | Mod: TC

## 2021-03-16 PROCEDURE — 76770 US EXAM ABDO BACK WALL COMP: CPT | Mod: TC

## 2021-03-24 ENCOUNTER — OFFICE VISIT (OUTPATIENT)
Dept: UROLOGY | Facility: CLINIC | Age: 51
End: 2021-03-24
Payer: COMMERCIAL

## 2021-03-24 VITALS
WEIGHT: 206.81 LBS | DIASTOLIC BLOOD PRESSURE: 81 MMHG | BODY MASS INDEX: 30.63 KG/M2 | HEIGHT: 69 IN | HEART RATE: 88 BPM | SYSTOLIC BLOOD PRESSURE: 129 MMHG | RESPIRATION RATE: 18 BRPM

## 2021-03-24 DIAGNOSIS — N20.0 RIGHT RENAL STONE: Primary | ICD-10-CM

## 2021-03-24 DIAGNOSIS — F17.200 NICOTINE USE DISORDER: ICD-10-CM

## 2021-03-24 PROCEDURE — 1126F AMNT PAIN NOTED NONE PRSNT: CPT | Mod: S$GLB,,, | Performed by: STUDENT IN AN ORGANIZED HEALTH CARE EDUCATION/TRAINING PROGRAM

## 2021-03-24 PROCEDURE — 99213 OFFICE O/P EST LOW 20 MIN: CPT | Mod: S$GLB,,, | Performed by: STUDENT IN AN ORGANIZED HEALTH CARE EDUCATION/TRAINING PROGRAM

## 2021-03-24 PROCEDURE — 99999 PR PBB SHADOW E&M-EST. PATIENT-LVL III: CPT | Mod: PBBFAC,,, | Performed by: STUDENT IN AN ORGANIZED HEALTH CARE EDUCATION/TRAINING PROGRAM

## 2021-03-24 PROCEDURE — 1126F PR PAIN SEVERITY QUANTIFIED, NO PAIN PRESENT: ICD-10-PCS | Mod: S$GLB,,, | Performed by: STUDENT IN AN ORGANIZED HEALTH CARE EDUCATION/TRAINING PROGRAM

## 2021-03-24 PROCEDURE — 3008F BODY MASS INDEX DOCD: CPT | Mod: CPTII,S$GLB,, | Performed by: STUDENT IN AN ORGANIZED HEALTH CARE EDUCATION/TRAINING PROGRAM

## 2021-03-24 PROCEDURE — 99213 PR OFFICE/OUTPT VISIT, EST, LEVL III, 20-29 MIN: ICD-10-PCS | Mod: S$GLB,,, | Performed by: STUDENT IN AN ORGANIZED HEALTH CARE EDUCATION/TRAINING PROGRAM

## 2021-03-24 PROCEDURE — 99999 PR PBB SHADOW E&M-EST. PATIENT-LVL III: ICD-10-PCS | Mod: PBBFAC,,, | Performed by: STUDENT IN AN ORGANIZED HEALTH CARE EDUCATION/TRAINING PROGRAM

## 2021-03-24 PROCEDURE — 3008F PR BODY MASS INDEX (BMI) DOCUMENTED: ICD-10-PCS | Mod: CPTII,S$GLB,, | Performed by: STUDENT IN AN ORGANIZED HEALTH CARE EDUCATION/TRAINING PROGRAM

## 2021-04-09 NOTE — PROGRESS NOTES
Urology Progress Note    Patient is POD 1 s/p right PCNL.     Well this morning.  Pain is well controlled.  No nausea or vomiting.  Ambulating well.  Tolerating regular diet.    Nuno draining clear yellow.  Nephrostomy tube draining light pink tinged urine.  Abdomen soft and nontender.  Urine output nearly 3.5 L.  CT scan shows no residual stone in right kidney.    Lab Results   Component Value Date    WBC 14.81 (H) 12/12/2020    HGB 12.0 12/12/2020    HCT 38.3 12/12/2020     (H) 12/12/2020     12/12/2020     BMP  Lab Results   Component Value Date     12/12/2020    K 4.3 12/12/2020     12/12/2020    CO2 25 12/12/2020    BUN 9 12/12/2020    CREATININE 0.7 12/12/2020    CALCIUM 8.8 12/12/2020    ANIONGAP 7 (L) 12/12/2020    ESTGFRAFRICA >60 12/12/2020    EGFRNONAA >60 12/12/2020     Plan:     - Nuno and nephrostomy tube removed  - Hep-Lock IV  - regular diet  - ambulate/out of bed  - p.o. pain control  - discharge home after voiding trial.  Patient will follow up on 12/23 for stent removal.  Discharged home with Norco, Flomax, Bactrim x5 days.      Yuko Hawkins MD  Urology Department     [General Appearance - Well Developed] : well developed [General Appearance - Well Nourished] : well nourished [Abdomen Soft] : soft [Skin Color & Pigmentation] : normal skin color and pigmentation [Skin Turgor] : supple [Heart Rate And Rhythm] : Heart rate and rhythm were normal [] : no respiratory distress [Oriented To Time, Place, And Person] : oriented to person, place, and time [Normal Station and Gait] : the gait and station were normal for the patient's age [No Focal Deficits] : no focal deficits [No Palpable Adenopathy] : no palpable adenopathy

## 2021-04-27 ENCOUNTER — TELEPHONE (OUTPATIENT)
Dept: UROLOGY | Facility: CLINIC | Age: 51
End: 2021-04-27

## 2021-04-27 ENCOUNTER — PATIENT MESSAGE (OUTPATIENT)
Dept: UROLOGY | Facility: CLINIC | Age: 51
End: 2021-04-27

## 2021-04-29 ENCOUNTER — PATIENT MESSAGE (OUTPATIENT)
Dept: UROLOGY | Facility: CLINIC | Age: 51
End: 2021-04-29

## 2021-05-06 ENCOUNTER — PATIENT MESSAGE (OUTPATIENT)
Dept: RESEARCH | Facility: HOSPITAL | Age: 51
End: 2021-05-06

## 2021-05-10 ENCOUNTER — OFFICE VISIT (OUTPATIENT)
Dept: UROLOGY | Facility: CLINIC | Age: 51
End: 2021-05-10
Payer: COMMERCIAL

## 2021-05-10 ENCOUNTER — TELEPHONE (OUTPATIENT)
Dept: UROLOGY | Facility: CLINIC | Age: 51
End: 2021-05-10

## 2021-05-10 DIAGNOSIS — N20.0 RIGHT RENAL STONE: Primary | ICD-10-CM

## 2021-05-10 PROCEDURE — 99214 PR OFFICE/OUTPT VISIT, EST, LEVL IV, 30-39 MIN: ICD-10-PCS | Mod: 95,,, | Performed by: STUDENT IN AN ORGANIZED HEALTH CARE EDUCATION/TRAINING PROGRAM

## 2021-05-10 PROCEDURE — 99214 OFFICE O/P EST MOD 30 MIN: CPT | Mod: 95,,, | Performed by: STUDENT IN AN ORGANIZED HEALTH CARE EDUCATION/TRAINING PROGRAM

## 2021-05-10 RX ORDER — HYDROCHLOROTHIAZIDE 25 MG/1
25 TABLET ORAL DAILY
Qty: 30 TABLET | Refills: 11 | Status: SHIPPED | OUTPATIENT
Start: 2021-05-10 | End: 2022-05-10

## 2021-05-17 ENCOUNTER — LAB VISIT (OUTPATIENT)
Dept: LAB | Facility: HOSPITAL | Age: 51
End: 2021-05-17
Attending: STUDENT IN AN ORGANIZED HEALTH CARE EDUCATION/TRAINING PROGRAM
Payer: COMMERCIAL

## 2021-05-17 DIAGNOSIS — N20.0 RIGHT RENAL STONE: ICD-10-CM

## 2021-05-17 LAB
ANION GAP SERPL CALC-SCNC: 10 MMOL/L (ref 8–16)
BUN SERPL-MCNC: 16 MG/DL (ref 6–20)
CALCIUM SERPL-MCNC: 10.3 MG/DL (ref 8.7–10.5)
CHLORIDE SERPL-SCNC: 97 MMOL/L (ref 95–110)
CO2 SERPL-SCNC: 30 MMOL/L (ref 23–29)
CREAT SERPL-MCNC: 0.8 MG/DL (ref 0.5–1.4)
EST. GFR  (AFRICAN AMERICAN): >60 ML/MIN/1.73 M^2
EST. GFR  (NON AFRICAN AMERICAN): >60 ML/MIN/1.73 M^2
GLUCOSE SERPL-MCNC: 129 MG/DL (ref 70–110)
POTASSIUM SERPL-SCNC: 3.7 MMOL/L (ref 3.5–5.1)
SODIUM SERPL-SCNC: 137 MMOL/L (ref 136–145)

## 2021-05-17 PROCEDURE — 36415 COLL VENOUS BLD VENIPUNCTURE: CPT | Performed by: STUDENT IN AN ORGANIZED HEALTH CARE EDUCATION/TRAINING PROGRAM

## 2021-05-17 PROCEDURE — 80048 BASIC METABOLIC PNL TOTAL CA: CPT | Performed by: STUDENT IN AN ORGANIZED HEALTH CARE EDUCATION/TRAINING PROGRAM

## 2021-06-21 ENCOUNTER — TELEPHONE (OUTPATIENT)
Dept: UROLOGY | Facility: CLINIC | Age: 51
End: 2021-06-21

## 2021-06-21 DIAGNOSIS — N20.0 RIGHT RENAL STONE: Primary | ICD-10-CM

## 2021-07-14 ENCOUNTER — OFFICE VISIT (OUTPATIENT)
Dept: URGENT CARE | Facility: CLINIC | Age: 51
End: 2021-07-14
Payer: COMMERCIAL

## 2021-07-14 VITALS
SYSTOLIC BLOOD PRESSURE: 123 MMHG | OXYGEN SATURATION: 95 % | BODY MASS INDEX: 31.1 KG/M2 | HEART RATE: 94 BPM | HEIGHT: 69 IN | TEMPERATURE: 99 F | DIASTOLIC BLOOD PRESSURE: 76 MMHG | RESPIRATION RATE: 16 BRPM | WEIGHT: 210 LBS

## 2021-07-14 DIAGNOSIS — R05.9 COUGH: ICD-10-CM

## 2021-07-14 DIAGNOSIS — J06.9 VIRAL URI WITH COUGH: Primary | ICD-10-CM

## 2021-07-14 DIAGNOSIS — Z20.822 COVID-19 VIRUS NOT DETECTED: ICD-10-CM

## 2021-07-14 LAB
CTP QC/QA: YES
SARS-COV-2 RDRP RESP QL NAA+PROBE: NEGATIVE

## 2021-07-14 PROCEDURE — 3008F PR BODY MASS INDEX (BMI) DOCUMENTED: ICD-10-PCS | Mod: CPTII,S$GLB,, | Performed by: NURSE PRACTITIONER

## 2021-07-14 PROCEDURE — 99204 OFFICE O/P NEW MOD 45 MIN: CPT | Mod: 25,S$GLB,, | Performed by: NURSE PRACTITIONER

## 2021-07-14 PROCEDURE — 3008F BODY MASS INDEX DOCD: CPT | Mod: CPTII,S$GLB,, | Performed by: NURSE PRACTITIONER

## 2021-07-14 PROCEDURE — 96372 PR INJECTION,THERAP/PROPH/DIAG2ST, IM OR SUBCUT: ICD-10-PCS | Mod: S$GLB,,, | Performed by: NURSE PRACTITIONER

## 2021-07-14 PROCEDURE — U0002: ICD-10-PCS | Mod: QW,S$GLB,, | Performed by: NURSE PRACTITIONER

## 2021-07-14 PROCEDURE — 96372 THER/PROPH/DIAG INJ SC/IM: CPT | Mod: S$GLB,,, | Performed by: NURSE PRACTITIONER

## 2021-07-14 PROCEDURE — U0002 COVID-19 LAB TEST NON-CDC: HCPCS | Mod: QW,S$GLB,, | Performed by: NURSE PRACTITIONER

## 2021-07-14 PROCEDURE — 99204 PR OFFICE/OUTPT VISIT, NEW, LEVL IV, 45-59 MIN: ICD-10-PCS | Mod: 25,S$GLB,, | Performed by: NURSE PRACTITIONER

## 2021-07-14 RX ORDER — FLUTICASONE PROPIONATE 50 MCG
1 SPRAY, SUSPENSION (ML) NASAL DAILY
Qty: 11.1 ML | Refills: 0 | Status: SHIPPED | OUTPATIENT
Start: 2021-07-14 | End: 2021-09-09

## 2021-07-14 RX ORDER — DEXAMETHASONE SODIUM PHOSPHATE 4 MG/ML
8 INJECTION, SOLUTION INTRA-ARTICULAR; INTRALESIONAL; INTRAMUSCULAR; INTRAVENOUS; SOFT TISSUE
Status: COMPLETED | OUTPATIENT
Start: 2021-07-14 | End: 2021-07-14

## 2021-07-14 RX ORDER — CETIRIZINE HYDROCHLORIDE 10 MG/1
10 TABLET ORAL DAILY
Qty: 30 TABLET | Refills: 0 | Status: SHIPPED | OUTPATIENT
Start: 2021-07-14 | End: 2022-01-13 | Stop reason: ALTCHOICE

## 2021-07-14 RX ADMIN — DEXAMETHASONE SODIUM PHOSPHATE 8 MG: 4 INJECTION, SOLUTION INTRA-ARTICULAR; INTRALESIONAL; INTRAMUSCULAR; INTRAVENOUS; SOFT TISSUE at 02:07

## 2021-09-06 ENCOUNTER — OFFICE VISIT (OUTPATIENT)
Dept: URGENT CARE | Facility: CLINIC | Age: 51
End: 2021-09-06
Payer: COMMERCIAL

## 2021-09-06 VITALS
DIASTOLIC BLOOD PRESSURE: 74 MMHG | HEART RATE: 77 BPM | WEIGHT: 209.19 LBS | SYSTOLIC BLOOD PRESSURE: 117 MMHG | BODY MASS INDEX: 30.98 KG/M2 | OXYGEN SATURATION: 97 % | HEIGHT: 69 IN | TEMPERATURE: 99 F | RESPIRATION RATE: 16 BRPM

## 2021-09-06 DIAGNOSIS — U07.1 COVID-19 VIRUS DETECTED: Primary | ICD-10-CM

## 2021-09-06 PROCEDURE — 3078F PR MOST RECENT DIASTOLIC BLOOD PRESSURE < 80 MM HG: ICD-10-PCS | Mod: CPTII,S$GLB,, | Performed by: NURSE PRACTITIONER

## 2021-09-06 PROCEDURE — 1159F MED LIST DOCD IN RCRD: CPT | Mod: CPTII,S$GLB,, | Performed by: NURSE PRACTITIONER

## 2021-09-06 PROCEDURE — 99213 OFFICE O/P EST LOW 20 MIN: CPT | Mod: S$GLB,,, | Performed by: NURSE PRACTITIONER

## 2021-09-06 PROCEDURE — 1160F PR REVIEW ALL MEDS BY PRESCRIBER/CLIN PHARMACIST DOCUMENTED: ICD-10-PCS | Mod: CPTII,S$GLB,, | Performed by: NURSE PRACTITIONER

## 2021-09-06 PROCEDURE — 3078F DIAST BP <80 MM HG: CPT | Mod: CPTII,S$GLB,, | Performed by: NURSE PRACTITIONER

## 2021-09-06 PROCEDURE — 1160F RVW MEDS BY RX/DR IN RCRD: CPT | Mod: CPTII,S$GLB,, | Performed by: NURSE PRACTITIONER

## 2021-09-06 PROCEDURE — 3008F BODY MASS INDEX DOCD: CPT | Mod: CPTII,S$GLB,, | Performed by: NURSE PRACTITIONER

## 2021-09-06 PROCEDURE — 1159F PR MEDICATION LIST DOCUMENTED IN MEDICAL RECORD: ICD-10-PCS | Mod: CPTII,S$GLB,, | Performed by: NURSE PRACTITIONER

## 2021-09-06 PROCEDURE — 3074F PR MOST RECENT SYSTOLIC BLOOD PRESSURE < 130 MM HG: ICD-10-PCS | Mod: CPTII,S$GLB,, | Performed by: NURSE PRACTITIONER

## 2021-09-06 PROCEDURE — 3008F PR BODY MASS INDEX (BMI) DOCUMENTED: ICD-10-PCS | Mod: CPTII,S$GLB,, | Performed by: NURSE PRACTITIONER

## 2021-09-06 PROCEDURE — 99213 PR OFFICE/OUTPT VISIT, EST, LEVL III, 20-29 MIN: ICD-10-PCS | Mod: S$GLB,,, | Performed by: NURSE PRACTITIONER

## 2021-09-06 PROCEDURE — 3074F SYST BP LT 130 MM HG: CPT | Mod: CPTII,S$GLB,, | Performed by: NURSE PRACTITIONER

## 2021-09-07 ENCOUNTER — PATIENT MESSAGE (OUTPATIENT)
Dept: ADMINISTRATIVE | Facility: CLINIC | Age: 51
End: 2021-09-07

## 2021-09-07 ENCOUNTER — PATIENT MESSAGE (OUTPATIENT)
Dept: ADMINISTRATIVE | Facility: OTHER | Age: 51
End: 2021-09-07

## 2021-09-07 ENCOUNTER — NURSE TRIAGE (OUTPATIENT)
Dept: ADMINISTRATIVE | Facility: CLINIC | Age: 51
End: 2021-09-07

## 2021-09-07 ENCOUNTER — INFUSION (OUTPATIENT)
Dept: INFECTIOUS DISEASES | Facility: HOSPITAL | Age: 51
End: 2021-09-07
Attending: INTERNAL MEDICINE
Payer: COMMERCIAL

## 2021-09-07 VITALS
DIASTOLIC BLOOD PRESSURE: 63 MMHG | HEART RATE: 67 BPM | OXYGEN SATURATION: 97 % | RESPIRATION RATE: 18 BRPM | SYSTOLIC BLOOD PRESSURE: 110 MMHG | TEMPERATURE: 98 F

## 2021-09-07 DIAGNOSIS — U07.1 COVID-19: Primary | ICD-10-CM

## 2021-09-07 PROCEDURE — M0243 CASIRIVI AND IMDEVI INFUSION: HCPCS | Performed by: INTERNAL MEDICINE

## 2021-09-07 PROCEDURE — 63600175 PHARM REV CODE 636 W HCPCS: Performed by: INTERNAL MEDICINE

## 2021-09-07 PROCEDURE — 25000003 PHARM REV CODE 250: Performed by: INTERNAL MEDICINE

## 2021-09-07 RX ORDER — SODIUM CHLORIDE 0.9 % (FLUSH) 0.9 %
10 SYRINGE (ML) INJECTION
Status: DISCONTINUED | OUTPATIENT
Start: 2021-09-07 | End: 2022-07-13

## 2021-09-07 RX ORDER — DIPHENHYDRAMINE HYDROCHLORIDE 50 MG/ML
25 INJECTION INTRAMUSCULAR; INTRAVENOUS ONCE AS NEEDED
Status: DISCONTINUED | OUTPATIENT
Start: 2021-09-07 | End: 2022-07-13

## 2021-09-07 RX ORDER — EPINEPHRINE 0.3 MG/.3ML
0.3 INJECTION SUBCUTANEOUS
Status: DISCONTINUED | OUTPATIENT
Start: 2021-09-07 | End: 2022-07-13

## 2021-09-07 RX ORDER — ALBUTEROL SULFATE 90 UG/1
2 AEROSOL, METERED RESPIRATORY (INHALATION)
Status: DISCONTINUED | OUTPATIENT
Start: 2021-09-07 | End: 2022-07-13

## 2021-09-07 RX ORDER — ONDANSETRON 4 MG/1
4 TABLET, ORALLY DISINTEGRATING ORAL ONCE AS NEEDED
Status: DISCONTINUED | OUTPATIENT
Start: 2021-09-07 | End: 2022-07-13

## 2021-09-07 RX ORDER — ACETAMINOPHEN 325 MG/1
650 TABLET ORAL ONCE AS NEEDED
Status: DISCONTINUED | OUTPATIENT
Start: 2021-09-07 | End: 2022-07-13

## 2021-09-07 RX ADMIN — SODIUM CHLORIDE: 0.9 INJECTION, SOLUTION INTRAVENOUS at 10:09

## 2021-09-07 RX ADMIN — CASIRIVIMAB AND IMDEVIMAB 600 MG: 600; 600 INJECTION, SOLUTION, CONCENTRATE INTRAVENOUS at 10:09

## 2021-09-08 ENCOUNTER — NURSE TRIAGE (OUTPATIENT)
Dept: ADMINISTRATIVE | Facility: CLINIC | Age: 51
End: 2021-09-08

## 2021-09-08 ENCOUNTER — PATIENT MESSAGE (OUTPATIENT)
Dept: ADMINISTRATIVE | Facility: OTHER | Age: 51
End: 2021-09-08

## 2021-09-08 ENCOUNTER — PATIENT MESSAGE (OUTPATIENT)
Dept: ADMINISTRATIVE | Facility: CLINIC | Age: 51
End: 2021-09-08

## 2021-09-09 ENCOUNTER — PATIENT MESSAGE (OUTPATIENT)
Dept: ADMINISTRATIVE | Facility: OTHER | Age: 51
End: 2021-09-09

## 2021-09-09 ENCOUNTER — OFFICE VISIT (OUTPATIENT)
Dept: FAMILY MEDICINE | Facility: CLINIC | Age: 51
End: 2021-09-09
Payer: COMMERCIAL

## 2021-09-09 DIAGNOSIS — F32.A ANXIETY AND DEPRESSION: Primary | ICD-10-CM

## 2021-09-09 DIAGNOSIS — F41.9 ANXIETY AND DEPRESSION: Primary | ICD-10-CM

## 2021-09-09 PROCEDURE — 1159F MED LIST DOCD IN RCRD: CPT | Mod: CPTII,,, | Performed by: NURSE PRACTITIONER

## 2021-09-09 PROCEDURE — 99203 OFFICE O/P NEW LOW 30 MIN: CPT | Mod: 95,,, | Performed by: NURSE PRACTITIONER

## 2021-09-09 PROCEDURE — 1160F PR REVIEW ALL MEDS BY PRESCRIBER/CLIN PHARMACIST DOCUMENTED: ICD-10-PCS | Mod: CPTII,,, | Performed by: NURSE PRACTITIONER

## 2021-09-09 PROCEDURE — 99203 PR OFFICE/OUTPT VISIT, NEW, LEVL III, 30-44 MIN: ICD-10-PCS | Mod: 95,,, | Performed by: NURSE PRACTITIONER

## 2021-09-09 PROCEDURE — 1160F RVW MEDS BY RX/DR IN RCRD: CPT | Mod: CPTII,,, | Performed by: NURSE PRACTITIONER

## 2021-09-09 PROCEDURE — 1159F PR MEDICATION LIST DOCUMENTED IN MEDICAL RECORD: ICD-10-PCS | Mod: CPTII,,, | Performed by: NURSE PRACTITIONER

## 2021-09-09 RX ORDER — ESCITALOPRAM OXALATE 10 MG/1
10 TABLET ORAL DAILY
Qty: 30 TABLET | Refills: 0 | Status: SHIPPED | OUTPATIENT
Start: 2021-09-09 | End: 2021-10-27 | Stop reason: SDUPTHER

## 2021-09-09 RX ORDER — BUSPIRONE HYDROCHLORIDE 5 MG/1
5 TABLET ORAL 3 TIMES DAILY PRN
Qty: 90 TABLET | Refills: 0 | Status: SHIPPED | OUTPATIENT
Start: 2021-09-09 | End: 2021-10-27 | Stop reason: SDUPTHER

## 2021-09-10 ENCOUNTER — PATIENT MESSAGE (OUTPATIENT)
Dept: ADMINISTRATIVE | Facility: OTHER | Age: 51
End: 2021-09-10

## 2021-09-11 ENCOUNTER — PATIENT MESSAGE (OUTPATIENT)
Dept: ADMINISTRATIVE | Facility: OTHER | Age: 51
End: 2021-09-11

## 2021-09-12 ENCOUNTER — PATIENT MESSAGE (OUTPATIENT)
Dept: ADMINISTRATIVE | Facility: OTHER | Age: 51
End: 2021-09-12

## 2021-09-12 ENCOUNTER — PATIENT MESSAGE (OUTPATIENT)
Dept: ADMINISTRATIVE | Facility: CLINIC | Age: 51
End: 2021-09-12

## 2021-09-13 ENCOUNTER — PATIENT MESSAGE (OUTPATIENT)
Dept: ADMINISTRATIVE | Facility: OTHER | Age: 51
End: 2021-09-13

## 2021-09-14 ENCOUNTER — PATIENT MESSAGE (OUTPATIENT)
Dept: ADMINISTRATIVE | Facility: OTHER | Age: 51
End: 2021-09-14

## 2021-09-15 ENCOUNTER — PATIENT MESSAGE (OUTPATIENT)
Dept: ADMINISTRATIVE | Facility: OTHER | Age: 51
End: 2021-09-15

## 2021-09-16 ENCOUNTER — PATIENT MESSAGE (OUTPATIENT)
Dept: ADMINISTRATIVE | Facility: OTHER | Age: 51
End: 2021-09-16

## 2021-09-17 ENCOUNTER — PATIENT MESSAGE (OUTPATIENT)
Dept: ADMINISTRATIVE | Facility: CLINIC | Age: 51
End: 2021-09-17

## 2021-09-17 ENCOUNTER — PATIENT MESSAGE (OUTPATIENT)
Dept: ADMINISTRATIVE | Facility: OTHER | Age: 51
End: 2021-09-17

## 2021-09-18 ENCOUNTER — PATIENT MESSAGE (OUTPATIENT)
Dept: ADMINISTRATIVE | Facility: OTHER | Age: 51
End: 2021-09-18

## 2021-09-19 ENCOUNTER — PATIENT MESSAGE (OUTPATIENT)
Dept: ADMINISTRATIVE | Facility: OTHER | Age: 51
End: 2021-09-19

## 2021-09-20 ENCOUNTER — PATIENT MESSAGE (OUTPATIENT)
Dept: ADMINISTRATIVE | Facility: OTHER | Age: 51
End: 2021-09-20

## 2021-10-06 ENCOUNTER — LAB VISIT (OUTPATIENT)
Dept: URGENT CARE | Facility: CLINIC | Age: 51
End: 2021-10-06
Payer: COMMERCIAL

## 2021-10-06 ENCOUNTER — TELEPHONE (OUTPATIENT)
Dept: FAMILY MEDICINE | Facility: CLINIC | Age: 51
End: 2021-10-06

## 2021-10-06 ENCOUNTER — OFFICE VISIT (OUTPATIENT)
Dept: URGENT CARE | Facility: CLINIC | Age: 51
End: 2021-10-06
Payer: COMMERCIAL

## 2021-10-06 VITALS
BODY MASS INDEX: 30.36 KG/M2 | HEIGHT: 69 IN | WEIGHT: 205 LBS | HEART RATE: 88 BPM | DIASTOLIC BLOOD PRESSURE: 87 MMHG | SYSTOLIC BLOOD PRESSURE: 130 MMHG | TEMPERATURE: 98 F | OXYGEN SATURATION: 95 % | RESPIRATION RATE: 18 BRPM

## 2021-10-06 DIAGNOSIS — Z20.822 EXPOSURE TO COVID-19 VIRUS: ICD-10-CM

## 2021-10-06 DIAGNOSIS — R39.15 URINARY URGENCY: ICD-10-CM

## 2021-10-06 DIAGNOSIS — Z20.822 ENCOUNTER FOR LABORATORY TESTING FOR COVID-19 VIRUS: Primary | ICD-10-CM

## 2021-10-06 DIAGNOSIS — U07.1 COVID-19 VIRUS DETECTED: Primary | ICD-10-CM

## 2021-10-06 LAB
BILIRUB UR QL STRIP: NEGATIVE
CTP QC/QA: YES
GLUCOSE UR QL STRIP: NEGATIVE
KETONES UR QL STRIP: NEGATIVE
LEUKOCYTE ESTERASE UR QL STRIP: NEGATIVE
PH, POC UA: 5
POC BLOOD, URINE: NEGATIVE
POC NITRATES, URINE: NEGATIVE
PROT UR QL STRIP: NEGATIVE
SARS-COV-2 RDRP RESP QL NAA+PROBE: POSITIVE
SP GR UR STRIP: 1.02 (ref 1–1.03)
UROBILINOGEN UR STRIP-ACNC: NORMAL (ref 0.1–1.1)

## 2021-10-06 PROCEDURE — 99213 OFFICE O/P EST LOW 20 MIN: CPT | Mod: 25,S$GLB,, | Performed by: NURSE PRACTITIONER

## 2021-10-06 PROCEDURE — 81003 URINALYSIS AUTO W/O SCOPE: CPT | Mod: QW,S$GLB,, | Performed by: NURSE PRACTITIONER

## 2021-10-06 PROCEDURE — 99213 PR OFFICE/OUTPT VISIT, EST, LEVL III, 20-29 MIN: ICD-10-PCS | Mod: 25,S$GLB,, | Performed by: NURSE PRACTITIONER

## 2021-10-06 PROCEDURE — 3079F PR MOST RECENT DIASTOLIC BLOOD PRESSURE 80-89 MM HG: ICD-10-PCS | Mod: CPTII,S$GLB,, | Performed by: NURSE PRACTITIONER

## 2021-10-06 PROCEDURE — U0002 COVID-19 LAB TEST NON-CDC: HCPCS | Mod: QW,S$GLB,, | Performed by: EMERGENCY MEDICINE

## 2021-10-06 PROCEDURE — 1159F MED LIST DOCD IN RCRD: CPT | Mod: CPTII,S$GLB,, | Performed by: NURSE PRACTITIONER

## 2021-10-06 PROCEDURE — U0005 INFEC AGEN DETEC AMPLI PROBE: HCPCS | Performed by: EMERGENCY MEDICINE

## 2021-10-06 PROCEDURE — 3008F BODY MASS INDEX DOCD: CPT | Mod: CPTII,S$GLB,, | Performed by: NURSE PRACTITIONER

## 2021-10-06 PROCEDURE — 1159F PR MEDICATION LIST DOCUMENTED IN MEDICAL RECORD: ICD-10-PCS | Mod: CPTII,S$GLB,, | Performed by: NURSE PRACTITIONER

## 2021-10-06 PROCEDURE — 3008F PR BODY MASS INDEX (BMI) DOCUMENTED: ICD-10-PCS | Mod: CPTII,S$GLB,, | Performed by: NURSE PRACTITIONER

## 2021-10-06 PROCEDURE — 3079F DIAST BP 80-89 MM HG: CPT | Mod: CPTII,S$GLB,, | Performed by: NURSE PRACTITIONER

## 2021-10-06 PROCEDURE — 3075F SYST BP GE 130 - 139MM HG: CPT | Mod: CPTII,S$GLB,, | Performed by: NURSE PRACTITIONER

## 2021-10-06 PROCEDURE — U0002: ICD-10-PCS | Mod: QW,S$GLB,, | Performed by: EMERGENCY MEDICINE

## 2021-10-06 PROCEDURE — U0003 INFECTIOUS AGENT DETECTION BY NUCLEIC ACID (DNA OR RNA); SEVERE ACUTE RESPIRATORY SYNDROME CORONAVIRUS 2 (SARS-COV-2) (CORONAVIRUS DISEASE [COVID-19]), AMPLIFIED PROBE TECHNIQUE, MAKING USE OF HIGH THROUGHPUT TECHNOLOGIES AS DESCRIBED BY CMS-2020-01-R: HCPCS | Performed by: EMERGENCY MEDICINE

## 2021-10-06 PROCEDURE — 3075F PR MOST RECENT SYSTOLIC BLOOD PRESS GE 130-139MM HG: ICD-10-PCS | Mod: CPTII,S$GLB,, | Performed by: NURSE PRACTITIONER

## 2021-10-06 PROCEDURE — 81003 POCT URINALYSIS, DIPSTICK, AUTOMATED, W/O SCOPE: ICD-10-PCS | Mod: QW,S$GLB,, | Performed by: NURSE PRACTITIONER

## 2021-10-06 RX ORDER — NITROFURANTOIN 25; 75 MG/1; MG/1
100 CAPSULE ORAL 2 TIMES DAILY
Qty: 14 CAPSULE | Refills: 0 | Status: SHIPPED | OUTPATIENT
Start: 2021-10-06 | End: 2021-10-11 | Stop reason: ALTCHOICE

## 2021-10-07 ENCOUNTER — TELEPHONE (OUTPATIENT)
Dept: URGENT CARE | Facility: CLINIC | Age: 51
End: 2021-10-07

## 2021-10-07 LAB
SARS-COV-2 RNA RESP QL NAA+PROBE: NOT DETECTED
SARS-COV-2- CYCLE NUMBER: NORMAL

## 2021-10-10 LAB
BACTERIA UR CULT: NO GROWTH
BACTERIA UR CULT: NORMAL

## 2021-10-11 ENCOUNTER — OFFICE VISIT (OUTPATIENT)
Dept: FAMILY MEDICINE | Facility: CLINIC | Age: 51
End: 2021-10-11
Payer: COMMERCIAL

## 2021-10-11 VITALS
OXYGEN SATURATION: 97 % | TEMPERATURE: 99 F | HEART RATE: 80 BPM | HEIGHT: 69 IN | SYSTOLIC BLOOD PRESSURE: 118 MMHG | BODY MASS INDEX: 30.66 KG/M2 | DIASTOLIC BLOOD PRESSURE: 72 MMHG | WEIGHT: 207 LBS

## 2021-10-11 DIAGNOSIS — F32.A ANXIETY AND DEPRESSION: Primary | ICD-10-CM

## 2021-10-11 DIAGNOSIS — F41.9 ANXIETY AND DEPRESSION: Primary | ICD-10-CM

## 2021-10-11 PROCEDURE — 99999 PR PBB SHADOW E&M-EST. PATIENT-LVL IV: ICD-10-PCS | Mod: PBBFAC,,,

## 2021-10-11 PROCEDURE — 3074F SYST BP LT 130 MM HG: CPT | Mod: CPTII,S$GLB,,

## 2021-10-11 PROCEDURE — 3008F PR BODY MASS INDEX (BMI) DOCUMENTED: ICD-10-PCS | Mod: CPTII,S$GLB,,

## 2021-10-11 PROCEDURE — 3074F PR MOST RECENT SYSTOLIC BLOOD PRESSURE < 130 MM HG: ICD-10-PCS | Mod: CPTII,S$GLB,,

## 2021-10-11 PROCEDURE — 99999 PR PBB SHADOW E&M-EST. PATIENT-LVL IV: CPT | Mod: PBBFAC,,,

## 2021-10-11 PROCEDURE — 1159F PR MEDICATION LIST DOCUMENTED IN MEDICAL RECORD: ICD-10-PCS | Mod: CPTII,S$GLB,,

## 2021-10-11 PROCEDURE — 99213 OFFICE O/P EST LOW 20 MIN: CPT | Mod: S$GLB,,,

## 2021-10-11 PROCEDURE — 1160F PR REVIEW ALL MEDS BY PRESCRIBER/CLIN PHARMACIST DOCUMENTED: ICD-10-PCS | Mod: CPTII,S$GLB,,

## 2021-10-11 PROCEDURE — 1159F MED LIST DOCD IN RCRD: CPT | Mod: CPTII,S$GLB,,

## 2021-10-11 PROCEDURE — 3078F DIAST BP <80 MM HG: CPT | Mod: CPTII,S$GLB,,

## 2021-10-11 PROCEDURE — 99213 PR OFFICE/OUTPT VISIT, EST, LEVL III, 20-29 MIN: ICD-10-PCS | Mod: S$GLB,,,

## 2021-10-11 PROCEDURE — 3008F BODY MASS INDEX DOCD: CPT | Mod: CPTII,S$GLB,,

## 2021-10-11 PROCEDURE — 3078F PR MOST RECENT DIASTOLIC BLOOD PRESSURE < 80 MM HG: ICD-10-PCS | Mod: CPTII,S$GLB,,

## 2021-10-11 PROCEDURE — 1160F RVW MEDS BY RX/DR IN RCRD: CPT | Mod: CPTII,S$GLB,,

## 2021-10-12 ENCOUNTER — TELEPHONE (OUTPATIENT)
Dept: URGENT CARE | Facility: CLINIC | Age: 51
End: 2021-10-12

## 2021-10-20 ENCOUNTER — OFFICE VISIT (OUTPATIENT)
Dept: URGENT CARE | Facility: CLINIC | Age: 51
End: 2021-10-20
Payer: COMMERCIAL

## 2021-10-20 VITALS
RESPIRATION RATE: 16 BRPM | HEART RATE: 79 BPM | TEMPERATURE: 98 F | HEIGHT: 69 IN | DIASTOLIC BLOOD PRESSURE: 74 MMHG | OXYGEN SATURATION: 97 % | SYSTOLIC BLOOD PRESSURE: 109 MMHG | BODY MASS INDEX: 30.07 KG/M2 | WEIGHT: 203 LBS

## 2021-10-20 DIAGNOSIS — Z20.822 COVID-19 VIRUS NOT DETECTED: ICD-10-CM

## 2021-10-20 DIAGNOSIS — Z20.822 ENCOUNTER FOR LABORATORY TESTING FOR COVID-19 VIRUS: Primary | ICD-10-CM

## 2021-10-20 LAB
CTP QC/QA: YES
SARS-COV-2 RDRP RESP QL NAA+PROBE: NEGATIVE

## 2021-10-20 PROCEDURE — 99213 PR OFFICE/OUTPT VISIT, EST, LEVL III, 20-29 MIN: ICD-10-PCS | Mod: S$GLB,,, | Performed by: EMERGENCY MEDICINE

## 2021-10-20 PROCEDURE — 3078F PR MOST RECENT DIASTOLIC BLOOD PRESSURE < 80 MM HG: ICD-10-PCS | Mod: CPTII,S$GLB,, | Performed by: EMERGENCY MEDICINE

## 2021-10-20 PROCEDURE — 3008F PR BODY MASS INDEX (BMI) DOCUMENTED: ICD-10-PCS | Mod: CPTII,S$GLB,, | Performed by: EMERGENCY MEDICINE

## 2021-10-20 PROCEDURE — 1159F PR MEDICATION LIST DOCUMENTED IN MEDICAL RECORD: ICD-10-PCS | Mod: CPTII,S$GLB,, | Performed by: EMERGENCY MEDICINE

## 2021-10-20 PROCEDURE — U0002: ICD-10-PCS | Mod: QW,S$GLB,, | Performed by: EMERGENCY MEDICINE

## 2021-10-20 PROCEDURE — 3074F PR MOST RECENT SYSTOLIC BLOOD PRESSURE < 130 MM HG: ICD-10-PCS | Mod: CPTII,S$GLB,, | Performed by: EMERGENCY MEDICINE

## 2021-10-20 PROCEDURE — 99213 OFFICE O/P EST LOW 20 MIN: CPT | Mod: S$GLB,,, | Performed by: EMERGENCY MEDICINE

## 2021-10-20 PROCEDURE — 1159F MED LIST DOCD IN RCRD: CPT | Mod: CPTII,S$GLB,, | Performed by: EMERGENCY MEDICINE

## 2021-10-20 PROCEDURE — 3074F SYST BP LT 130 MM HG: CPT | Mod: CPTII,S$GLB,, | Performed by: EMERGENCY MEDICINE

## 2021-10-20 PROCEDURE — 3078F DIAST BP <80 MM HG: CPT | Mod: CPTII,S$GLB,, | Performed by: EMERGENCY MEDICINE

## 2021-10-20 PROCEDURE — 1160F PR REVIEW ALL MEDS BY PRESCRIBER/CLIN PHARMACIST DOCUMENTED: ICD-10-PCS | Mod: CPTII,S$GLB,, | Performed by: EMERGENCY MEDICINE

## 2021-10-20 PROCEDURE — 1160F RVW MEDS BY RX/DR IN RCRD: CPT | Mod: CPTII,S$GLB,, | Performed by: EMERGENCY MEDICINE

## 2021-10-20 PROCEDURE — 3008F BODY MASS INDEX DOCD: CPT | Mod: CPTII,S$GLB,, | Performed by: EMERGENCY MEDICINE

## 2021-10-20 PROCEDURE — U0002 COVID-19 LAB TEST NON-CDC: HCPCS | Mod: QW,S$GLB,, | Performed by: EMERGENCY MEDICINE

## 2021-10-27 DIAGNOSIS — F41.9 ANXIETY AND DEPRESSION: ICD-10-CM

## 2021-10-27 DIAGNOSIS — F32.A ANXIETY AND DEPRESSION: ICD-10-CM

## 2021-10-27 RX ORDER — BUSPIRONE HYDROCHLORIDE 5 MG/1
5 TABLET ORAL 3 TIMES DAILY PRN
Qty: 270 TABLET | Refills: 0 | Status: SHIPPED | OUTPATIENT
Start: 2021-10-27 | End: 2022-01-13

## 2021-10-27 RX ORDER — ESCITALOPRAM OXALATE 10 MG/1
10 TABLET ORAL DAILY
Qty: 90 TABLET | Refills: 0 | Status: SHIPPED | OUTPATIENT
Start: 2021-10-27 | End: 2022-02-11

## 2021-11-01 ENCOUNTER — TELEPHONE (OUTPATIENT)
Dept: FAMILY MEDICINE | Facility: CLINIC | Age: 51
End: 2021-11-01
Payer: COMMERCIAL

## 2021-11-11 ENCOUNTER — OFFICE VISIT (OUTPATIENT)
Dept: URGENT CARE | Facility: CLINIC | Age: 51
End: 2021-11-11
Payer: COMMERCIAL

## 2021-11-11 VITALS
OXYGEN SATURATION: 96 % | SYSTOLIC BLOOD PRESSURE: 112 MMHG | BODY MASS INDEX: 30.07 KG/M2 | RESPIRATION RATE: 14 BRPM | TEMPERATURE: 98 F | HEIGHT: 69 IN | WEIGHT: 203 LBS | HEART RATE: 83 BPM | DIASTOLIC BLOOD PRESSURE: 76 MMHG

## 2021-11-11 DIAGNOSIS — Z20.822 ENCOUNTER FOR LABORATORY TESTING FOR COVID-19 VIRUS: Primary | ICD-10-CM

## 2021-11-11 DIAGNOSIS — Z20.822 COVID-19 VIRUS NOT DETECTED: ICD-10-CM

## 2021-11-11 LAB
CTP QC/QA: YES
SARS-COV-2 RDRP RESP QL NAA+PROBE: NEGATIVE

## 2021-11-11 PROCEDURE — 3078F PR MOST RECENT DIASTOLIC BLOOD PRESSURE < 80 MM HG: ICD-10-PCS | Mod: CPTII,S$GLB,, | Performed by: NURSE PRACTITIONER

## 2021-11-11 PROCEDURE — 3074F PR MOST RECENT SYSTOLIC BLOOD PRESSURE < 130 MM HG: ICD-10-PCS | Mod: CPTII,S$GLB,, | Performed by: NURSE PRACTITIONER

## 2021-11-11 PROCEDURE — 3078F DIAST BP <80 MM HG: CPT | Mod: CPTII,S$GLB,, | Performed by: NURSE PRACTITIONER

## 2021-11-11 PROCEDURE — 3008F BODY MASS INDEX DOCD: CPT | Mod: CPTII,S$GLB,, | Performed by: NURSE PRACTITIONER

## 2021-11-11 PROCEDURE — 3074F SYST BP LT 130 MM HG: CPT | Mod: CPTII,S$GLB,, | Performed by: NURSE PRACTITIONER

## 2021-11-11 PROCEDURE — 3008F PR BODY MASS INDEX (BMI) DOCUMENTED: ICD-10-PCS | Mod: CPTII,S$GLB,, | Performed by: NURSE PRACTITIONER

## 2021-11-11 PROCEDURE — 1160F PR REVIEW ALL MEDS BY PRESCRIBER/CLIN PHARMACIST DOCUMENTED: ICD-10-PCS | Mod: CPTII,S$GLB,, | Performed by: NURSE PRACTITIONER

## 2021-11-11 PROCEDURE — 99212 PR OFFICE/OUTPT VISIT, EST, LEVL II, 10-19 MIN: ICD-10-PCS | Mod: S$GLB,,, | Performed by: NURSE PRACTITIONER

## 2021-11-11 PROCEDURE — 99212 OFFICE O/P EST SF 10 MIN: CPT | Mod: S$GLB,,, | Performed by: NURSE PRACTITIONER

## 2021-11-11 PROCEDURE — U0002: ICD-10-PCS | Mod: QW,S$GLB,, | Performed by: NURSE PRACTITIONER

## 2021-11-11 PROCEDURE — 1160F RVW MEDS BY RX/DR IN RCRD: CPT | Mod: CPTII,S$GLB,, | Performed by: NURSE PRACTITIONER

## 2021-11-11 PROCEDURE — 1159F MED LIST DOCD IN RCRD: CPT | Mod: CPTII,S$GLB,, | Performed by: NURSE PRACTITIONER

## 2021-11-11 PROCEDURE — U0002 COVID-19 LAB TEST NON-CDC: HCPCS | Mod: QW,S$GLB,, | Performed by: NURSE PRACTITIONER

## 2021-11-11 PROCEDURE — 1159F PR MEDICATION LIST DOCUMENTED IN MEDICAL RECORD: ICD-10-PCS | Mod: CPTII,S$GLB,, | Performed by: NURSE PRACTITIONER

## 2021-12-27 ENCOUNTER — HOSPITAL ENCOUNTER (OUTPATIENT)
Dept: RADIOLOGY | Facility: HOSPITAL | Age: 51
Discharge: HOME | End: 2021-12-27
Attending: STUDENT IN AN ORGANIZED HEALTH CARE EDUCATION/TRAINING PROGRAM
Payer: COMMERCIAL

## 2021-12-27 DIAGNOSIS — N20.0 RIGHT RENAL STONE: ICD-10-CM

## 2021-12-27 PROCEDURE — 74018 RADEX ABDOMEN 1 VIEW: CPT | Mod: TC,FY

## 2021-12-27 PROCEDURE — 74018 RADEX ABDOMEN 1 VIEW: CPT | Mod: 26,,, | Performed by: RADIOLOGY

## 2021-12-27 PROCEDURE — 76770 US EXAM ABDO BACK WALL COMP: CPT | Mod: TC

## 2021-12-27 PROCEDURE — 74018 XR ABDOMEN AP 1 VIEW: ICD-10-PCS | Mod: 26,,, | Performed by: RADIOLOGY

## 2021-12-27 PROCEDURE — 76770 US RETROPERITONEAL COMPLETE: ICD-10-PCS | Mod: 26,,, | Performed by: RADIOLOGY

## 2021-12-27 PROCEDURE — 76770 US EXAM ABDO BACK WALL COMP: CPT | Mod: 26,,, | Performed by: RADIOLOGY

## 2022-01-11 NOTE — PROGRESS NOTES
[facility] Note    PCP: Primary Doctor No    Chief Complaint: kidney stones     SUBJECTIVE:       History of Present Illness:  Tereza Weinstein is a 52 y.o. female who presents to clinic for right renal stone. She is Established  to our clinic.     US and KUB showed no stone or hydro.   Has been on HCTZ 25 mg. Tolerating well.   No flank pain. No hematuria or dysuria.   Has been drinking about 3 16 oz obrien a day.     5/10/21  Patient presents for virtual visit to discuss 24 hour urine results.   Low urine volume (1.78), hypercalciuria (362), hyperuricosuria.    She is doing well without complaints today.   She does not take any vitamins or supplements. She does eat a lot of animal protein. Claims her sodium intake is not excessive.     3/24/21  Patient is s/p right PCNL on 12/11/20. Her stent was removed 12/23.   She underwent KUB and renal US which do not show residual stone or hydronephrosis.     She is doing well post op. She has no hematuria. She has occasional back pain mostly at night and at times is bilateral.     11/30/20  Patient presented to the ED on 11/23 with right flank pain and was discovered to have a 2 cm right renal pelvis stone with obstruction of the UPJ. She underwent stent placement the next day and was discharged home.    Patient has been having significant discomfort from the stent however otherwise doing well. No fevers. Complaining of flank and bladder discomfort.     Working on insurance, states she will have insurance starting at midnight tonight.     Last urine culture: no growth (11/23/20)    Lab Results   Component Value Date    CREATININE 0.8 05/17/2021     Current smoker.    Past medical, family, and social history reviewed as documented in chart with pertinent positive medical, family, and social history detailed in HPI.    Review of patient's allergies indicates:   Allergen Reactions    Penicillins      I was young unsure of reaction         Past  "Medical History:   Diagnosis Date    Kidney stone     PONV (postoperative nausea and vomiting)      Past Surgical History:   Procedure Laterality Date    ANTEGRADE NEPHROSTOGRAPHY Right 12/11/2020    Procedure: NEPHROSTOGRAM, ANTEGRADE;  Surgeon: Yuko Hawkins MD;  Location: [facility] OR;  Service: Urology;  Laterality: Right;    CYSTOSCOPY W/ URETERAL STENT PLACEMENT Right 11/24/2020    Procedure: CYSTOSCOPY, WITH URETERAL STENT INSERTION;  Surgeon: Yuko Hawkins MD;  Location: [facility] OR;  Service: Urology;  Laterality: Right;    CYSTOSCOPY W/ URETERAL STENT REMOVAL Right 12/23/2020    Procedure: CYSTOSCOPY, WITH URETERAL STENT REMOVAL;  Surgeon: Yuko Hawkins MD;  Location[facility] OR;  Service: Urology;  Laterality: Right;    HYSTERECTOMY      PERCUTANEOUS NEPHROLITHOTRIPSY Right 12/11/2020    Procedure: NEPHROLITHOTRIPSY, PERCUTANEOUS;  Surgeon: Yuko Hawkins MD;  Location: [facility] OR;  Service: Urology;  Laterality: Right;    PERCUTANEOUS NEPHROSTOMY Right 12/10/2020    Procedure: Creation, Nephrostomy, Percutaneous;  Surgeon: [facility];  Location: [facility];  Service: General;  Laterality: Right;     No family history on file.  Social History     Tobacco Use    Smoking status: Current Some Day Smoker     Packs/day: 0.50     Types: Cigarettes    Smokeless tobacco: Never Used   Substance Use Topics    Alcohol use: No    Drug use: No        Review of Systems   Genitourinary: Negative for difficulty urinating, dysuria, flank pain, hematuria, nocturia and urgency.       OBJECTIVE:     Anticoagulation: no    Estimated body mass index is 29.98 kg/m² as calculated from the following:    Height as of this encounter: 5' 9" (1.753 m).    Weight as of this encounter: 92.1 kg (203 lb).    Vital Signs (Most Recent)      Physical Exam  Constitutional:       General: She is not in acute distress.     Appearance: She is not ill-appearing.   HENT:      Head: Normocephalic and atraumatic. "   Eyes:      General: No scleral icterus.  Skin:     Coloration: Skin is not jaundiced.   Neurological:      General: No focal deficit present.      Mental Status: She is alert and oriented to person, place, and time.   Psychiatric:         Mood and Affect: Mood normal.         Behavior: Behavior normal.         Thought Content: Thought content normal.         BMP  Lab Results   Component Value Date     05/17/2021    K 3.7 05/17/2021    CL 97 05/17/2021    CO2 30 (H) 05/17/2021    BUN 16 05/17/2021    CREATININE 0.8 05/17/2021    CALCIUM 10.3 05/17/2021    ANIONGAP 10 05/17/2021    ESTGFRAFRICA >60 05/17/2021    EGFRNONAA >60 05/17/2021       Lab Results   Component Value Date    WBC 14.81 (H) 12/12/2020    HGB 12.0 12/12/2020    HCT 38.3 12/12/2020     (H) 12/12/2020     12/12/2020       Imaging:  CTRSS 11/23/20:  There is a 22 mm right renal calculus in the renal pelvis centrally  and perhaps lodged at the ureteropelvic junction, with mild  hydronephrosis. There is no additional 2 mm nonobstructing right lower  pole renal calculus, with right perinephric and proximal right  periureteral stranding reflecting edema. There are no left renal or  ureteral calculi, with no left-sided hydroureteronephrosis. Hypodense  left upper pole renal lesion is nonspecific but suggestive of a cyst.    ASSESSMENT     1. Nephrolithiasis    2. Right renal stone    3. Nicotine use disorder        PLAN:     - Check BMP  - Will repeat 24 hour urine to evaluate effect of medication   - We again discussed importance of dietary modification. Drink 2-3 L of water day, can add lemon. Limit sodium in diet. Limit amount of animal protein in diet.   - US and KUB show no stones.   - Follow up in 1 year or sooner or needed       Yuko Hawkisn MD

## 2022-01-13 ENCOUNTER — OFFICE VISIT (OUTPATIENT)
Dept: UROLOGY | Facility: CLINIC | Age: 52
End: 2022-01-13
Payer: COMMERCIAL

## 2022-01-13 ENCOUNTER — OFFICE VISIT (OUTPATIENT)
Dept: FAMILY MEDICINE | Facility: CLINIC | Age: 52
End: 2022-01-13
Payer: COMMERCIAL

## 2022-01-13 VITALS
SYSTOLIC BLOOD PRESSURE: 139 MMHG | HEIGHT: 69 IN | TEMPERATURE: 99 F | BODY MASS INDEX: 30.21 KG/M2 | OXYGEN SATURATION: 97 % | RESPIRATION RATE: 16 BRPM | WEIGHT: 203.94 LBS | DIASTOLIC BLOOD PRESSURE: 86 MMHG | HEART RATE: 87 BPM

## 2022-01-13 VITALS
HEIGHT: 69 IN | DIASTOLIC BLOOD PRESSURE: 73 MMHG | BODY MASS INDEX: 30.07 KG/M2 | SYSTOLIC BLOOD PRESSURE: 121 MMHG | WEIGHT: 203 LBS | RESPIRATION RATE: 18 BRPM | HEART RATE: 96 BPM

## 2022-01-13 DIAGNOSIS — F17.200 NICOTINE USE DISORDER: ICD-10-CM

## 2022-01-13 DIAGNOSIS — F32.A ANXIETY AND DEPRESSION: ICD-10-CM

## 2022-01-13 DIAGNOSIS — F43.21 GRIEF: Primary | ICD-10-CM

## 2022-01-13 DIAGNOSIS — N20.0 NEPHROLITHIASIS: Primary | ICD-10-CM

## 2022-01-13 DIAGNOSIS — N20.0 RIGHT RENAL STONE: ICD-10-CM

## 2022-01-13 DIAGNOSIS — F41.9 ANXIETY AND DEPRESSION: ICD-10-CM

## 2022-01-13 DIAGNOSIS — G47.00 INSOMNIA, UNSPECIFIED TYPE: ICD-10-CM

## 2022-01-13 PROCEDURE — 3078F PR MOST RECENT DIASTOLIC BLOOD PRESSURE < 80 MM HG: ICD-10-PCS | Mod: CPTII,S$GLB,, | Performed by: STUDENT IN AN ORGANIZED HEALTH CARE EDUCATION/TRAINING PROGRAM

## 2022-01-13 PROCEDURE — 99999 PR PBB SHADOW E&M-EST. PATIENT-LVL V: CPT | Mod: PBBFAC,,,

## 2022-01-13 PROCEDURE — 99999 PR PBB SHADOW E&M-EST. PATIENT-LVL III: ICD-10-PCS | Mod: PBBFAC,,, | Performed by: STUDENT IN AN ORGANIZED HEALTH CARE EDUCATION/TRAINING PROGRAM

## 2022-01-13 PROCEDURE — 3074F PR MOST RECENT SYSTOLIC BLOOD PRESSURE < 130 MM HG: ICD-10-PCS | Mod: CPTII,S$GLB,, | Performed by: STUDENT IN AN ORGANIZED HEALTH CARE EDUCATION/TRAINING PROGRAM

## 2022-01-13 PROCEDURE — 1160F PR REVIEW ALL MEDS BY PRESCRIBER/CLIN PHARMACIST DOCUMENTED: ICD-10-PCS | Mod: CPTII,S$GLB,,

## 2022-01-13 PROCEDURE — 3008F BODY MASS INDEX DOCD: CPT | Mod: CPTII,S$GLB,, | Performed by: STUDENT IN AN ORGANIZED HEALTH CARE EDUCATION/TRAINING PROGRAM

## 2022-01-13 PROCEDURE — 1159F MED LIST DOCD IN RCRD: CPT | Mod: CPTII,S$GLB,,

## 2022-01-13 PROCEDURE — 3079F DIAST BP 80-89 MM HG: CPT | Mod: CPTII,S$GLB,,

## 2022-01-13 PROCEDURE — 3079F PR MOST RECENT DIASTOLIC BLOOD PRESSURE 80-89 MM HG: ICD-10-PCS | Mod: CPTII,S$GLB,,

## 2022-01-13 PROCEDURE — 1160F RVW MEDS BY RX/DR IN RCRD: CPT | Mod: CPTII,S$GLB,,

## 2022-01-13 PROCEDURE — 1159F PR MEDICATION LIST DOCUMENTED IN MEDICAL RECORD: ICD-10-PCS | Mod: CPTII,S$GLB,,

## 2022-01-13 PROCEDURE — 99213 OFFICE O/P EST LOW 20 MIN: CPT | Mod: S$GLB,,, | Performed by: STUDENT IN AN ORGANIZED HEALTH CARE EDUCATION/TRAINING PROGRAM

## 2022-01-13 PROCEDURE — 99999 PR PBB SHADOW E&M-EST. PATIENT-LVL V: ICD-10-PCS | Mod: PBBFAC,,,

## 2022-01-13 PROCEDURE — 3075F PR MOST RECENT SYSTOLIC BLOOD PRESS GE 130-139MM HG: ICD-10-PCS | Mod: CPTII,S$GLB,,

## 2022-01-13 PROCEDURE — 99999 PR PBB SHADOW E&M-EST. PATIENT-LVL III: CPT | Mod: PBBFAC,,, | Performed by: STUDENT IN AN ORGANIZED HEALTH CARE EDUCATION/TRAINING PROGRAM

## 2022-01-13 PROCEDURE — 3008F PR BODY MASS INDEX (BMI) DOCUMENTED: ICD-10-PCS | Mod: CPTII,S$GLB,,

## 2022-01-13 PROCEDURE — 99214 OFFICE O/P EST MOD 30 MIN: CPT | Mod: S$GLB,,,

## 2022-01-13 PROCEDURE — 99214 PR OFFICE/OUTPT VISIT, EST, LEVL IV, 30-39 MIN: ICD-10-PCS | Mod: S$GLB,,,

## 2022-01-13 PROCEDURE — 3074F SYST BP LT 130 MM HG: CPT | Mod: CPTII,S$GLB,, | Performed by: STUDENT IN AN ORGANIZED HEALTH CARE EDUCATION/TRAINING PROGRAM

## 2022-01-13 PROCEDURE — 1159F MED LIST DOCD IN RCRD: CPT | Mod: CPTII,S$GLB,, | Performed by: STUDENT IN AN ORGANIZED HEALTH CARE EDUCATION/TRAINING PROGRAM

## 2022-01-13 PROCEDURE — 1159F PR MEDICATION LIST DOCUMENTED IN MEDICAL RECORD: ICD-10-PCS | Mod: CPTII,S$GLB,, | Performed by: STUDENT IN AN ORGANIZED HEALTH CARE EDUCATION/TRAINING PROGRAM

## 2022-01-13 PROCEDURE — 3008F PR BODY MASS INDEX (BMI) DOCUMENTED: ICD-10-PCS | Mod: CPTII,S$GLB,, | Performed by: STUDENT IN AN ORGANIZED HEALTH CARE EDUCATION/TRAINING PROGRAM

## 2022-01-13 PROCEDURE — 3078F DIAST BP <80 MM HG: CPT | Mod: CPTII,S$GLB,, | Performed by: STUDENT IN AN ORGANIZED HEALTH CARE EDUCATION/TRAINING PROGRAM

## 2022-01-13 PROCEDURE — 3075F SYST BP GE 130 - 139MM HG: CPT | Mod: CPTII,S$GLB,,

## 2022-01-13 PROCEDURE — 99213 PR OFFICE/OUTPT VISIT, EST, LEVL III, 20-29 MIN: ICD-10-PCS | Mod: S$GLB,,, | Performed by: STUDENT IN AN ORGANIZED HEALTH CARE EDUCATION/TRAINING PROGRAM

## 2022-01-13 PROCEDURE — 3008F BODY MASS INDEX DOCD: CPT | Mod: CPTII,S$GLB,,

## 2022-01-13 RX ORDER — TRAZODONE HYDROCHLORIDE 50 MG/1
50 TABLET ORAL NIGHTLY PRN
Qty: 30 TABLET | Refills: 11 | Status: SHIPPED | OUTPATIENT
Start: 2022-01-13 | End: 2022-04-08 | Stop reason: SDUPTHER

## 2022-01-13 RX ORDER — BUSPIRONE HYDROCHLORIDE 5 MG/1
10 TABLET ORAL 3 TIMES DAILY PRN
Qty: 270 TABLET | Refills: 0 | Status: SHIPPED | OUTPATIENT
Start: 2022-01-13 | End: 2022-06-08

## 2022-01-13 NOTE — PATIENT INSTRUCTIONS
If you are due for any health screening(s) below please notify me so we can arrange them to be ordered and scheduled to maintain your health.     Health Maintenance   Topic Date Due    Hepatitis C Screening  Never done    Lipid Panel  Never done    TETANUS VACCINE  Never done    Mammogram  05/22/2021       Breast Cancer Screening    Breast cancer is the second most common cancer in women after skin cancer, and the second leading cause of death from cancer after lung cancer. Mammograms can detect breast cancer early, which significantly increases the chances of curing the cancer.      A screening mammogram is an x-ray image of the breasts used for early breast cancer detection. It can help reduce the number of deaths from breast cancer among women. To get a clear image, the breast is placed between two plastic plates to make it flat. How often a mammogram is needed depends on your age and your breast cancer risk.    Although you are still overdue for this important screening, due to the COVID-19 pandemic, we recommend scheduling it in the near future.    Colon Cancer Screening    Of cancers affecting both men and women, colorectal cancer is the third leading cancer killer in the [place]. But it doesnt have to be. Screening can prevent colorectal cancer or find it at an early stage when treatment often leads to a cure.    A colonoscopy is the preferred test for detecting colon cancer. It is needed only once every 10 years if results are negative. While sedated, a flexible, lighted tube with a tiny camera is inserted into the rectum and advanced through the colon to look for cancers. An alternative screening test that is used at home and returned to the lab may also be used. It detects hidden blood in bowel movements which could indicate cancer in the colon. If results are positive, you will need a colonoscopy to determine if the blood is a sign of cancer. This type of follow up (diagnostic) colonoscopy  usually requires additional copays as required by your insurance provider. Please contact your PCP if you have any questions.    Although you are still overdue for this important screening, due to the COVID-19 pandemic, we recommend scheduling it in the near future.
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
## 2022-01-18 ENCOUNTER — LAB VISIT (OUTPATIENT)
Dept: LAB | Facility: HOSPITAL | Age: 52
End: 2022-01-18
Attending: STUDENT IN AN ORGANIZED HEALTH CARE EDUCATION/TRAINING PROGRAM
Payer: COMMERCIAL

## 2022-01-18 DIAGNOSIS — N20.0 RIGHT RENAL STONE: ICD-10-CM

## 2022-01-18 LAB
ANION GAP SERPL CALC-SCNC: 13 MMOL/L (ref 8–16)
BUN SERPL-MCNC: 12 MG/DL (ref 6–20)
CALCIUM SERPL-MCNC: 10.4 MG/DL (ref 8.7–10.5)
CHLORIDE SERPL-SCNC: 100 MMOL/L (ref 95–110)
CO2 SERPL-SCNC: 24 MMOL/L (ref 23–29)
CREAT SERPL-MCNC: 0.8 MG/DL (ref 0.5–1.4)
EST. GFR  (AFRICAN AMERICAN): >60 ML/MIN/1.73 M^2
EST. GFR  (NON AFRICAN AMERICAN): >60 ML/MIN/1.73 M^2
GLUCOSE SERPL-MCNC: 111 MG/DL (ref 70–110)
POTASSIUM SERPL-SCNC: 3.7 MMOL/L (ref 3.5–5.1)
SODIUM SERPL-SCNC: 137 MMOL/L (ref 136–145)

## 2022-01-18 PROCEDURE — 80048 BASIC METABOLIC PNL TOTAL CA: CPT | Performed by: STUDENT IN AN ORGANIZED HEALTH CARE EDUCATION/TRAINING PROGRAM

## 2022-01-18 PROCEDURE — 36415 COLL VENOUS BLD VENIPUNCTURE: CPT | Performed by: STUDENT IN AN ORGANIZED HEALTH CARE EDUCATION/TRAINING PROGRAM

## 2022-02-11 ENCOUNTER — OFFICE VISIT (OUTPATIENT)
Dept: FAMILY MEDICINE | Facility: CLINIC | Age: 52
End: 2022-02-11
Payer: COMMERCIAL

## 2022-02-11 VITALS
OXYGEN SATURATION: 95 % | BODY MASS INDEX: 30.23 KG/M2 | TEMPERATURE: 99 F | WEIGHT: 204.13 LBS | DIASTOLIC BLOOD PRESSURE: 64 MMHG | SYSTOLIC BLOOD PRESSURE: 122 MMHG | HEIGHT: 69 IN | RESPIRATION RATE: 16 BRPM | HEART RATE: 78 BPM

## 2022-02-11 DIAGNOSIS — F43.21 GRIEF: ICD-10-CM

## 2022-02-11 DIAGNOSIS — F32.A ANXIETY AND DEPRESSION: Primary | ICD-10-CM

## 2022-02-11 DIAGNOSIS — F41.9 ANXIETY AND DEPRESSION: Primary | ICD-10-CM

## 2022-02-11 DIAGNOSIS — G47.00 INSOMNIA, UNSPECIFIED TYPE: ICD-10-CM

## 2022-02-11 PROCEDURE — 99999 PR PBB SHADOW E&M-EST. PATIENT-LVL V: CPT | Mod: PBBFAC,,,

## 2022-02-11 PROCEDURE — 99999 PR PBB SHADOW E&M-EST. PATIENT-LVL V: ICD-10-PCS | Mod: PBBFAC,,,

## 2022-02-11 PROCEDURE — 1160F PR REVIEW ALL MEDS BY PRESCRIBER/CLIN PHARMACIST DOCUMENTED: ICD-10-PCS | Mod: CPTII,S$GLB,,

## 2022-02-11 PROCEDURE — 3008F PR BODY MASS INDEX (BMI) DOCUMENTED: ICD-10-PCS | Mod: CPTII,S$GLB,,

## 2022-02-11 PROCEDURE — 3074F PR MOST RECENT SYSTOLIC BLOOD PRESSURE < 130 MM HG: ICD-10-PCS | Mod: CPTII,S$GLB,,

## 2022-02-11 PROCEDURE — 3078F DIAST BP <80 MM HG: CPT | Mod: CPTII,S$GLB,,

## 2022-02-11 PROCEDURE — 1159F PR MEDICATION LIST DOCUMENTED IN MEDICAL RECORD: ICD-10-PCS | Mod: CPTII,S$GLB,,

## 2022-02-11 PROCEDURE — 3078F PR MOST RECENT DIASTOLIC BLOOD PRESSURE < 80 MM HG: ICD-10-PCS | Mod: CPTII,S$GLB,,

## 2022-02-11 PROCEDURE — 3008F BODY MASS INDEX DOCD: CPT | Mod: CPTII,S$GLB,,

## 2022-02-11 PROCEDURE — 1160F RVW MEDS BY RX/DR IN RCRD: CPT | Mod: CPTII,S$GLB,,

## 2022-02-11 PROCEDURE — 99213 OFFICE O/P EST LOW 20 MIN: CPT | Mod: S$GLB,,,

## 2022-02-11 PROCEDURE — 99213 PR OFFICE/OUTPT VISIT, EST, LEVL III, 20-29 MIN: ICD-10-PCS | Mod: S$GLB,,,

## 2022-02-11 PROCEDURE — 1159F MED LIST DOCD IN RCRD: CPT | Mod: CPTII,S$GLB,,

## 2022-02-11 PROCEDURE — 3074F SYST BP LT 130 MM HG: CPT | Mod: CPTII,S$GLB,,

## 2022-02-11 NOTE — PROGRESS NOTES
Subjective:       Patient ID: Tereza Weinstein is a 52 y.o. female.    Chief Complaint: Follow-up    Suzanne Weinstein is a 53 yo F pt w/ MHx listed below that presents to the clinic for follow up of grief and depression. She recently lost her sister in a tragic car accident. At our previous visit I increased her buspar to 10mg tide is also taking 10mg lexapro qd and trazodone nightly for sleep aid. She is doing well on the current med regimen and she admits that she feels like it is helping her daily. She does not feel like a dose adjustment is necessary and she denies any side effects of treatment. She is scheduled for grief counseling on the 22nd of February. She has been taking small steps to bring her life back to normalcy. She is planning to attend a parade with the family.       Past Medical History:   Diagnosis Date    Kidney stone     PONV (postoperative nausea and vomiting)        Review of patient's allergies indicates:   Allergen Reactions    Penicillins      I was young unsure of reaction           Current Outpatient Medications:     busPIRone (BUSPAR) 5 MG Tab, Take 2 tablets (10 mg total) by mouth 3 (three) times daily as needed (anxiety)., Disp: 270 tablet, Rfl: 0    EScitalopram oxalate (LEXAPRO) 10 MG tablet, Take 1 tablet (10 mg total) by mouth once daily., Disp: 90 tablet, Rfl: 0    hydroCHLOROthiazide (HYDRODIURIL) 25 MG tablet, Take 1 tablet (25 mg total) by mouth once daily., Disp: 30 tablet, Rfl: 11    traZODone (DESYREL) 50 MG tablet, Take 1 tablet (50 mg total) by mouth nightly as needed for Insomnia., Disp: 30 tablet, Rfl: 11    Current Facility-Administered Medications:     acetaminophen tablet 650 mg, 650 mg, Oral, Once PRN, Loreta Cummins MD    albuterol inhaler 2 puff, 2 puff, Inhalation, Q20 Min PRN, Loreta Cummins MD    diphenhydrAMINE injection 25 mg, 25 mg, Intravenous, Once PRN, Loreta Cummins MD    EPINEPHrine (EPIPEN) 0.3 mg/0.3 mL pen injection 0.3 mg, 0.3 mg, Intramuscular,  "Loreta RODRIGUEZ MD    methylPREDNISolone sodium succinate injection 40 mg, 40 mg, Intravenous, Once PRLoreta PERES MD    ondansetron disintegrating tablet 4 mg, 4 mg, Oral, Once PRNLoreta MD    sodium chloride 0.9% 500 mL flush bag, , Intravenous, PRLoreta PERES MD, Stopped at 09/07/21 1150    sodium chloride 0.9% flush 10 mL, 10 mL, Intravenous, PRLoreta PERES MD    Review of Systems   Constitutional: Positive for activity change. Negative for appetite change, chills, diaphoresis, fatigue, fever and unexpected weight change.   HENT: Negative for congestion, ear pain, postnasal drip, rhinorrhea, sinus pressure, sneezing, sore throat and trouble swallowing.    Eyes: Negative for pain, itching and visual disturbance.   Respiratory: Negative for cough, chest tightness, shortness of breath and wheezing.    Cardiovascular: Negative for chest pain, palpitations and leg swelling.   Gastrointestinal: Negative for abdominal distention, abdominal pain, blood in stool, constipation, diarrhea, nausea and vomiting.   Endocrine: Negative for cold intolerance and heat intolerance.   Genitourinary: Negative for difficulty urinating, dysuria, frequency, hematuria and urgency.   Musculoskeletal: Negative for arthralgias, back pain, myalgias and neck pain.   Skin: Negative for color change, pallor, rash and wound.   Neurological: Negative for dizziness, syncope, weakness, numbness and headaches.   Hematological: Negative for adenopathy.   Psychiatric/Behavioral: Positive for dysphoric mood. Negative for agitation, behavioral problems, self-injury, sleep disturbance and suicidal ideas. The patient is not nervous/anxious.        Objective:      /64 (BP Location: Right arm, Patient Position: Sitting, BP Method: Large (Manual))   Pulse 78   Temp 98.9 °F (37.2 °C) (Oral)   Resp 16   Ht 5' 9" (1.753 m)   Wt 92.6 kg (204 lb 2.3 oz)   SpO2 95%   BMI 30.15 kg/m²   Physical Exam  Vitals reviewed. "   Constitutional:       General: She is not in acute distress.     Appearance: Normal appearance. She is obese. She is not ill-appearing, toxic-appearing or diaphoretic.   HENT:      Head: Normocephalic.      Right Ear: External ear normal.      Left Ear: External ear normal.      Nose: Nose normal. No congestion or rhinorrhea.      Mouth/Throat:      Mouth: Mucous membranes are moist.      Pharynx: Oropharynx is clear.   Eyes:      General: No scleral icterus.        Right eye: No discharge.         Left eye: No discharge.      Extraocular Movements: Extraocular movements intact.      Conjunctiva/sclera: Conjunctivae normal.   Cardiovascular:      Rate and Rhythm: Normal rate and regular rhythm.      Pulses: Normal pulses.      Heart sounds: Normal heart sounds. No murmur heard.  No friction rub. No gallop.    Pulmonary:      Effort: Pulmonary effort is normal. No respiratory distress.      Breath sounds: Normal breath sounds. No wheezing, rhonchi or rales.   Chest:      Chest wall: No tenderness.   Abdominal:      General: Bowel sounds are normal. There is no distension.      Palpations: Abdomen is soft. There is no mass.      Tenderness: There is no abdominal tenderness. There is no guarding.   Musculoskeletal:         General: No swelling, tenderness or deformity. Normal range of motion.      Cervical back: Normal range of motion.      Right lower leg: No edema.      Left lower leg: No edema.   Skin:     General: Skin is warm and dry.      Capillary Refill: Capillary refill takes less than 2 seconds.      Coloration: Skin is not jaundiced.      Findings: No bruising, erythema, lesion or rash.   Neurological:      Mental Status: She is alert and oriented to person, place, and time.   Psychiatric:         Behavior: Behavior normal.         Thought Content: Thought content normal.         Judgment: Judgment normal.      Comments: Tearful at times           Assessment:       1. Anxiety and depression    2. Insomnia,  unspecified type    3. Grief        Plan:       Anxiety and depression       -    Stable. Continue meds as prescribed. Will continue to monitor    Insomnia, unspecified type       -    Stable. Continue meds as prescribed. Will continue to monitor.     Grief       -    Stable. Continue meds as prescribed. Will continue to monitor. Patient to see grief counselor on 02/22/22.       Patient to follow up with me in two months to touch base on how she is doing and how counseling is going. Instructed patient to contact clinic if she needs anything.          Chaparro Bansal PA-C  Family Medicine Physician Assistant       I spent a total of 20 minutes on the day of the visit.This includes face to face time and non-face to face time preparing to see the patient (eg, review of tests), obtaining and/or reviewing separately obtained history, documenting clinical information in the electronic or other health record, independently interpreting results and communicating results to the patient/family/caregiver, or care coordinator.      We have addressed [3] Low: 2 or more self-limited or minor problems / 1 stable chronic illness / 1 acute, uncomplicated illness or injury  The complexity of the data reviewed and analyzed for this visit was [3] Limited (Reviewed prior external note, ordered unique testing or reviewed the results of each unique test)   The risk of complications and/or morbidity or mortality are [4] Moderate risk (I.e. prescription drug management / decision regarding minor surgery with identified pt or procedure risk factors / decision regarding elective major surgery without identified pt or procedure risk factors / diagnosis or treatment significantly limited by social determinants of health)   The level of Medical Decision Making for this visit is [3] Low

## 2022-02-14 ENCOUNTER — TELEPHONE (OUTPATIENT)
Dept: UROLOGY | Facility: CLINIC | Age: 52
End: 2022-02-14
Payer: COMMERCIAL

## 2022-02-14 NOTE — TELEPHONE ENCOUNTER
Spoke with patient, informed we received her litholink results and the provider would like to discuss during virtual visit. appt made, informed to contact the office if having trouble logging on, patient verbally understood.

## 2022-02-22 ENCOUNTER — OFFICE VISIT (OUTPATIENT)
Dept: PSYCHIATRY | Facility: CLINIC | Age: 52
End: 2022-02-22
Payer: COMMERCIAL

## 2022-02-22 DIAGNOSIS — F43.21 COMPLICATED GRIEF: ICD-10-CM

## 2022-02-22 PROCEDURE — 99999 PR PBB SHADOW E&M-EST. PATIENT-LVL I: ICD-10-PCS | Mod: PBBFAC,,, | Performed by: CASE MANAGER/CARE COORDINATOR

## 2022-02-22 PROCEDURE — 99999 PR PBB SHADOW E&M-EST. PATIENT-LVL I: CPT | Mod: PBBFAC,,, | Performed by: CASE MANAGER/CARE COORDINATOR

## 2022-02-22 PROCEDURE — 90791 PSYCH DIAGNOSTIC EVALUATION: CPT | Mod: S$GLB,,, | Performed by: CASE MANAGER/CARE COORDINATOR

## 2022-02-22 PROCEDURE — 90791 PR PSYCHIATRIC DIAGNOSTIC EVALUATION: ICD-10-PCS | Mod: S$GLB,,, | Performed by: CASE MANAGER/CARE COORDINATOR

## 2022-02-22 NOTE — PROGRESS NOTES
Primary Care Behavioral Health: Initial  Patient Name: Tereza Weinstein  Date:  2022   Site:  [facility]  Referral source: Chaparro Bansal PA-C    Chief complaint/reason for encounter:  Grief and Anxiety    History of present illness:  Ms. Tereza Weinstein is a 52 y.o. Not  or /a female referred by Chaparro Bansal PA-C.  Patient was seen, examined and chart was reviewed.  Tereza Weinstein reviewed and agreed to informed consent and the limits of confidentiality. Patient shared that her sister  in a one car accident in December. She reported that she was on the phone with her sister when it happened. She stated that she did not hear the accident and thought the phone just disconnected. Patient shared that she was close to her sister. She noted that she not been leaving her home as much since the accident. She noted feelings of guilt that she was not there to help her sister and guilt that she was on the phone with her sister and thoughts that if she was not on the phone with her sister, the accident might not have occurred. Patient stated that she is  and her primary support is her mother who she helps manage care for. Patient noted that her mother lives and [place]. 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    Past Medical History:   Diagnosis Date    Kidney stone     PONV (postoperative nausea and vomiting)          Current Outpatient Medications:     busPIRone (BUSPAR) 5 MG Tab, Take 2 tablets (10 mg total) by mouth 3 (three) times daily as needed (anxiety)., Disp: 270 tablet, Rfl: 0    EScitalopram oxalate (LEXAPRO) 10 MG tablet, TAKE 1 TABLET(10 MG) BY MOUTH EVERY DAY, Disp: 90 tablet, Rfl: 0    hydroCHLOROthiazide (HYDRODIURIL) 25 MG tablet, Take 1 tablet (25 mg total) by mouth once daily., Disp: 30 tablet, Rfl: 11    traZODone (DESYREL) 50 MG tablet, Take 1 tablet (50 mg  total) by mouth nightly as needed for Insomnia., Disp: 30 tablet, Rfl: 11    Current Facility-Administered Medications:     acetaminophen tablet 650 mg, 650 mg, Oral, Once PRNLoreta MD    albuterol inhaler 2 puff, 2 puff, Inhalation, Q20 Min PRN, Loreta Cummins MD    diphenhydrAMINE injection 25 mg, 25 mg, Intravenous, Once PRNLoreta MD    EPINEPHrine (EPIPEN) 0.3 mg/0.3 mL pen injection 0.3 mg, 0.3 mg, Intramuscular, PRN, Loreta Cummins MD    methylPREDNISolone sodium succinate injection 40 mg, 40 mg, Intravenous, Once PRN, Loreta Cummins MD    ondansetron disintegrating tablet 4 mg, 4 mg, Oral, Once PRN, Loreta Cummins MD    sodium chloride 0.9% 500 mL flush bag, , Intravenous, PRN, Loreta Cummins MD, Stopped at 09/07/21 1150    sodium chloride 0.9% flush 10 mL, 10 mL, Intravenous, PRLoreta PERES MD      Psychiatric history:  Previous diagnosis: Anxiety  Psychiatric medication: Patient denies  Previous hospitalizations: Patient denies  History of outpatient treatment: Patient denied  Previous suicide attempt:  Patient denies.  Family history of psychiatric illness: Patient stated her mother has anxiety and depression.    Current and past substance use:  Alcohol:  Denied current use.  Denied history of abuse or dependency.  Drugs:  Denied current use.  Denied history of abuse or dependency.  Tobacco:  Smokes between four cigarettes and a pack a day.  Caffeine:  Two to three caffinated drinks per day.      Psychiatric symptoms:  Depression:  Reported diminished mood for at least half of the day five days in the past two weeks; stated weekends are harder for her; difficulties focusing; She denied suicidal ideation.  Aye/Hypomania:  Denied.  She denied periods of elevated mood or abnormally increased energy or goal-directed activity.  Anxiety:  Shakes when thinking of her sister's accident; difficulty focusing when anxious  Thoughts:  Denied delusions, hallucinations.  Suicidal thoughts/behaviors:   "Patient denied suicidal and homicidal ideation, plan and intent.  Patient noted agreement to call 911/and or present to the ED if she experienced suicidal or homicidal ideation, plan or intent.    Self-injury:  Denied.  Sleep: Stated her sleep has been "better" recently but still reports sleep maintenance difficulties and not feeling restful when waking up.  Trauma: Sister's death      Mental Status Exam:  General appearance:  appears stated age, neatly dressed, well groomed  Speech:  normal rate, normal tone, normal pitch, normal volume  Level of cooperation:  cooperative  Thought processes:  logical, goal-directed  Mood:  Euthymic and somewhat anxious  Thought content:  no illusions, no visual hallucinations, no auditory hallucinations, no delusions, no active or passive homicidal thoughts, no active or passive suicidal ideation, no obsessions, no compulsions, no violence  Affect:  Appropriate and appeared sad while discussing her sister's death as evidenced by her tearing up at those points of the session  Orientation:  oriented to person, place, situation and date  Memory/Attention and Concentration:  No gross deficits made evident during conversation.  Judgment and insight: fair  Language:  intact    Over the last 2 weeks, how often have you been bothered by any of the following problems?  Little interest or pleasure in doing things: Nearly every day  Feeling down, depressed, or hopeless: Several days  Trouble falling or staying asleep, or sleeping too much: More than half the days  Feeling tired or having little energy: Several days  Poor appetite or overeating: Several days  Feeling bad about yourself - or that you are a failure or have let yourself or your family down: Not at all  Trouble concentrating on things, such as reading the newspaper or watching television: Several days  Moving or speaking so slowly that other people could have noticed. Or the opposite - being so fidgety or restless that you have " been moving around a lot more than usual: Not at all  Thoughts that you would be better off dead, or of hurting yourself in some way: Not at all  PHQ-9 Total Score: 9  If you checked off any problems, how difficult have these problems made it for you to do your work, take care of things at home, or get along with other people?: Somewhat difficult      GAD7 2/22/2022   1. Feeling nervous, anxious, or on edge? 2   2. Not being able to stop or control worrying? 1   3. Worrying too much about different things? 1   4. Trouble relaxing? 1   5. Being so restless that it is hard to sit still? 1   6. Becoming easily annoyed or irritable? 0   7. Feeling afraid as if something awful might happen? 1   FIFI-7 Score 7       Impression:    Patient appears to be suffering from grief related to the sudden death of her sister who she was close to. This appears to have led to patient not leaving the house much outside of going to work.  Patient would likely benefit from taking part in activities she enjoys to increase positive feelings. Discussed with patient ways she can honor her sister. Patient noted that they will soon be writing notes to her sister and putting them in balloons. Patient acknowledged that she has some fake plants and a cactus from her sister's service that she may keep some of as a way of having something representing her sister close to her. Patient appears to have guilt associated with her sister's death. Patient would benefit from processing this grief via CBT. Patient appeared open to these ideas during the session and motivated to return for therapy sessions.    Diagnosis:    1. Complicated grief  Ambulatory referral/consult to Psychiatry        Plan:      1) Behavioral Activation  2) Figure out ways to honor her sister  3) Learn CBT skills to process feelings of guilt  4) Follow-up in three weeks    Length of Appointment: 60 minutes        [place] License #[id number]

## 2022-02-24 NOTE — PROGRESS NOTES
[facility] Note    PCP: Primary Doctor No    Chief Complaint: kidney stones (VV)    SUBJECTIVE:       History of Present Illness:  Tereza Weinstein is a 52 y.o. female who presents to clinic for right renal stone. She is Established  to our clinic.     Patient completed 24 hour urine.   Hypercalciuria - 377 (high)  Volume - 2.33  Oxalate - 39  Sodium - 292 (high)  Citrate - 1422     1/13/22  US and KUB showed no stone or hydro.   Has been on HCTZ 25 mg. Tolerating well.   No flank pain. No hematuria or dysuria.   Has been drinking about 3 16 oz obrien a day.     5/10/21  Patient presents for virtual visit to discuss 24 hour urine results.   Low urine volume (1.78), hypercalciuria (362), hyperuricosuria.    She is doing well without complaints today.   She does not take any vitamins or supplements. She does eat a lot of animal protein. Claims her sodium intake is not excessive.     3/24/21  Patient is s/p right PCNL on 12/11/20. Her stent was removed 12/23.   She underwent KUB and renal US which do not show residual stone or hydronephrosis.     She is doing well post op. She has no hematuria. She has occasional back pain mostly at night and at times is bilateral.     11/30/20  Patient presented to the ED on 11/23 with right flank pain and was discovered to have a 2 cm right renal pelvis stone with obstruction of the UPJ. She underwent stent placement the next day and was discharged home.    Patient has been having significant discomfort from the stent however otherwise doing well. No fevers. Complaining of flank and bladder discomfort.     Working on insurance, states she will have insurance starting at midnight tonight.     Last urine culture: no growth (11/23/20)    Lab Results   Component Value Date    CREATININE 0.8 01/18/2022     Current smoker.    Past medical, family, and social history reviewed as documented in chart with pertinent positive medical, family, and social history  "detailed in HPI.    Review of patient's allergies indicates:   Allergen Reactions    Penicillins      I was young unsure of reaction         Past Medical History:   Diagnosis Date    Kidney stone     PONV (postoperative nausea and vomiting)      Past Surgical History:   Procedure Laterality Date    ANTEGRADE NEPHROSTOGRAPHY Right 12/11/2020    Procedure: NEPHROSTOGRAM, ANTEGRADE;  Surgeon: Yuko Hawkins MD;  Location: [facility] OR;  Service: Urology;  Laterality: Right;    CYSTOSCOPY W/ URETERAL STENT PLACEMENT Right 11/24/2020    Procedure: CYSTOSCOPY, WITH URETERAL STENT INSERTION;  Surgeon: Yuko Hawkins MD;  Location: [facility] OR;  Service: Urology;  Laterality: Right;    CYSTOSCOPY W/ URETERAL STENT REMOVAL Right 12/23/2020    Procedure: CYSTOSCOPY, WITH URETERAL STENT REMOVAL;  Surgeon: Yuko Hawkins MD;  Location[facility] OR;  Service: Urology;  Laterality: Right;    HYSTERECTOMY      PERCUTANEOUS NEPHROLITHOTRIPSY Right 12/11/2020    Procedure: NEPHROLITHOTRIPSY, PERCUTANEOUS;  Surgeon: Yuko Hawkins MD;  Location: [facility] OR;  Service: Urology;  Laterality: Right;    PERCUTANEOUS NEPHROSTOMY Right 12/10/2020    Procedure: Creation, Nephrostomy, Percutaneous;  Surgeon: [facility];  Location: [facility] OR;  Service: General;  Laterality: Right;     No family history on file.  Social History     Tobacco Use    Smoking status: Current Some Day Smoker     Packs/day: 0.50     Types: Cigarettes    Smokeless tobacco: Never Used   Substance Use Topics    Alcohol use: No    Drug use: No        Review of Systems   Genitourinary: Negative for difficulty urinating, dysuria, flank pain, hematuria, nocturia and urgency.       OBJECTIVE:     Anticoagulation: no    Estimated body mass index is 30.15 kg/m² as calculated from the following:    Height as of 2/11/22: 5' 9" (1.753 m).    Weight as of 2/11/22: 92.6 kg (204 lb 2.3 oz).    Vital Signs (Most Recent)  (no vitals taken for " VV)    Physical Exam  Constitutional:       General: She is not in acute distress.     Appearance: She is not ill-appearing.   HENT:      Head: Normocephalic and atraumatic.   Eyes:      General: No scleral icterus.  Skin:     Coloration: Skin is not jaundiced.   Neurological:      General: No focal deficit present.      Mental Status: She is alert and oriented to person, place, and time.   Psychiatric:         Mood and Affect: Mood normal.         Behavior: Behavior normal.         Thought Content: Thought content normal.         BMP  Lab Results   Component Value Date     01/18/2022    K 3.7 01/18/2022     01/18/2022    CO2 24 01/18/2022    BUN 12 01/18/2022    CREATININE 0.8 01/18/2022    CALCIUM 10.4 01/18/2022    ANIONGAP 13 01/18/2022    ESTGFRAFRICA >60 01/18/2022    EGFRNONAA >60 01/18/2022       Lab Results   Component Value Date    WBC 14.81 (H) 12/12/2020    HGB 12.0 12/12/2020    HCT 38.3 12/12/2020     (H) 12/12/2020     12/12/2020       Imaging:  CTRSS 11/23/20:  There is a 22 mm right renal calculus in the renal pelvis centrally  and perhaps lodged at the ureteropelvic junction, with mild  hydronephrosis. There is no additional 2 mm nonobstructing right lower  pole renal calculus, with right perinephric and proximal right  periureteral stranding reflecting edema. There are no left renal or  ureteral calculi, with no left-sided hydroureteronephrosis. Hypodense  left upper pole renal lesion is nonspecific but suggestive of a cyst.    ASSESSMENT     1. Nephrolithiasis    2. Nicotine use disorder        PLAN:     - PTH and uric acid   - Needs to decrease amount of sodium and animal protein in diet   - Continue HCTZ 25 mg  - Follow up in 1 year or sooner or needed       Yuko Hawkins MD

## 2022-02-28 ENCOUNTER — OFFICE VISIT (OUTPATIENT)
Dept: UROLOGY | Facility: CLINIC | Age: 52
End: 2022-02-28
Payer: COMMERCIAL

## 2022-02-28 ENCOUNTER — PATIENT MESSAGE (OUTPATIENT)
Dept: UROLOGY | Facility: CLINIC | Age: 52
End: 2022-02-28

## 2022-02-28 DIAGNOSIS — F17.200 NICOTINE USE DISORDER: ICD-10-CM

## 2022-02-28 DIAGNOSIS — N20.0 NEPHROLITHIASIS: Primary | ICD-10-CM

## 2022-02-28 PROCEDURE — 99213 OFFICE O/P EST LOW 20 MIN: CPT | Mod: 95,,, | Performed by: STUDENT IN AN ORGANIZED HEALTH CARE EDUCATION/TRAINING PROGRAM

## 2022-02-28 PROCEDURE — 99213 PR OFFICE/OUTPT VISIT, EST, LEVL III, 20-29 MIN: ICD-10-PCS | Mod: 95,,, | Performed by: STUDENT IN AN ORGANIZED HEALTH CARE EDUCATION/TRAINING PROGRAM

## 2022-03-02 ENCOUNTER — LAB VISIT (OUTPATIENT)
Dept: LAB | Facility: HOSPITAL | Age: 52
End: 2022-03-02
Attending: STUDENT IN AN ORGANIZED HEALTH CARE EDUCATION/TRAINING PROGRAM
Payer: COMMERCIAL

## 2022-03-02 DIAGNOSIS — R79.89 ELEVATED PARATHYROID HORMONE: Primary | ICD-10-CM

## 2022-03-02 DIAGNOSIS — N20.0 NEPHROLITHIASIS: ICD-10-CM

## 2022-03-02 LAB
PTH-INTACT SERPL-MCNC: 115.2 PG/ML (ref 9–77)
URATE SERPL-MCNC: 3.4 MG/DL (ref 2.4–5.7)

## 2022-03-02 PROCEDURE — 83970 ASSAY OF PARATHORMONE: CPT | Performed by: STUDENT IN AN ORGANIZED HEALTH CARE EDUCATION/TRAINING PROGRAM

## 2022-03-02 PROCEDURE — 36415 COLL VENOUS BLD VENIPUNCTURE: CPT | Performed by: STUDENT IN AN ORGANIZED HEALTH CARE EDUCATION/TRAINING PROGRAM

## 2022-03-02 PROCEDURE — 84550 ASSAY OF BLOOD/URIC ACID: CPT | Performed by: STUDENT IN AN ORGANIZED HEALTH CARE EDUCATION/TRAINING PROGRAM

## 2022-03-03 ENCOUNTER — PATIENT MESSAGE (OUTPATIENT)
Dept: UROLOGY | Facility: CLINIC | Age: 52
End: 2022-03-03
Payer: COMMERCIAL

## 2022-03-04 ENCOUNTER — PATIENT MESSAGE (OUTPATIENT)
Dept: ENDOCRINOLOGY | Facility: CLINIC | Age: 52
End: 2022-03-04
Payer: COMMERCIAL

## 2022-03-18 ENCOUNTER — HOSPITAL ENCOUNTER (OUTPATIENT)
Dept: RADIOLOGY | Facility: HOSPITAL | Age: 52
Discharge: HOME | End: 2022-03-18
Attending: STUDENT IN AN ORGANIZED HEALTH CARE EDUCATION/TRAINING PROGRAM
Payer: COMMERCIAL

## 2022-03-18 ENCOUNTER — LAB VISIT (OUTPATIENT)
Dept: LAB | Facility: HOSPITAL | Age: 52
End: 2022-03-18
Payer: COMMERCIAL

## 2022-03-18 ENCOUNTER — OFFICE VISIT (OUTPATIENT)
Dept: ENDOCRINOLOGY | Facility: CLINIC | Age: 52
End: 2022-03-18
Payer: COMMERCIAL

## 2022-03-18 VITALS
BODY MASS INDEX: 29.89 KG/M2 | DIASTOLIC BLOOD PRESSURE: 78 MMHG | HEIGHT: 69 IN | OXYGEN SATURATION: 99 % | HEART RATE: 72 BPM | SYSTOLIC BLOOD PRESSURE: 130 MMHG | WEIGHT: 201.81 LBS

## 2022-03-18 DIAGNOSIS — N20.0 NEPHROLITHIASIS: ICD-10-CM

## 2022-03-18 DIAGNOSIS — E21.0 PRIMARY HYPERPARATHYROIDISM: ICD-10-CM

## 2022-03-18 DIAGNOSIS — E21.0 PRIMARY HYPERPARATHYROIDISM: Primary | ICD-10-CM

## 2022-03-18 DIAGNOSIS — R79.89 ELEVATED PARATHYROID HORMONE: ICD-10-CM

## 2022-03-18 DIAGNOSIS — E21.3 HYPERPARATHYROIDISM: ICD-10-CM

## 2022-03-18 DIAGNOSIS — F17.200 NICOTINE USE DISORDER: ICD-10-CM

## 2022-03-18 DIAGNOSIS — N13.9 OBSTRUCTIVE UROPATHY: ICD-10-CM

## 2022-03-18 LAB
25(OH)D3+25(OH)D2 SERPL-MCNC: 26 NG/ML (ref 30–96)
ALBUMIN SERPL BCP-MCNC: 3.8 G/DL (ref 3.5–5.2)
ANION GAP SERPL CALC-SCNC: 9 MMOL/L (ref 8–16)
BUN SERPL-MCNC: 10 MG/DL (ref 6–20)
CALCIUM SERPL-MCNC: 10 MG/DL (ref 8.7–10.5)
CHLORIDE SERPL-SCNC: 105 MMOL/L (ref 95–110)
CO2 SERPL-SCNC: 25 MMOL/L (ref 23–29)
CREAT SERPL-MCNC: 0.7 MG/DL (ref 0.5–1.4)
EST. GFR  (AFRICAN AMERICAN): >60 ML/MIN/1.73 M^2
EST. GFR  (NON AFRICAN AMERICAN): >60 ML/MIN/1.73 M^2
GLUCOSE SERPL-MCNC: 153 MG/DL (ref 70–110)
PHOSPHATE SERPL-MCNC: 2.6 MG/DL (ref 2.7–4.5)
POTASSIUM SERPL-SCNC: 4.2 MMOL/L (ref 3.5–5.1)
PTH-INTACT SERPL-MCNC: 131.7 PG/ML (ref 9–77)
SODIUM SERPL-SCNC: 139 MMOL/L (ref 136–145)

## 2022-03-18 PROCEDURE — 77081 DXA BONE DENSITY APPENDICULR: CPT | Mod: 26,,, | Performed by: RADIOLOGY

## 2022-03-18 PROCEDURE — 36415 COLL VENOUS BLD VENIPUNCTURE: CPT | Performed by: STUDENT IN AN ORGANIZED HEALTH CARE EDUCATION/TRAINING PROGRAM

## 2022-03-18 PROCEDURE — 77081 DEXA BONE DENSITY APPENDICULAR SKELETON: ICD-10-PCS | Mod: 26,,, | Performed by: RADIOLOGY

## 2022-03-18 PROCEDURE — 80069 RENAL FUNCTION PANEL: CPT | Performed by: STUDENT IN AN ORGANIZED HEALTH CARE EDUCATION/TRAINING PROGRAM

## 2022-03-18 PROCEDURE — 99999 PR PBB SHADOW E&M-EST. PATIENT-LVL V: CPT | Mod: PBBFAC,,, | Performed by: INTERNAL MEDICINE

## 2022-03-18 PROCEDURE — 99204 OFFICE O/P NEW MOD 45 MIN: CPT | Mod: S$GLB,,, | Performed by: INTERNAL MEDICINE

## 2022-03-18 PROCEDURE — 99204 PR OFFICE/OUTPT VISIT, NEW, LEVL IV, 45-59 MIN: ICD-10-PCS | Mod: S$GLB,,, | Performed by: INTERNAL MEDICINE

## 2022-03-18 PROCEDURE — 77081 DXA BONE DENSITY APPENDICULR: CPT | Mod: TC

## 2022-03-18 PROCEDURE — 83970 ASSAY OF PARATHORMONE: CPT | Performed by: STUDENT IN AN ORGANIZED HEALTH CARE EDUCATION/TRAINING PROGRAM

## 2022-03-18 PROCEDURE — 99999 PR PBB SHADOW E&M-EST. PATIENT-LVL V: ICD-10-PCS | Mod: PBBFAC,,, | Performed by: INTERNAL MEDICINE

## 2022-03-18 PROCEDURE — 82306 VITAMIN D 25 HYDROXY: CPT | Performed by: STUDENT IN AN ORGANIZED HEALTH CARE EDUCATION/TRAINING PROGRAM

## 2022-03-18 RX ORDER — HYDROCHLOROTHIAZIDE 25 MG/1
25 TABLET ORAL 2 TIMES DAILY
Qty: 60 TABLET | Refills: 0 | Status: SHIPPED | OUTPATIENT
Start: 2022-03-18 | End: 2022-06-09

## 2022-03-18 RX ORDER — POTASSIUM CHLORIDE 750 MG/1
10 TABLET, EXTENDED RELEASE ORAL DAILY
Qty: 30 TABLET | Refills: 0 | Status: SHIPPED | OUTPATIENT
Start: 2022-03-18 | End: 2022-07-07 | Stop reason: SDUPTHER

## 2022-03-18 NOTE — PROGRESS NOTES
"  Subjective:      Patient ID: Tereza Weinstein is a 52 y.o. female.    Chief Complaint:  No chief complaint on file.      History of Present Illness  Presents for initial evaluation for renal stones and hyperparathyroidism    C/o renal stones beginning 15 years ago, since initially identified occurred yearly and were small enough pass, until recently.  She required hospitalization and urological intervention to remove in 2020 for 2 cm stone.  Endorses staying hydrated and limiting sodium    On HCTZ 25 mg daily since 1/2021    Denies any FH of renal stones or calcium issues.    Patient completed 24 hour urine.   Hypercalciuria - 377 (high)  Volume - 2.33  Oxalate - 39  Sodium - 292 (high)  Citrate - 1422        With regards to the hypercalcemia and/or primary hyperparathyroidism:    Daily intake of calcium is:  none  Taking vitamin D:  none    No   Yes  []    [x]  Neuro symptoms  []    [x]  Depression  []    [x]  Mental Fog  []    [x]  Anxiety  []    [x]  Polyuria - every 2 hours, c/o some dribbling, stress incontinence  []    [x]  Polydipsia - Drinking about 8 16 ounces bottles daily,  [x]    []  Anorexia, nausea, vomiting  [x]    []  Constipation   [x]    []  H/O Pancreatitis  [x]    []  GERD with frequent tums   [x]    []  Muscle weakness  []    [x]  Bone pain - right forearm, lower LE long bones  []    [x]  Fatigue    No   Yes  [x]    []  Fractures/osteoporosis  [x]    []  >2" height loss  []    [x]  Kidney stones  [x]    []  GFR <60    No   Yes  []    [x]  HCTZ  [x]    []  Lithium           ROS as above    Objective:   /78   Pulse 72   Ht 5' 9" (1.753 m)   Wt 91.6 kg (201 lb 13.3 oz)   SpO2 99%   BMI 29.81 kg/m²   Physical Exam  Constitutional:       General: She is not in acute distress.     Appearance: Normal appearance.   Cardiovascular:      Rate and Rhythm: Normal rate.   Pulmonary:      Effort: No respiratory distress.   Musculoskeletal:      Cervical back: Neck supple. No tenderness.      " Right lower leg: No edema.      Left lower leg: No edema.   Neurological:      Mental Status: She is oriented to person, place, and time.       BP Readings from Last 1 Encounters:   03/18/22 130/78      Wt Readings from Last 1 Encounters:   03/18/22 0826 91.6 kg (201 lb 13.3 oz)     Body mass index is 29.81 kg/m².    Lab Review:   No results found for: HGBA1C  No results found for: CHOL, HDL, LDLCALC, TRIG, CHOLHDL  Lab Results   Component Value Date     03/18/2022    K 4.2 03/18/2022     03/18/2022    CO2 25 03/18/2022     (H) 03/18/2022    BUN 10 03/18/2022    CREATININE 0.7 03/18/2022    CALCIUM 10.0 03/18/2022    PROT 6.7 11/23/2020    ALBUMIN 3.8 03/18/2022    BILITOT 0.7 11/23/2020    ALKPHOS 87 11/23/2020    AST 15 11/23/2020    ALT 21 11/23/2020    ANIONGAP 9 03/18/2022    ESTGFRAFRICA >60.0 03/18/2022    EGFRNONAA >60.0 03/18/2022       All pertinent labs reviewed    Assessment and Plan     Hyperparathyroidism  --Unknown etiology, 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  --DDx: Normocalcemic PHPT, adenoma, vs 4 gland hyperplasia with vit D def  --No prior PTH or workup for comparison prior to starting HCTZ 1/2021 for obstructive renal stones  --Baseline calcium 8.8 - 9.6 prior to HCTZ  --No stones clinically or on imaging since starting HCTZ  --Notable increase in calcium 8.8 > 10.4 after starting HCTZ with elevated PTH, low phos, mild vit d def  --Multiple nonspecific complaints    --Surgical indications:   --Nephrolithiasis recurrent x 15 years, calcium oxalate   --Hypercalcuria (377) not > 400    PLAN  -  Check PTH, renal panel w/ phos, vit D  -  Resume HCTZ due to renal stone risk & pt preference  -  Check bone DXA - include spine and radius  -  Consider replete vit D 1,000-2,000 IU daily     -  HCTZ challenge by increasing HCTZ 25 mg BID with 10 mgEq KCL daily x 1 month then resume HCTZ daily  -  Recheck renal panel, PTH in 1 month  -  Thyroid US   -  Avoid Dehydration, excessive calcium  supplementation      Obstructive uropathy  --Per pt ongoing for 15 years  --Complicated by obstructive stones in 2020  --Has been on HCTZ 25 mg since 1/2021. Tolerating well.   --Has been drinking about 6-8 16 oz obrien a day.      --5/10/21: Low urine volume (1.78), hypercalciuria (362), hyperuricosuria.  --s/p right PCNL on 12/11/20. Her stent was removed 12/23.   --She underwent KUB and renal US which do not show residual stone or hydronephrosis.   --11/30/20: Patient presented to the ED on 11/23 with right flank pain and was discovered to have a 2 cm right renal pelvis stone with obstruction of the UPJ.    --Follows with urology  --US and KUB showed no stone or hydro.       Nicotine use disorder  --Smoking cessation education provided      RTC in 4-6 months     Rudy Thibodeaux DO  Endocrinology Fellow  [facility], 6th Floor  [address] [place], [address]    Office: [phone]  Fax: [phone]       Disclaimer: This note has been generated using voice-recognition software. There may be typographical errors that have been missed during proof-reading.    The above history labs imaging impression and plan were discussed with attending physician who is in agreement and also took part in this patient's care.  I personally reviewed all of the patients available medications, labs, imaging, vitals, allergies, medical history.

## 2022-03-18 NOTE — ASSESSMENT & PLAN NOTE
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  --DDx: Normocalcemic PHPT, adenoma, vs 4 gland hyperplasia with vit D def  --No prior PTH or workup for comparison prior to starting HCTZ 1/2021 for obstructive renal stones  --Baseline calcium 8.8 - 9.6 prior to HCTZ  --No stones clinically or on imaging since starting HCTZ  --Notable increase in calcium 8.8 > 10.4 after starting HCTZ with elevated PTH, low phos, mild vit d def  --Multiple nonspecific complaints    --Surgical indications:   --Nephrolithiasis recurrent x 15 years, calcium oxalate   --Hypercalcuria (377) not > 400    PLAN  -  Check PTH, renal panel w/ phos, vit D  -  Resume HCTZ due to renal stone risk & pt preference  -  Check bone DXA - include spine and radius  -  Consider replete vit D 1,000-2,000 IU daily     -  HCTZ challenge by increasing HCTZ 25 mg BID with 10 mgEq KCL daily x 1 month then resume HCTZ daily  -  Recheck renal panel, PTH in 1 month  -  Thyroid US   -  Avoid Dehydration, excessive calcium supplementation

## 2022-03-18 NOTE — ASSESSMENT & PLAN NOTE
Per pt ongoing for 15 years  Complicated by obstructive stones in 2020  Has been on HCTZ 25 mg since 1/2021. Tolerating well.   Has been drinking about 6-8 16 oz obrien a day.      5/10/21: Low urine volume (1.78), hypercalciuria (362), hyperuricosuria.  s/p right PCNL on 12/11/20. Her stent was removed 12/23.   She underwent KUB and renal US which do not show residual stone or hydronephrosis.   11/30/20: Patient presented to the ED on 11/23 with right flank pain and was discovered to have a 2 cm right renal pelvis stone with obstruction of the UPJ.    Follows with urology  US and KUB showed no stone or hydro.

## 2022-03-28 ENCOUNTER — OFFICE VISIT (OUTPATIENT)
Dept: PSYCHIATRY | Facility: CLINIC | Age: 52
End: 2022-03-28
Payer: COMMERCIAL

## 2022-03-28 ENCOUNTER — PATIENT MESSAGE (OUTPATIENT)
Dept: PSYCHIATRY | Facility: CLINIC | Age: 52
End: 2022-03-28
Payer: COMMERCIAL

## 2022-03-28 DIAGNOSIS — F43.23 ADJUSTMENT DISORDER WITH MIXED ANXIETY AND DEPRESSED MOOD: Primary | ICD-10-CM

## 2022-03-28 PROCEDURE — 90834 PSYTX W PT 45 MINUTES: CPT | Mod: 95,,, | Performed by: CASE MANAGER/CARE COORDINATOR

## 2022-03-28 PROCEDURE — 90834 PR PSYCHOTHERAPY W/PATIENT, 45 MIN: ICD-10-PCS | Mod: 95,,, | Performed by: CASE MANAGER/CARE COORDINATOR

## 2022-03-28 NOTE — PROGRESS NOTES
"  Primary Care Behavioral Health: Follow-up  Patient Name: Tereza Weinstein  Date:  3/28/2022   Site:  [facility]  Referral source: Chaparro Bansal PA-C    The patient location is:  [facility] [employer] [facility]  The patient location County is: [facility]  The patient phone number is: [phone]  Visit type: Virtual visit with synchronous audio and video  Each patient to whom he or she provides medical services by telemedicine is:  (1) informed of the relationship between the provider and patient and the respective role of any other health care provider with respect to management of the patient; and (2) notified that he or she may decline to receive medical services by telemedicine and may withdraw from such care at any time.    Chief complaint/reason for encounter:  Grief and Anxiety    History of present illness:  Ms. Tereza Weinstein is a 52 y.o. Not  or /a female referred by Chaparro Bansal PA-C.  Patient was seen, examined and chart was reviewed.  Tereza Weinstein reviewed and agreed to informed consent and the limits of confidentiality. Patient shared that she has been talking about her sister's life to people and that it has helped her feel more positive. She reportedly felt "good" today. She reported that she still has difficulties with sleep including that it can sometimes take up to two hours for her to fall asleep. Patient stated she still misses her sister and has had some feelings of guilt in the past two weeks. Patient noted it has been difficult for her to talk to her mother because her mother is having more difficulties coping.    Past Medical History:   Diagnosis Date    Kidney stone     PONV (postoperative nausea and vomiting)          Current Outpatient Medications:     busPIRone (BUSPAR) 5 MG Tab, Take 2 tablets (10 mg total) by mouth 3 (three) times daily as needed (anxiety)., Disp: 270 tablet, Rfl: 0    EScitalopram oxalate " (LEXAPRO) 10 MG tablet, TAKE 1 TABLET(10 MG) BY MOUTH EVERY DAY, Disp: 90 tablet, Rfl: 0    hydroCHLOROthiazide (HYDRODIURIL) 25 MG tablet, Take 1 tablet (25 mg total) by mouth once daily., Disp: 30 tablet, Rfl: 11    hydroCHLOROthiazide (HYDRODIURIL) 25 MG tablet, Take 1 tablet (25 mg total) by mouth 2 (two) times daily. Take 1 tabley twice daily for 1 month and then decrease to daily, Disp: 60 tablet, Rfl: 0    potassium chloride SA (K-DUR,KLOR-CON) 10 MEQ tablet, Take 1 tablet (10 mEq total) by mouth once daily., Disp: 30 tablet, Rfl: 0    traZODone (DESYREL) 50 MG tablet, Take 1 tablet (50 mg total) by mouth nightly as needed for Insomnia., Disp: 30 tablet, Rfl: 11    Current Facility-Administered Medications:     acetaminophen tablet 650 mg, 650 mg, Oral, Once PRNLoreta MD    albuterol inhaler 2 puff, 2 puff, Inhalation, Q20 Min PRN, Loreta Cummins MD    diphenhydrAMINE injection 25 mg, 25 mg, Intravenous, Once PRN, Loreta Cummins MD    EPINEPHrine (EPIPEN) 0.3 mg/0.3 mL pen injection 0.3 mg, 0.3 mg, Intramuscular, PRN, Loreta Cummins MD    methylPREDNISolone sodium succinate injection 40 mg, 40 mg, Intravenous, Once PRNLoreta MD    ondansetron disintegrating tablet 4 mg, 4 mg, Oral, Once PRNLoreta MD    sodium chloride 0.9% 500 mL flush bag, , Intravenous, PRN, Loreta Cummins MD, Stopped at 09/07/21 1150    sodium chloride 0.9% flush 10 mL, 10 mL, Intravenous, PRNLoreta MD    Psychiatric symptoms:  Depression:  Reported diminished mood for at least half of the day one day in the past two weeks; difficulties focusing; diminished appetite since sister's death; She denied suicidal ideation.  Aye/Hypomania:  Some high energy that may be due to a gland issue of some kind. Stated it started before sister's death and that she has talked with medical prescriber about it.  Anxiety:  Shakes sometimes but less often when thinking of her sister's accident; difficulty focusing when  anxious  Thoughts:  Denied delusions, hallucinations.  Suicidal thoughts/behaviors:  Patient denied suicidal and homicidal ideation, plan and intent.  Patient noted agreement to call 911/and or present to the ED if she experienced suicidal or homicidal ideation, plan or intent.    Self-injury:  Denied.  Sleep: Stated her sleep onset and maintenance difficulties are still present  Trauma: Sister's death in December.      Mental Status Exam:  General appearance:  appears stated age, neatly dressed, well groomed  Speech:  normal rate, normal tone, normal pitch, normal volume  Level of cooperation:  cooperative  Thought processes:  logical, goal-directed  Mood:  Euthymic and somewhat anxious  Thought content:  no illusions, no visual hallucinations, no auditory hallucinations, no delusions, no active or passive homicidal thoughts, no active or passive suicidal ideation, no obsessions, no compulsions, no violence  Affect:  Appropriate and appeared sad while discussing her sister's death as evidenced by her tearing up at those points of the session  Orientation:  oriented to person, place, situation and date  Memory/Attention and Concentration:  No gross deficits made evident during conversation.  Judgment and insight: fair  Language:  intact    Over the last 2 weeks, how often have you been bothered by any of the following problems?  Little interest or pleasure in doing things: Not at all  Feeling down, depressed, or hopeless: Several days  Trouble falling or staying asleep, or sleeping too much: Several days  Feeling tired or having little energy: Not at all  Poor appetite or overeating: Not at all  Feeling bad about yourself - or that you are a failure or have let yourself or your family down: Not at all  Trouble concentrating on things, such as reading the newspaper or watching television: Several days  Moving or speaking so slowly that other people could have noticed. Or the opposite - being so fidgety or restless  that you have been moving around a lot more than usual: Not at all  Thoughts that you would be better off dead, or of hurting yourself in some way: Not at all  PHQ-9 Total Score: 3  If you checked off any problems, how difficult have these problems made it for you to do your work, take care of things at home, or get along with other people?: Not difficult at all  Past PHQ-9 Score = 9 (2/22/2022)    GAD7 3/28/2022 2/22/2022   1. Feeling nervous, anxious, or on edge? 2 2   2. Not being able to stop or control worrying? 2 1   3. Worrying too much about different things? 3 1   4. Trouble relaxing? 1 1   5. Being so restless that it is hard to sit still? 1 1   6. Becoming easily annoyed or irritable? 0 0   7. Feeling afraid as if something awful might happen? 0 1   8. If you checked off any problems, how difficult have these problems made it for you to do your work, take care of things at home, or get along with other people? 0 -   FIFI-7 Score 9 7       Impression:    Patient appears to be positively progressing as she reported less days of diminished mood in the last two weeks than she did the prior session. She does however still present with anxiety. Patient would benefit from continuing to engage in activities she enjoys to increase positive mood. Also discussed doing activities with her family since they are all grieving at different levels. Discussed sleep hygiene skill of getting out of bed when unable to sleep for more than 30 minutes and going to bed once feeling tired. Patient will be send a sleep journal through the portal to complete on her own. Patient stated she often feels tired to decreased quality of sleep. She stated she would complete the sleep journal for next session. Patient shared that another sister has been aggravating her and that it has affected her grieving process. Discussed healthy boundaries. Patient stated she would like to talk more about this sister in a future session about how her  behavior is affecting her.      Diagnosis:    1. Adjustment disorder with mixed anxiety and depressed mood          Plan:      1) Behavioral Activation  2) Complete Sleep Journal and increase sleep hygiene  3) Learn CBT skills to process feelings of guilt and better manage anxiety  4) Follow-up in three weeks    Length of Appointment: 40 minutes        Gaudencio Hood Psy.D.  [place] License #[id number]              Hill Orozco Psy.D.  Clinical Psychologist  [place] License #[id number]  [place] License #[id number]

## 2022-04-08 ENCOUNTER — OFFICE VISIT (OUTPATIENT)
Dept: FAMILY MEDICINE | Facility: CLINIC | Age: 52
End: 2022-04-08
Payer: COMMERCIAL

## 2022-04-08 VITALS
RESPIRATION RATE: 16 BRPM | TEMPERATURE: 98 F | SYSTOLIC BLOOD PRESSURE: 124 MMHG | WEIGHT: 198.63 LBS | DIASTOLIC BLOOD PRESSURE: 64 MMHG | HEIGHT: 69 IN | BODY MASS INDEX: 29.42 KG/M2 | HEART RATE: 76 BPM | OXYGEN SATURATION: 97 %

## 2022-04-08 DIAGNOSIS — F43.21 GRIEF: ICD-10-CM

## 2022-04-08 DIAGNOSIS — E83.52 SERUM CALCIUM ELEVATED: ICD-10-CM

## 2022-04-08 DIAGNOSIS — F32.A ANXIETY AND DEPRESSION: Primary | ICD-10-CM

## 2022-04-08 DIAGNOSIS — E21.3 HYPERPARATHYROIDISM: ICD-10-CM

## 2022-04-08 DIAGNOSIS — G47.00 INSOMNIA, UNSPECIFIED TYPE: ICD-10-CM

## 2022-04-08 DIAGNOSIS — F41.9 ANXIETY AND DEPRESSION: Primary | ICD-10-CM

## 2022-04-08 PROCEDURE — 3078F DIAST BP <80 MM HG: CPT | Mod: CPTII,S$GLB,,

## 2022-04-08 PROCEDURE — 3008F BODY MASS INDEX DOCD: CPT | Mod: CPTII,S$GLB,,

## 2022-04-08 PROCEDURE — 99214 PR OFFICE/OUTPT VISIT, EST, LEVL IV, 30-39 MIN: ICD-10-PCS | Mod: S$GLB,,,

## 2022-04-08 PROCEDURE — 1159F PR MEDICATION LIST DOCUMENTED IN MEDICAL RECORD: ICD-10-PCS | Mod: CPTII,S$GLB,,

## 2022-04-08 PROCEDURE — 1160F PR REVIEW ALL MEDS BY PRESCRIBER/CLIN PHARMACIST DOCUMENTED: ICD-10-PCS | Mod: CPTII,S$GLB,,

## 2022-04-08 PROCEDURE — 99999 PR PBB SHADOW E&M-EST. PATIENT-LVL V: CPT | Mod: PBBFAC,,,

## 2022-04-08 PROCEDURE — 1160F RVW MEDS BY RX/DR IN RCRD: CPT | Mod: CPTII,S$GLB,,

## 2022-04-08 PROCEDURE — 1159F MED LIST DOCD IN RCRD: CPT | Mod: CPTII,S$GLB,,

## 2022-04-08 PROCEDURE — 3008F PR BODY MASS INDEX (BMI) DOCUMENTED: ICD-10-PCS | Mod: CPTII,S$GLB,,

## 2022-04-08 PROCEDURE — 99214 OFFICE O/P EST MOD 30 MIN: CPT | Mod: S$GLB,,,

## 2022-04-08 PROCEDURE — 3074F SYST BP LT 130 MM HG: CPT | Mod: CPTII,S$GLB,,

## 2022-04-08 PROCEDURE — 3074F PR MOST RECENT SYSTOLIC BLOOD PRESSURE < 130 MM HG: ICD-10-PCS | Mod: CPTII,S$GLB,,

## 2022-04-08 PROCEDURE — 99999 PR PBB SHADOW E&M-EST. PATIENT-LVL V: ICD-10-PCS | Mod: PBBFAC,,,

## 2022-04-08 PROCEDURE — 3078F PR MOST RECENT DIASTOLIC BLOOD PRESSURE < 80 MM HG: ICD-10-PCS | Mod: CPTII,S$GLB,,

## 2022-04-08 RX ORDER — TRAZODONE HYDROCHLORIDE 50 MG/1
50 TABLET ORAL NIGHTLY PRN
Qty: 30 TABLET | Refills: 11 | Status: SHIPPED | OUTPATIENT
Start: 2022-04-08 | End: 2023-02-02 | Stop reason: SDUPTHER

## 2022-04-08 NOTE — PROGRESS NOTES
Subjective:       Patient ID: Tereza Weinstein is a 52 y.o. female.    Chief Complaint: Follow-up    Suzanne Weinstein is a 51 yo F pt w/ MHx listed below that presents to the clinic for routine follow up of anxiety and depression 2/2 grief. She states that she is doing fairly well today but does still appear sad. She is unsure if she wants to increase the pharmacotherapy. She recent saw the psychiatrist which went well. She is keeping a sleep journal at this time. She is scheduled for follow up with psych. Patient is attending work and doing daily activities. Patient's family is supportive.       Past Medical History:   Diagnosis Date    Kidney stone     PONV (postoperative nausea and vomiting)        Review of patient's allergies indicates:   Allergen Reactions    Penicillins      I was young unsure of reaction           Current Outpatient Medications:     busPIRone (BUSPAR) 5 MG Tab, Take 2 tablets (10 mg total) by mouth 3 (three) times daily as needed (anxiety)., Disp: 270 tablet, Rfl: 0    EScitalopram oxalate (LEXAPRO) 10 MG tablet, TAKE 1 TABLET(10 MG) BY MOUTH EVERY DAY, Disp: 90 tablet, Rfl: 0    hydroCHLOROthiazide (HYDRODIURIL) 25 MG tablet, Take 1 tablet (25 mg total) by mouth once daily., Disp: 30 tablet, Rfl: 11    hydroCHLOROthiazide (HYDRODIURIL) 25 MG tablet, Take 1 tablet (25 mg total) by mouth 2 (two) times daily. Take 1 tabley twice daily for 1 month and then decrease to daily, Disp: 60 tablet, Rfl: 0    potassium chloride SA (K-DUR,KLOR-CON) 10 MEQ tablet, Take 1 tablet (10 mEq total) by mouth once daily., Disp: 30 tablet, Rfl: 0    traZODone (DESYREL) 50 MG tablet, Take 1 tablet (50 mg total) by mouth nightly as needed for Insomnia., Disp: 30 tablet, Rfl: 11    Current Facility-Administered Medications:     acetaminophen tablet 650 mg, 650 mg, Oral, Once PRN, Loreta Cummins MD    albuterol inhaler 2 puff, 2 puff, Inhalation, Q20 Min PRN, Loreta Cummins MD    diphenhydrAMINE injection 25  "mg, 25 mg, Intravenous, Once PRNLoreta MD    EPINEPHrine (EPIPEN) 0.3 mg/0.3 mL pen injection 0.3 mg, 0.3 mg, Intramuscular, PRNLoreta MD    methylPREDNISolone sodium succinate injection 40 mg, 40 mg, Intravenous, Once PRNLoreta MD    ondansetron disintegrating tablet 4 mg, 4 mg, Oral, Once PRNLoreta MD    sodium chloride 0.9% 500 mL flush bag, , Intravenous, PRNLoreta MD, Stopped at 09/07/21 1150    sodium chloride 0.9% flush 10 mL, 10 mL, Intravenous, PRNLoreta MD    Review of Systems   Constitutional: Positive for unexpected weight change. Negative for activity change, appetite change, chills, diaphoresis, fatigue and fever.   HENT: Negative for congestion, ear pain, postnasal drip, rhinorrhea, sinus pressure, sneezing, sore throat and trouble swallowing.    Eyes: Negative for pain, itching and visual disturbance.   Respiratory: Negative for cough, chest tightness, shortness of breath and wheezing.    Cardiovascular: Negative for chest pain, palpitations and leg swelling.   Gastrointestinal: Negative for abdominal distention, abdominal pain, blood in stool, constipation, diarrhea, nausea and vomiting.   Endocrine: Negative for cold intolerance and heat intolerance.   Genitourinary: Negative for difficulty urinating, dysuria, frequency, hematuria and urgency.   Musculoskeletal: Negative for arthralgias, back pain, myalgias and neck pain.   Skin: Negative for color change, pallor, rash and wound.   Neurological: Negative for dizziness, syncope, weakness, numbness and headaches.   Hematological: Negative for adenopathy.   Psychiatric/Behavioral: Positive for dysphoric mood and sleep disturbance. Negative for behavioral problems. The patient is not nervous/anxious.        Objective:      /64 (BP Location: Right arm, Patient Position: Sitting, BP Method: Medium (Manual))   Pulse 76   Temp 97.9 °F (36.6 °C) (Oral)   Resp 16   Ht 5' 9" (1.753 m)   Wt 90.1 " kg (198 lb 10.2 oz)   SpO2 97%   BMI 29.33 kg/m²   Physical Exam  Vitals reviewed.   Constitutional:       General: She is not in acute distress.     Appearance: Normal appearance. She is normal weight. She is not ill-appearing, toxic-appearing or diaphoretic.   HENT:      Head: Normocephalic.      Right Ear: External ear normal.      Left Ear: External ear normal.      Nose: Nose normal. No congestion or rhinorrhea.      Mouth/Throat:      Mouth: Mucous membranes are moist.      Pharynx: Oropharynx is clear.   Eyes:      General: No scleral icterus.        Right eye: No discharge.         Left eye: No discharge.      Extraocular Movements: Extraocular movements intact.      Conjunctiva/sclera: Conjunctivae normal.   Cardiovascular:      Rate and Rhythm: Normal rate and regular rhythm.      Pulses: Normal pulses.      Heart sounds: Normal heart sounds. No murmur heard.    No friction rub. No gallop.   Pulmonary:      Effort: Pulmonary effort is normal. No respiratory distress.      Breath sounds: Normal breath sounds. No wheezing, rhonchi or rales.   Chest:      Chest wall: No tenderness.   Abdominal:      General: There is no distension.      Palpations: Abdomen is soft. There is no mass.      Tenderness: There is no abdominal tenderness. There is no guarding.   Musculoskeletal:         General: No swelling, tenderness or deformity. Normal range of motion.      Cervical back: Normal range of motion.      Right lower leg: No edema.      Left lower leg: No edema.   Skin:     General: Skin is warm and dry.      Capillary Refill: Capillary refill takes less than 2 seconds.      Coloration: Skin is not jaundiced.      Findings: No bruising, erythema, lesion or rash.   Neurological:      Mental Status: She is alert and oriented to person, place, and time.   Psychiatric:         Behavior: Behavior normal.         Thought Content: Thought content normal.         Judgment: Judgment normal.      Comments: Tearful            Assessment:       1. Anxiety and depression    2. Insomnia, unspecified type    3. Grief    4. Hyperparathyroidism    5. Serum calcium elevated        Plan:       Anxiety and depression        -    Stable. Continue meds as prescribed. Instructed pt to contact clinic if she feels like she needs an adjustment in dose.     Insomnia, unspecified type  -     traZODone (DESYREL) 50 MG tablet; Take 1 tablet (50 mg total) by mouth nightly as needed for Insomnia.  Dispense: 30 tablet; Refill: 11    Grief        -    Stable. Continue meds as prescribed. Instructed pt to contact clinic if she feels like she needs an adjustment in dose.        Hyperparathyroidism        -    Seeing endo. Will keep close follow up.     Serum calcium elevated        -    Seeing endo. Will keep close follow up.         Patient to follow up in 3 months to establish w/ Dr. Herron. Patient seeing endo in regards to elevated PTH and higher end of normal calcium levels. No recent total protein level.        Chaparro Bansal PA-C  Family Medicine Physician Assistant       I spent a total of 20 minutes on the day of the visit.This includes face to face time and non-face to face time preparing to see the patient (eg, review of tests), obtaining and/or reviewing separately obtained history, documenting clinical information in the electronic or other health record, independently interpreting results and communicating results to the patient/family/caregiver, or care coordinator.      We have addressed [4] Moderate: 1 or more chronic illnesses with exacerbation, progression, or side effects of treatment / 2 or more stable chronic illnesses / 1 undiagnosed new problem with uncertain prognosis / 1 acute illness with systemic symptoms / 1 acute complicated injury  The complexity of the data reviewed and analyzed for this visit was [2] Minimal or None  The risk of complications and/or morbidity or mortality are [4] Moderate risk (I.e. prescription drug  management / decision regarding minor surgery with identified pt or procedure risk factors / decision regarding elective major surgery without identified pt or procedure risk factors / diagnosis or treatment significantly limited by social determinants of health)   The level of Medical Decision Making for this visit is [4] Moderate

## 2022-04-08 NOTE — PATIENT INSTRUCTIONS
Josue Starks,     If you are due for any health screening(s) below please notify me so we can arrange them to be ordered and scheduled to maintain your health. Most healthy patients complete it. Don't lose out on improving your health.     Health Maintenance   Topic Date Due    Hepatitis C Screening  Never done    Lipid Panel  Never done    TETANUS VACCINE  Never done    Mammogram  05/22/2021       Breast Cancer Screening    Breast cancer is the second most common cancer in women after skin cancer, and the second leading cause of death from cancer after lung cancer. Mammograms can detect breast cancer early, which significantly increases the chances of curing the cancer.      A screening mammogram is an x-ray image of the breasts used for early breast cancer detection. It can help reduce the number of deaths from breast cancer among women. To get a clear image, the breast is placed between two plastic plates to make it flat. How often a mammogram is needed depends on your age and your breast cancer risk.    Although you are still overdue for this important screening, due to the COVID-19 pandemic, we recommend scheduling it in the near future.  Colon Cancer Screening    Of cancers affecting both men and women, colorectal cancer is the third leading cancer killer in the [place]. But it doesnt have to be. Screening can prevent colorectal cancer or find it at an early stage when treatment often leads to a cure.    A colonoscopy is the preferred test for detecting colon cancer. It is needed only once every 10 years if results are negative. While sedated, a flexible, lighted tube with a tiny camera is inserted into the rectum and advanced through the colon to look for cancers. An alternative screening test that is used at home and returned to the lab may also be used. It detects hidden blood in bowel movements which could indicate cancer in the colon. If results are positive, you will need a colonoscopy to determine if  the blood is a sign of cancer. This type of follow up (diagnostic) colonoscopy usually requires additional copays as required by your insurance provider. Please contact your PCP if you have any questions.    Although you are still overdue for this important screening, due to the COVID-19 pandemic, we recommend scheduling it in the near future.

## 2022-04-11 ENCOUNTER — HISTORICAL (OUTPATIENT)
Dept: ADMINISTRATIVE | Facility: HOSPITAL | Age: 52
End: 2022-04-11

## 2022-04-19 ENCOUNTER — OFFICE VISIT (OUTPATIENT)
Dept: PSYCHIATRY | Facility: CLINIC | Age: 52
End: 2022-04-19
Payer: COMMERCIAL

## 2022-04-19 DIAGNOSIS — F43.23 ADJUSTMENT DISORDER WITH MIXED ANXIETY AND DEPRESSED MOOD: Primary | ICD-10-CM

## 2022-04-19 PROCEDURE — 99499 NO LOS: ICD-10-PCS | Mod: S$GLB,,, | Performed by: CASE MANAGER/CARE COORDINATOR

## 2022-04-19 PROCEDURE — 99499 UNLISTED E&M SERVICE: CPT | Mod: S$GLB,,, | Performed by: CASE MANAGER/CARE COORDINATOR

## 2022-04-19 NOTE — PROGRESS NOTES
Patient Name: Tereza Weinstein  Date:  4/19/2022   Site:  [facility]      Patient arrived about an hour late for scheduled therapy session. She stated that she thought the session was an hour later. Patient appeared somewhat anxious and wanted to show writer that she had completed her sleep journal. She appeared appropriately dressed and groomed. Patient was rescheduled for follow-up on 5/2/2022. This appeared to help relax patient.    Time with provider: 5 minutes      [place] [place] License #[id number]

## 2022-04-21 ENCOUNTER — PATIENT MESSAGE (OUTPATIENT)
Dept: ENDOCRINOLOGY | Facility: CLINIC | Age: 52
End: 2022-04-21
Payer: COMMERCIAL

## 2022-04-21 ENCOUNTER — LAB VISIT (OUTPATIENT)
Dept: LAB | Facility: HOSPITAL | Age: 52
End: 2022-04-21
Attending: STUDENT IN AN ORGANIZED HEALTH CARE EDUCATION/TRAINING PROGRAM
Payer: COMMERCIAL

## 2022-04-21 DIAGNOSIS — E21.0 PRIMARY HYPERPARATHYROIDISM: ICD-10-CM

## 2022-04-21 LAB
ALBUMIN SERPL BCP-MCNC: 3.3 G/DL (ref 3.5–5.2)
ANION GAP SERPL CALC-SCNC: 9 MMOL/L (ref 8–16)
BUN SERPL-MCNC: 10 MG/DL (ref 6–20)
CALCIUM SERPL-MCNC: 9.3 MG/DL (ref 8.7–10.5)
CHLORIDE SERPL-SCNC: 107 MMOL/L (ref 95–110)
CO2 SERPL-SCNC: 24 MMOL/L (ref 23–29)
CREAT SERPL-MCNC: 0.7 MG/DL (ref 0.5–1.4)
EST. GFR  (AFRICAN AMERICAN): >60 ML/MIN/1.73 M^2
EST. GFR  (NON AFRICAN AMERICAN): >60 ML/MIN/1.73 M^2
GLUCOSE SERPL-MCNC: 157 MG/DL (ref 70–110)
PHOSPHATE SERPL-MCNC: 2.7 MG/DL (ref 2.7–4.5)
POTASSIUM SERPL-SCNC: 4.3 MMOL/L (ref 3.5–5.1)
PTH-INTACT SERPL-MCNC: 128.6 PG/ML (ref 9–77)
SODIUM SERPL-SCNC: 140 MMOL/L (ref 136–145)

## 2022-04-21 PROCEDURE — 36415 COLL VENOUS BLD VENIPUNCTURE: CPT | Performed by: STUDENT IN AN ORGANIZED HEALTH CARE EDUCATION/TRAINING PROGRAM

## 2022-04-21 PROCEDURE — 83970 ASSAY OF PARATHORMONE: CPT | Performed by: STUDENT IN AN ORGANIZED HEALTH CARE EDUCATION/TRAINING PROGRAM

## 2022-04-21 PROCEDURE — 80069 RENAL FUNCTION PANEL: CPT | Performed by: STUDENT IN AN ORGANIZED HEALTH CARE EDUCATION/TRAINING PROGRAM

## 2022-04-22 DIAGNOSIS — E21.3 HYPERPARATHYROIDISM: Primary | ICD-10-CM

## 2022-04-22 NOTE — PROGRESS NOTES
PHPT vs IH  HCTZ challenge, however was already taking HCTZ 25 daily, we increased 50  Calcium mostly unchanged (not elevated), PTH unchanged  Will verify that she was taking the increased HCTZ 25 mg BID x 1 month, continue the medication and obtain a 24 hour urine calcium to determine if adequate dosing  Pending thyroid US for completeness due to unclear picture   Suspect IH, will likely benefit from HCTZ long term    Will continue HCTZ 25 mg bid and obtain a 24 hour urine calcium

## 2022-04-29 VITALS
SYSTOLIC BLOOD PRESSURE: 118 MMHG | BODY MASS INDEX: 28.44 KG/M2 | DIASTOLIC BLOOD PRESSURE: 71 MMHG | WEIGHT: 192 LBS | HEIGHT: 69 IN

## 2022-05-01 ENCOUNTER — LAB VISIT (OUTPATIENT)
Dept: LAB | Facility: HOSPITAL | Age: 52
End: 2022-05-01
Attending: CASE MANAGER/CARE COORDINATOR
Payer: COMMERCIAL

## 2022-05-01 DIAGNOSIS — E21.3 HYPERPARATHYROIDISM: ICD-10-CM

## 2022-05-01 LAB
CALCIUM 24H UR-MRATE: 10 MG/HR (ref 4–12)
CALCIUM UR-MCNC: 11.8 MG/DL (ref 0–15)
CALCIUM URINE (MG/SPEC): 233 MG/SPEC
CREAT 24H UR-MRATE: 46.2 MG/HR (ref 40–75)
CREAT UR-MCNC: 56.2 MG/DL (ref 15–325)
CREATININE, URINE (MG/SPEC): 1110 MG/SPEC
URINE COLLECTION DURATION: 24 HR
URINE COLLECTION DURATION: 24 HR
URINE VOLUME: 1975 ML
URINE VOLUME: 1975 ML

## 2022-05-01 PROCEDURE — 82570 ASSAY OF URINE CREATININE: CPT | Performed by: STUDENT IN AN ORGANIZED HEALTH CARE EDUCATION/TRAINING PROGRAM

## 2022-05-01 PROCEDURE — 82340 ASSAY OF CALCIUM IN URINE: CPT | Performed by: STUDENT IN AN ORGANIZED HEALTH CARE EDUCATION/TRAINING PROGRAM

## 2022-05-02 ENCOUNTER — OFFICE VISIT (OUTPATIENT)
Dept: PSYCHIATRY | Facility: CLINIC | Age: 52
End: 2022-05-02
Payer: COMMERCIAL

## 2022-05-02 DIAGNOSIS — F43.21 COMPLICATED GRIEF: ICD-10-CM

## 2022-05-02 DIAGNOSIS — F43.23 ADJUSTMENT DISORDER WITH MIXED ANXIETY AND DEPRESSED MOOD: Primary | ICD-10-CM

## 2022-05-02 PROCEDURE — 90837 PSYTX W PT 60 MINUTES: CPT | Mod: S$GLB,,, | Performed by: CASE MANAGER/CARE COORDINATOR

## 2022-05-02 PROCEDURE — 90837 PR PSYCHOTHERAPY W/PATIENT, 60 MIN: ICD-10-PCS | Mod: S$GLB,,, | Performed by: CASE MANAGER/CARE COORDINATOR

## 2022-05-02 NOTE — PROGRESS NOTES
Primary Care Behavioral Health: Follow-up  Patient Name: Tereza Weinstein  Date:  5/2/2022   Site:  [facility]  Referral source: Chaparro Bansal PA-C    Chief complaint/reason for encounter:  Grief and Anxiety    History of present illness:  Ms. Tereza Weinstein is a 52 y.o. Not  or /a female referred by Chaparro Bansal PA-C.  Patient was seen, examined and chart was reviewed.  Tereza Weinstein reviewed and agreed to informed consent and the limits of confidentiality. Patient completed the sleep journal from previous session. It noted sleep onset and maintenance difficulties and fluctuated in her amount of hours asleep. She shared that she found out Saturday that her half sister has stage IV Cancer and that it is terminal. Patient noted that she has been talking to her half sister on the phone and that she is thinking about if she should visit her in [place]. Patient reported that she feels like she is slowly progressing and she is nervous that visiting her half sister would make her feel more saddened again. Patient did note that she has started a garden.      Past Medical History:   Diagnosis Date    Kidney stone     PONV (postoperative nausea and vomiting)          Current Outpatient Medications:     busPIRone (BUSPAR) 5 MG Tab, Take 2 tablets (10 mg total) by mouth 3 (three) times daily as needed (anxiety)., Disp: 270 tablet, Rfl: 0    EScitalopram oxalate (LEXAPRO) 10 MG tablet, TAKE 1 TABLET(10 MG) BY MOUTH EVERY DAY, Disp: 90 tablet, Rfl: 0    hydroCHLOROthiazide (HYDRODIURIL) 25 MG tablet, Take 1 tablet (25 mg total) by mouth once daily., Disp: 30 tablet, Rfl: 11    hydroCHLOROthiazide (HYDRODIURIL) 25 MG tablet, Take 1 tablet (25 mg total) by mouth 2 (two) times daily. Take 1 tabley twice daily for 1 month and then decrease to daily, Disp: 60 tablet, Rfl: 0    potassium chloride SA (K-DUR,KLOR-CON) 10 MEQ tablet, Take 1 tablet (10 mEq total) by mouth once daily., Disp: 30  tablet, Rfl: 0    traZODone (DESYREL) 50 MG tablet, Take 1 tablet (50 mg total) by mouth nightly as needed for Insomnia., Disp: 30 tablet, Rfl: 11    Current Facility-Administered Medications:     acetaminophen tablet 650 mg, 650 mg, Oral, Once PRN, Loreta Cummins MD    albuterol inhaler 2 puff, 2 puff, Inhalation, Q20 Min PRN, Loreta Cummins MD    diphenhydrAMINE injection 25 mg, 25 mg, Intravenous, Once PRN, Loreta Cummins MD    EPINEPHrine (EPIPEN) 0.3 mg/0.3 mL pen injection 0.3 mg, 0.3 mg, Intramuscular, PRN, Loreta Cummins MD    methylPREDNISolone sodium succinate injection 40 mg, 40 mg, Intravenous, Once PRN, Loreta Cummins MD    ondansetron disintegrating tablet 4 mg, 4 mg, Oral, Once PRN, Loreta Cummins MD    sodium chloride 0.9% 500 mL flush bag, , Intravenous, PRN, Loreta Cummins MD, Stopped at 09/07/21 1150    sodium chloride 0.9% flush 10 mL, 10 mL, Intravenous, PRNLoreta MD    Psychiatric symptoms:  Depression:  Reported diminished mood for at least half of the day two days in the past two weeks; some increase in concentration skills; crying spells; She denied suicidal ideation.  Aye/Hypomania:  Some high energy that may be due to a gland issue of some kind. Stated it started before sister's death and that she has talked with medical prescriber about it.  Anxiety:  Shakiness feeling when anxious  Thoughts:  Denied delusions, hallucinations.  Suicidal thoughts/behaviors:  Patient denied suicidal and homicidal ideation, plan and intent.  Patient noted agreement to call 911/and or present to the ED if she experienced suicidal or homicidal ideation, plan or intent.    Self-injury:  Denied.  Sleep: Completed sleep journal and reported sleep quality fluctuates; sleep onset and maintenance difficulties; noted sleeping longer recently due to not wanting to get out of bed      Mental Status Exam:  General appearance:  appears stated age, neatly dressed, well groomed  Speech:  normal rate, normal tone,  normal pitch, normal volume  Level of cooperation:  cooperative  Thought processes:  logical, goal-directed  Mood:  Euthymic and somewhat anxious  Thought content:  no illusions, no visual hallucinations, no auditory hallucinations, no delusions, no active or passive homicidal thoughts, no active or passive suicidal ideation, no obsessions, no compulsions, no violence  Affect:  Appropriate and appeared sad while discussing her sister's death as evidenced by her tearing up at those points of the session  Orientation:  oriented to person, place, situation and date  Memory/Attention and Concentration:  No gross deficits made evident during conversation.  Judgment and insight: fair  Language:  intact    Over the last 2 weeks, how often have you been bothered by any of the following problems?  Little interest or pleasure in doing things: Several days  Feeling down, depressed, or hopeless: Several days  Trouble falling or staying asleep, or sleeping too much: More than half the days  Feeling tired or having little energy: Several days  Poor appetite or overeating: Several days  Feeling bad about yourself - or that you are a failure or have let yourself or your family down: Not at all  Trouble concentrating on things, such as reading the newspaper or watching television: Several days  Moving or speaking so slowly that other people could have noticed. Or the opposite - being so fidgety or restless that you have been moving around a lot more than usual: Not at all  Thoughts that you would be better off dead, or of hurting yourself in some way: Not at all  PHQ-9 Total Score: 7  If you checked off any problems, how difficult have these problems made it for you to do your work, take care of things at home, or get along with other people?: Not difficult at all     Past PHQ-9 Score = 3 (3/28/2022), 9 (2/22/2022)      GAD7 5/2/2022 3/28/2022 2/22/2022   1. Feeling nervous, anxious, or on edge? 0 2 2   2. Not being able to stop or  control worrying? 1 2 1   3. Worrying too much about different things? 1 3 1   4. Trouble relaxing? 1 1 1   5. Being so restless that it is hard to sit still? 1 1 1   6. Becoming easily annoyed or irritable? 0 0 0   7. Feeling afraid as if something awful might happen? 0 0 1   8. If you checked off any problems, how difficult have these problems made it for you to do your work, take care of things at home, or get along with other people? - 0 -   FIFI-7 Score 4 9 7       Impression:    Patient shared that she sees herself improving as she feels like she can think clearer at times. Patient noted that she still cries often. Patient stated that she prays to God often and has asked to see her sister in a dream, but has not yet. Patient shared that she feels like her sister has sent her messages that she is there but patient still wants to see her in a dream to help cope. Patient noted that her mother has had a dream of her sister since her sister's death. Discussed increasing her pleasant event scheduling. Patient shared that she wants to go play bingo on a Friday night but noted that she does not feel ready yet. Discussed with patient when she will know that she is ready and patient did not have an answer for that. Patient agreed to attempt to go play Bingo one week and that she would go back home after a few minutes if she does not feel up to it. Patient was advised to write down negative thoughts and bring to next session. Patient then brought up how another sister has been causing family difficulties. Healthy boundaries was discussed. Patient stated that she plans on continuing to keep healthy boundaries with this sister so patient can grief in a healthy way the death of her other sister. Patient appears to be hopeful that she will feel more positive with time.      Diagnosis:    1. Adjustment disorder with mixed anxiety and depressed mood     2. Complicated grief          Plan:      1) Increase Pleasant Event  Scheduling  2) Increase Sleep Hygiene  3) Write down negative thoughts and challenge in future session  4) Follow-up in one month    Length of Appointment: 55 minutes        [facility] Le License #[id number]

## 2022-05-12 ENCOUNTER — PATIENT MESSAGE (OUTPATIENT)
Dept: SMOKING CESSATION | Facility: CLINIC | Age: 52
End: 2022-05-12
Payer: COMMERCIAL

## 2022-05-25 ENCOUNTER — HOSPITAL ENCOUNTER (OUTPATIENT)
Dept: ENDOCRINOLOGY | Facility: CLINIC | Age: 52
Discharge: HOME | End: 2022-05-25
Attending: STUDENT IN AN ORGANIZED HEALTH CARE EDUCATION/TRAINING PROGRAM
Payer: COMMERCIAL

## 2022-05-25 ENCOUNTER — PATIENT MESSAGE (OUTPATIENT)
Dept: ENDOCRINOLOGY | Facility: HOSPITAL | Age: 52
End: 2022-05-25
Payer: COMMERCIAL

## 2022-05-25 DIAGNOSIS — E21.0 PRIMARY HYPERPARATHYROIDISM: ICD-10-CM

## 2022-05-25 PROCEDURE — 76536 US EXAM OF HEAD AND NECK: CPT | Mod: S$GLB,,, | Performed by: INTERNAL MEDICINE

## 2022-05-25 PROCEDURE — 76536 US SOFT TISSUE HEAD NECK THYROID: ICD-10-PCS | Mod: S$GLB,,, | Performed by: INTERNAL MEDICINE

## 2022-05-31 ENCOUNTER — TELEPHONE (OUTPATIENT)
Dept: PSYCHIATRY | Facility: CLINIC | Age: 52
End: 2022-05-31
Payer: COMMERCIAL

## 2022-05-31 ENCOUNTER — OFFICE VISIT (OUTPATIENT)
Dept: PSYCHIATRY | Facility: CLINIC | Age: 52
End: 2022-05-31
Payer: COMMERCIAL

## 2022-05-31 DIAGNOSIS — F43.23 ADJUSTMENT DISORDER WITH MIXED ANXIETY AND DEPRESSED MOOD: Primary | ICD-10-CM

## 2022-05-31 PROCEDURE — 90837 PSYTX W PT 60 MINUTES: CPT | Mod: 95,,, | Performed by: CASE MANAGER/CARE COORDINATOR

## 2022-05-31 PROCEDURE — 90837 PR PSYCHOTHERAPY W/PATIENT, 60 MIN: ICD-10-PCS | Mod: 95,,, | Performed by: CASE MANAGER/CARE COORDINATOR

## 2022-05-31 NOTE — PROGRESS NOTES
"Primary Care Behavioral Health: Follow-up  Patient Name: Tereza Weinstein  Date:  2022   Site:  [facility]  Referral source: Chaparro Bansal PA-C    Chief complaint/reason for encounter:  Grief and Anxiety    The patient location is:  at work at [employer] [email or web address] in [place], [place]  The patient location Parish is: [facility]  The patient phone number is: [phone]  Visit type: Virtual visit with synchronous audio and video  Each patient to whom he or she provides medical services by telemedicine is:  (1) informed of the relationship between the provider and patient and the respective role of any other health care provider with respect to management of the patient; and (2) notified that he or she may decline to receive medical services by telemedicine and may withdraw from such care at any time.    History of present illness:  Ms. Tereza Weinstein is a 52 y.o. Not  or /a female referred by Chaparro Bansal PA-C.  Patient was seen, examined and chart was reviewed.  Tereza Weinstein reviewed and agreed to informed consent and the limits of confidentiality. Patient reportedly felt "good" today. Patient shared that her half-sister who was diagnosed with Cancer has passed away. She noted that they had a good conversation over the phone the last time they spoke and that she is grieving effectively. Patient noted that she is still having difficulties grieving the death of her sister because they were close. Patient reported that she has been focusing on yard work recently and that anxiety has been greater at night when going to sleep. Patient shared that she has felt closer to certain members of her family since her sister  and has good familial support.      Past Medical History:   Diagnosis Date    Kidney stone     PONV (postoperative nausea and vomiting)          Current Outpatient Medications:     busPIRone (BUSPAR) 5 MG Tab, Take 2 tablets (10 mg total) by " mouth 3 (three) times daily as needed (anxiety)., Disp: 270 tablet, Rfl: 0    EScitalopram oxalate (LEXAPRO) 10 MG tablet, TAKE 1 TABLET(10 MG) BY MOUTH EVERY DAY, Disp: 90 tablet, Rfl: 0    hydroCHLOROthiazide (HYDRODIURIL) 25 MG tablet, Take 1 tablet (25 mg total) by mouth 2 (two) times daily. Take 1 tabley twice daily for 1 month and then decrease to daily, Disp: 60 tablet, Rfl: 0    potassium chloride SA (K-DUR,KLOR-CON) 10 MEQ tablet, Take 1 tablet (10 mEq total) by mouth once daily., Disp: 30 tablet, Rfl: 0    traZODone (DESYREL) 50 MG tablet, Take 1 tablet (50 mg total) by mouth nightly as needed for Insomnia., Disp: 30 tablet, Rfl: 11    Current Facility-Administered Medications:     acetaminophen tablet 650 mg, 650 mg, Oral, Once PRN, Loreta Cummins MD    albuterol inhaler 2 puff, 2 puff, Inhalation, Q20 Min PRN, Loreta Cummins MD    diphenhydrAMINE injection 25 mg, 25 mg, Intravenous, Once PRN, Loreta Cummins MD    EPINEPHrine (EPIPEN) 0.3 mg/0.3 mL pen injection 0.3 mg, 0.3 mg, Intramuscular, PRN, Loreta Cummins MD    methylPREDNISolone sodium succinate injection 40 mg, 40 mg, Intravenous, Once PRN, Loreta Cummins MD    ondansetron disintegrating tablet 4 mg, 4 mg, Oral, Once PRN, Loreta Cummins MD    sodium chloride 0.9% 500 mL flush bag, , Intravenous, PRN, Loreta Cummins MD, Stopped at 09/07/21 1150    sodium chloride 0.9% flush 10 mL, 10 mL, Intravenous, PRNLoreta MD    Psychiatric symptoms:  Depression:  Poor appetite four days in the last two weeks; difficulties concentrating. She denied suicidal ideation.  Aye/Hypomania:  Patient denied any symptoms of aye.  Anxiety:  Worried and racing thoughts mostly at night.  Thoughts:  Patient delusions, hallucinations.  Suicidal thoughts/behaviors:  Patient denied suicidal and homicidal ideation, plan and intent.  Patient noted agreement to call 911/and or present to the ED if she experienced suicidal or homicidal ideation, plan or intent.     Self-injury:  Patient denied.  Sleep: Difficulties falling asleep due to anxiety.      Mental Status Exam:  General appearance:  appears stated age, neatly dressed, well groomed  Speech:  normal rate, normal tone, normal pitch, normal volume  Level of cooperation:  cooperative  Thought processes:  logical, goal-directed  Mood:  Generally calm  Thought content:  no illusions, no visual hallucinations, no auditory hallucinations, no delusions, no active or passive homicidal thoughts, no active or passive suicidal ideation, no obsessions, no compulsions, no violence  Affect:  Appropriate and mood congruent  Orientation:  oriented to person, place, situation and date  Memory/Attention and Concentration:  No gross deficits made evident during conversation.  Judgment and insight: fair  Language:  intact    Over the last 2 weeks, how often have you been bothered by any of the following problems?  Little interest or pleasure in doing things: Not at all  Feeling down, depressed, or hopeless: Not at all  Trouble falling or staying asleep, or sleeping too much: Nearly every day  Feeling tired or having little energy: Not at all  Poor appetite or overeating: Several days  Feeling bad about yourself - or that you are a failure or have let yourself or your family down: Not at all  Trouble concentrating on things, such as reading the newspaper or watching television: Several days  Moving or speaking so slowly that other people could have noticed. Or the opposite - being so fidgety or restless that you have been moving around a lot more than usual: Not at all  Thoughts that you would be better off dead, or of hurting yourself in some way: Not at all  PHQ-9 Total Score: 5  If you checked off any problems, how difficult have these problems made it for you to do your work, take care of things at home, or get along with other people?: Not difficult at all     Past PHQ-9 Scores = 7 (5/2/2022), 3 (3/28/2022), 9 (2/22/2022)      GAD7  2022 2022 3/28/2022   1. Feeling nervous, anxious, or on edge? 1 0 2   2. Not being able to stop or control worrying? 3 1 2   3. Worrying too much about different things? 3 1 3   4. Trouble relaxing? 2 1 1   5. Being so restless that it is hard to sit still? 3 1 1   6. Becoming easily annoyed or irritable? 0 0 0   7. Feeling afraid as if something awful might happen? 1 0 0   8. If you checked off any problems, how difficult have these problems made it for you to do your work, take care of things at home, or get along with other people? 0 - 0   FIFI-7 Score 13 4 9       Impression:    Patient shared that she has been productive during the day and sees it as helpful in managing her mood. Patient also shared that she has been effective at challenging negative thoughts on her own. Patient reported she often has worried thoughts at night. Discussed with patient writing down worried thoughts on a piece of paper as a way to get them out of her head. Patient was also recommended to focus on positive thoughts at night. Patient stated that she would think about her grandchildren. Patient shared that since last session, many of her negative thoughts were focused on her other sister who she does not get along with. Discussed with patient setting appropriate and healthy boundaries. Patient stated she has been stopping her mother from discussing this sister around her. Patient stated she thinks she would feel better if she dreamed of her sister who is . Patient stated she does not dream much. This may be due to patient not getting enough sleep at night. Increasing her sleep quality could help increase the chances that she has any kind of dreams as she would get more REM sleep.      Diagnosis:    1. Adjustment disorder with mixed anxiety and depressed mood          Plan:      1) Increase Sleep Hygiene  2) Write down worried thoughts at night and focus on positive thoughts  3) Continue setting appropriate and  healthy boundaries  4) Follow-up in one month    Length of Appointment: 55 minutes        [facility] License #[id number]

## 2022-07-13 ENCOUNTER — HOSPITAL ENCOUNTER (OUTPATIENT)
Dept: RADIOLOGY | Facility: HOSPITAL | Age: 52
Discharge: HOME | End: 2022-07-13
Attending: STUDENT IN AN ORGANIZED HEALTH CARE EDUCATION/TRAINING PROGRAM
Payer: COMMERCIAL

## 2022-07-13 ENCOUNTER — OFFICE VISIT (OUTPATIENT)
Dept: FAMILY MEDICINE | Facility: CLINIC | Age: 52
End: 2022-07-13
Payer: COMMERCIAL

## 2022-07-13 VITALS
BODY MASS INDEX: 28.71 KG/M2 | WEIGHT: 193.81 LBS | DIASTOLIC BLOOD PRESSURE: 70 MMHG | RESPIRATION RATE: 18 BRPM | SYSTOLIC BLOOD PRESSURE: 110 MMHG | HEIGHT: 69 IN | OXYGEN SATURATION: 96 % | HEART RATE: 87 BPM

## 2022-07-13 DIAGNOSIS — Z00.00 ROUTINE GENERAL MEDICAL EXAMINATION AT A HEALTH CARE FACILITY: Primary | ICD-10-CM

## 2022-07-13 DIAGNOSIS — Z12.31 ENCOUNTER FOR SCREENING MAMMOGRAM FOR MALIGNANT NEOPLASM OF BREAST: ICD-10-CM

## 2022-07-13 DIAGNOSIS — M76.60 INSERTIONAL ACHILLES TENDINOPATHY: ICD-10-CM

## 2022-07-13 DIAGNOSIS — Z12.11 ENCOUNTER FOR SCREENING FOR MALIGNANT NEOPLASM OF COLON: ICD-10-CM

## 2022-07-13 DIAGNOSIS — Z72.0 TOBACCO USE: ICD-10-CM

## 2022-07-13 PROCEDURE — 3078F DIAST BP <80 MM HG: CPT | Mod: CPTII,S$GLB,, | Performed by: STUDENT IN AN ORGANIZED HEALTH CARE EDUCATION/TRAINING PROGRAM

## 2022-07-13 PROCEDURE — 73610 X-RAY EXAM OF ANKLE: CPT | Mod: 26,RT,, | Performed by: RADIOLOGY

## 2022-07-13 PROCEDURE — 3074F SYST BP LT 130 MM HG: CPT | Mod: CPTII,S$GLB,, | Performed by: STUDENT IN AN ORGANIZED HEALTH CARE EDUCATION/TRAINING PROGRAM

## 2022-07-13 PROCEDURE — 3008F PR BODY MASS INDEX (BMI) DOCUMENTED: ICD-10-PCS | Mod: CPTII,S$GLB,, | Performed by: STUDENT IN AN ORGANIZED HEALTH CARE EDUCATION/TRAINING PROGRAM

## 2022-07-13 PROCEDURE — 73610 X-RAY EXAM OF ANKLE: CPT | Mod: TC,FY,RT

## 2022-07-13 PROCEDURE — 1160F RVW MEDS BY RX/DR IN RCRD: CPT | Mod: CPTII,S$GLB,, | Performed by: STUDENT IN AN ORGANIZED HEALTH CARE EDUCATION/TRAINING PROGRAM

## 2022-07-13 PROCEDURE — 99999 PR PBB SHADOW E&M-EST. PATIENT-LVL V: ICD-10-PCS | Mod: PBBFAC,,, | Performed by: STUDENT IN AN ORGANIZED HEALTH CARE EDUCATION/TRAINING PROGRAM

## 2022-07-13 PROCEDURE — 99406 BEHAV CHNG SMOKING 3-10 MIN: CPT | Mod: S$GLB,,, | Performed by: STUDENT IN AN ORGANIZED HEALTH CARE EDUCATION/TRAINING PROGRAM

## 2022-07-13 PROCEDURE — 3008F BODY MASS INDEX DOCD: CPT | Mod: CPTII,S$GLB,, | Performed by: STUDENT IN AN ORGANIZED HEALTH CARE EDUCATION/TRAINING PROGRAM

## 2022-07-13 PROCEDURE — 99406 PR TOBACCO USE CESSATION INTERMEDIATE 3-10 MINUTES: ICD-10-PCS | Mod: S$GLB,,, | Performed by: STUDENT IN AN ORGANIZED HEALTH CARE EDUCATION/TRAINING PROGRAM

## 2022-07-13 PROCEDURE — 3078F PR MOST RECENT DIASTOLIC BLOOD PRESSURE < 80 MM HG: ICD-10-PCS | Mod: CPTII,S$GLB,, | Performed by: STUDENT IN AN ORGANIZED HEALTH CARE EDUCATION/TRAINING PROGRAM

## 2022-07-13 PROCEDURE — 99396 PR PREVENTIVE VISIT,EST,40-64: ICD-10-PCS | Mod: 25,S$GLB,, | Performed by: STUDENT IN AN ORGANIZED HEALTH CARE EDUCATION/TRAINING PROGRAM

## 2022-07-13 PROCEDURE — 73610 XR ANKLE COMPLETE 3 VIEW RIGHT: ICD-10-PCS | Mod: 26,RT,, | Performed by: RADIOLOGY

## 2022-07-13 PROCEDURE — 99396 PREV VISIT EST AGE 40-64: CPT | Mod: 25,S$GLB,, | Performed by: STUDENT IN AN ORGANIZED HEALTH CARE EDUCATION/TRAINING PROGRAM

## 2022-07-13 PROCEDURE — 1159F MED LIST DOCD IN RCRD: CPT | Mod: CPTII,S$GLB,, | Performed by: STUDENT IN AN ORGANIZED HEALTH CARE EDUCATION/TRAINING PROGRAM

## 2022-07-13 PROCEDURE — 1159F PR MEDICATION LIST DOCUMENTED IN MEDICAL RECORD: ICD-10-PCS | Mod: CPTII,S$GLB,, | Performed by: STUDENT IN AN ORGANIZED HEALTH CARE EDUCATION/TRAINING PROGRAM

## 2022-07-13 PROCEDURE — 1160F PR REVIEW ALL MEDS BY PRESCRIBER/CLIN PHARMACIST DOCUMENTED: ICD-10-PCS | Mod: CPTII,S$GLB,, | Performed by: STUDENT IN AN ORGANIZED HEALTH CARE EDUCATION/TRAINING PROGRAM

## 2022-07-13 PROCEDURE — 3074F PR MOST RECENT SYSTOLIC BLOOD PRESSURE < 130 MM HG: ICD-10-PCS | Mod: CPTII,S$GLB,, | Performed by: STUDENT IN AN ORGANIZED HEALTH CARE EDUCATION/TRAINING PROGRAM

## 2022-07-13 PROCEDURE — 99999 PR PBB SHADOW E&M-EST. PATIENT-LVL V: CPT | Mod: PBBFAC,,, | Performed by: STUDENT IN AN ORGANIZED HEALTH CARE EDUCATION/TRAINING PROGRAM

## 2022-07-13 RX ORDER — PREDNISONE 10 MG/1
10 TABLET ORAL DAILY
Qty: 5 TABLET | Refills: 0 | Status: SHIPPED | OUTPATIENT
Start: 2022-07-13 | End: 2022-08-23

## 2022-07-13 NOTE — PROGRESS NOTES
[facility] CLINIC NOTE    Patient Name: Tereza Weinstein  YOB: 1970    PRESENTING HISTORY   Chief Complaint:   Chief Complaint   Patient presents with    [facility]        History of Present Illness:  Ms. Tereza Weinstein is a 52 y.o. female     Kidney stones 2/2    PMHx: Hyperparathyroidism    Following with endocrine    Social: tobacco- less than 1/2 PPD. Has quit before, didn't stay quit. Previously used red hots, chantix.    Not quite ready to quit yet.     [place] foot issues:Left foot dorsum with swelling. Intermittently painful.Chronic.    Right foot with swelling on achilles. New, very painful.                                                Review of Systems   All other systems reviewed and are negative.        PAST HISTORY:     Past Medical History:   Diagnosis Date    Kidney stone     PONV (postoperative nausea and vomiting)        Past Surgical History:   Procedure Laterality Date    ANTEGRADE NEPHROSTOGRAPHY Right 12/11/2020    Procedure: NEPHROSTOGRAM, ANTEGRADE;  Surgeon: Yuko Hawkins MD;  Location: [facility] OR;  Service: Urology;  Laterality: Right;    CYSTOSCOPY W/ URETERAL STENT PLACEMENT Right 11/24/2020    Procedure: CYSTOSCOPY, WITH URETERAL STENT INSERTION;  Surgeon: Yuko Hawkins MD;  Location: [facility] OR;  Service: Urology;  Laterality: Right;    CYSTOSCOPY W/ URETERAL STENT REMOVAL Right 12/23/2020    Procedure: CYSTOSCOPY, WITH URETERAL STENT REMOVAL;  Surgeon: Yuko Hawkins MD;  Location: [facility] OR;  Service: Urology;  Laterality: Right;    HYSTERECTOMY      PERCUTANEOUS NEPHROLITHOTRIPSY Right 12/11/2020    Procedure: NEPHROLITHOTRIPSY, PERCUTANEOUS;  Surgeon: Yuko Hawkins MD;  Location: [facility] OR;  Service: Urology;  Laterality: Right;    PERCUTANEOUS NEPHROSTOMY Right 12/10/2020    Procedure: Creation, Nephrostomy, Percutaneous;  Surgeon: Gold Diagnostic Provider;  Location: [facility] OR;  Service: General;  Laterality: Right;  "      History reviewed. No pertinent family history.    Social History     Socioeconomic History    Marital status: Single   Tobacco Use    Smoking status: Current Some Day Smoker     Packs/day: 0.50     Types: Cigarettes    Smokeless tobacco: Never Used   Substance and Sexual Activity    Alcohol use: No    Drug use: No       MEDICATIONS & ALLERGIES:     Current Outpatient Medications on File Prior to Visit   Medication Sig    busPIRone (BUSPAR) 5 MG Tab TAKE 1 TABLET(5 MG) BY MOUTH THREE TIMES DAILY AS NEEDED FOR ANXIETY    EScitalopram oxalate (LEXAPRO) 10 MG tablet TAKE 1 TABLET(10 MG) BY MOUTH EVERY DAY    hydroCHLOROthiazide (HYDRODIURIL) 25 MG tablet TAKE 1 TABLET(25 MG) BY MOUTH EVERY DAY    traZODone (DESYREL) 50 MG tablet Take 1 tablet (50 mg total) by mouth nightly as needed for Insomnia.    potassium chloride SA (K-DUR,KLOR-CON) 10 MEQ tablet TAKE 1 TABLET(10 MEQ) BY MOUTH EVERY DAY (Patient not taking: Reported on 7/13/2022)     Current Facility-Administered Medications on File Prior to Visit   Medication    acetaminophen tablet 650 mg    albuterol inhaler 2 puff    diphenhydrAMINE injection 25 mg    EPINEPHrine (EPIPEN) 0.3 mg/0.3 mL pen injection 0.3 mg    methylPREDNISolone sodium succinate injection 40 mg    ondansetron disintegrating tablet 4 mg    sodium chloride 0.9% 500 mL flush bag    sodium chloride 0.9% flush 10 mL       Review of patient's allergies indicates:   Allergen Reactions    Penicillins      I was young unsure of reaction         OBJECTIVE:   Vital Signs:  Vitals:    07/13/22 1611   BP: 110/70   Pulse: 87   Resp: 18   SpO2: 96%   Weight: 87.9 kg (193 lb 12.6 oz)   Height: 5' 9" (1.753 m)       No results found for this or any previous visit (from the past 24 hour(s)).      Physical Exam  Vitals and nursing note reviewed.   Constitutional:       General: She is not in acute distress.     Appearance: She is not toxic-appearing or diaphoretic.   HENT:      Head: " Normocephalic and atraumatic.      Right Ear: External ear normal.      Left Ear: External ear normal.   Eyes:      General: No scleral icterus.     Conjunctiva/sclera: Conjunctivae normal.      Pupils: Pupils are equal, round, and reactive to light.   Neck:      Thyroid: No thyromegaly.      Vascular: No carotid bruit.   Cardiovascular:      Rate and Rhythm: Normal rate and regular rhythm.      Heart sounds: Normal heart sounds. No murmur heard.  Pulmonary:      Effort: Pulmonary effort is normal. No respiratory distress.      Breath sounds: Normal breath sounds. No wheezing or rales.   Musculoskeletal:         General: No tenderness or deformity. Normal range of motion.      Cervical back: Normal range of motion and neck supple.      Right lower leg: No edema.      Left lower leg: No edema.      Comments: Cyst to dorsum of left foot. No tednerenss or erythema.   Tender nodule on right achilles. Function intact   Lymphadenopathy:      Cervical: No cervical adenopathy.   Skin:     General: Skin is warm and dry.      Findings: No erythema or rash.   Neurological:      Mental Status: She is alert and oriented to person, place, and time.      Gait: Gait is intact.   Psychiatric:         Mood and Affect: Mood and affect normal.         Cognition and Memory: Memory normal.         Judgment: Judgment normal.         ASSESSMENT & PLAN:     Routine general medical examination at a health care facility  -     Lipid Panel; Future; Expected date: 07/13/2022  -     Hepatitis C Antibody; Future; Expected date: 07/13/2022    Insertional Achilles tendinopathy  -     predniSONE (DELTASONE) 10 MG tablet; Take 1 tablet (10 mg total) by mouth once daily.  Dispense: 5 tablet; Refill: 0  -     X-Ray Ankle Complete Right; Future; Expected date: 07/13/2022  -     Ambulatory referral/consult to Podiatry; Future; Expected date: 07/20/2022    Encounter for screening mammogram for malignant neoplasm of breast  -     Mammo Digital Screening  [facility]; Future; Expected date: 07/13/2022    Encounter for screening for malignant neoplasm of colon  -     Case Request Endoscopy: COLONOSCOPY    Tobacco use  Discussed for 4 minutes.       Germán Herron MD   Internal Medicine    This note was created using Dragon voice recognition software that occasionally misinterprets phrases or words.

## 2022-07-14 ENCOUNTER — PATIENT MESSAGE (OUTPATIENT)
Dept: GASTROENTEROLOGY | Facility: CLINIC | Age: 52
End: 2022-07-14
Payer: COMMERCIAL

## 2022-07-25 ENCOUNTER — OFFICE VISIT (OUTPATIENT)
Dept: PODIATRY | Facility: CLINIC | Age: 52
End: 2022-07-25
Payer: COMMERCIAL

## 2022-07-25 ENCOUNTER — HOSPITAL ENCOUNTER (OUTPATIENT)
Dept: RADIOLOGY | Facility: CLINIC | Age: 52
Discharge: HOME | End: 2022-07-25
Attending: PODIATRIST
Payer: COMMERCIAL

## 2022-07-25 VITALS
HEIGHT: 69 IN | WEIGHT: 193.5 LBS | RESPIRATION RATE: 18 BRPM | HEART RATE: 80 BPM | OXYGEN SATURATION: 98 % | BODY MASS INDEX: 28.66 KG/M2

## 2022-07-25 DIAGNOSIS — M79.672 LEFT FOOT PAIN: ICD-10-CM

## 2022-07-25 DIAGNOSIS — M67.479 GANGLION CYST OF FOOT: ICD-10-CM

## 2022-07-25 DIAGNOSIS — M65.28 CALCIFIC ACHILLES TENDINITIS OF RIGHT LOWER EXTREMITY: ICD-10-CM

## 2022-07-25 DIAGNOSIS — M76.60 INSERTIONAL ACHILLES TENDINOPATHY: Primary | ICD-10-CM

## 2022-07-25 DIAGNOSIS — M25.571 ACUTE RIGHT ANKLE PAIN: ICD-10-CM

## 2022-07-25 PROCEDURE — 1159F PR MEDICATION LIST DOCUMENTED IN MEDICAL RECORD: ICD-10-PCS | Mod: CPTII,S$GLB,, | Performed by: PODIATRIST

## 2022-07-25 PROCEDURE — 99203 OFFICE O/P NEW LOW 30 MIN: CPT | Mod: S$GLB,,, | Performed by: PODIATRIST

## 2022-07-25 PROCEDURE — 73630 X-RAY EXAM OF FOOT: CPT | Mod: LT,S$GLB,, | Performed by: RADIOLOGY

## 2022-07-25 PROCEDURE — 1159F MED LIST DOCD IN RCRD: CPT | Mod: CPTII,S$GLB,, | Performed by: PODIATRIST

## 2022-07-25 PROCEDURE — 3008F PR BODY MASS INDEX (BMI) DOCUMENTED: ICD-10-PCS | Mod: CPTII,S$GLB,, | Performed by: PODIATRIST

## 2022-07-25 PROCEDURE — 1160F PR REVIEW ALL MEDS BY PRESCRIBER/CLIN PHARMACIST DOCUMENTED: ICD-10-PCS | Mod: CPTII,S$GLB,, | Performed by: PODIATRIST

## 2022-07-25 PROCEDURE — 99203 PR OFFICE/OUTPT VISIT, NEW, LEVL III, 30-44 MIN: ICD-10-PCS | Mod: S$GLB,,, | Performed by: PODIATRIST

## 2022-07-25 PROCEDURE — 3008F BODY MASS INDEX DOCD: CPT | Mod: CPTII,S$GLB,, | Performed by: PODIATRIST

## 2022-07-25 PROCEDURE — 73630 XR FOOT COMPLETE 3 VIEW LEFT: ICD-10-PCS | Mod: LT,S$GLB,, | Performed by: RADIOLOGY

## 2022-07-25 PROCEDURE — 1160F RVW MEDS BY RX/DR IN RCRD: CPT | Mod: CPTII,S$GLB,, | Performed by: PODIATRIST

## 2022-07-25 RX ORDER — METHYLPREDNISOLONE 4 MG/1
TABLET ORAL
Qty: 1 EACH | Refills: 0 | Status: SHIPPED | OUTPATIENT
Start: 2022-07-25 | End: 2022-08-23

## 2022-07-25 NOTE — PATIENT INSTRUCTIONS
Achilles Tendinitis    Achilles tendinitis is a common condition that occurs when the large tendon that runs down the back of your lower leg becomes irritated and inflamed.  The Achilles tendon is the largest tendon in the body. It connects your calf muscles to your heel bone and is used when you walk, run, climb stairs, jump, and stand on your tip toes. Although the Achilles tendon can withstand great stresses from running and jumping, it is also prone to tendinitis, a condition associated with overuse and degeneration.       Achilles tendinitis pain can occur within the tendon itself or at the point where it attaches to the heel bone, called the Achilles tendon insertion.    Description  Simply defined, tendinitis is inflammation of a tendon. Inflammation is the body's natural response to injury or disease, and often causes swelling, pain, or irritation. There are two types of Achilles tendinitis, based upon which part of the tendon is inflamed.    Noninsertional Achilles tendinitis    Noninsertional Achilles Tendinitis  In noninsertional Achilles tendinitis, fibers in the middle portion of the tendon have begun to break down with tiny tears (degenerate), swell, and thicken.  Tendinitis of the middle portion of the tendon more commonly affects younger, active people.    Insertional Achilles Tendinitis    Insertional Achilles tendinitis    Insertional Achilles tendinitis involves the lower portion of the heel, where the tendon attaches (inserts) to the heel bone.    In both noninsertional and insertional Achilles tendinitis, damaged tendon fibers may also calcify (harden). Bone spurs (extra bone growth) often form with insertional Achilles tendinitis.    Tendinitis that affects the insertion of the tendon can occur at any time, even in patients who are not active. More often than not, however, it comes from years of overuse (long distance runners, sprinters).    Cause  Achilles tendinitis is typically not related  to a specific injury. The problem results from repetitive stress to the tendon. This often happens when we push our bodies to do too much, too soon, but other factors can make it more likely to develop tendinitis, including:  Sudden increase in the amount or intensity of exercise activity--for example, increasing the distance you run every day by a few miles without giving your body a chance to adjust to the new distance  Tight calf muscles--Having tight calf muscles and suddenly starting an aggressive exercise program can put extra stress on the Achilles tendon  Bone spur--Extra bone growth where the Achilles tendon attaches to the heel bone can rub against the tendon and cause pain           Ganglion Cyst  What Is a Ganglion Cyst?  A ganglion cyst is a sac filled with a jellylike fluid that originates from a tendon sheath or joint capsule. The word ganglion means knot and is used to describe the knot-like mass or lump that forms below the surface of the skin.    Ganglion cysts are among the most common benign soft- tissue masses. Although they most often occur on the wrist, they also frequently develop on the foot--usually on the top, but elsewhere as well. Ganglion cysts vary in size, may get smaller and larger and may even disappear completely, only to return later. Several foot ganglion cyst treatment options available.          Causes of Ganglion Cysts  Although the exact cause of ganglion cysts is unknown, they may arise from trauma--whether a single event or repetitive microtrauma.    Symptoms of Ganglion Cysts  A ganglion cyst is associated with one or more of the following symptoms:  A noticeable lump--often this is the only symptom experienced  Tingling or burning, if the cyst is touching a nerve  Dull pain or ache, which may indicate the cyst is pressing against a tendon or joint  Difficulty wearing shoes due to irritation between the lump and the shoe    Diagnosis of Ganglion Cysts  To diagnose a  ganglion cyst, the foot and ankle surgeon will perform a thorough examination of the foot. The lump will be visually apparent, and, when pressed in a certain way, it should move freely underneath the skin. Sometimes the surgeon will shine a light through the cyst or remove a small amount of fluid from the cyst for evaluation. Your doctor may take an x-ray, and in some cases, additional imaging studies may be ordered.    Nonsurgical Treatment  There are various options for treating a ganglion cyst on the foot:  Monitoring but no treatment. If the cyst causes no pain and does not interfere with walking, the surgeon may decide it is best to carefully watch the cyst over a period of time.  Shoe modifications. Wearing shoes that do not rub the cyst or cause irritation may be advised. In addition, placing a pad inside the shoe may help reduce pressure against the cyst.  Aspiration and injection. This technique involves draining the fluid and then injecting a steroid medication into the mass. More than one session may be needed. Although this approach is successful in some cases, in many others, the cyst returns.    When Is Surgery Needed?  When other treatment options fail or are not appropriate, the cyst may need to be surgically removed. While the recurrence rate associated with surgery is much lower than that experienced with aspiration and injection therapy, there are nevertheless cases in which the ganglion cyst returns.    Why choose a foot and ankle surgeon?  Foot and ankle surgeons are the leading experts in foot and ankle care today. As doctors of podiatric medicine - also known as podiatrists, DPMs or occasionally foot and ankle doctors - they are the board-certified surgical specialists of the podiatric profession. Foot and ankle surgeons have more education and training specific to the foot and ankle than any other healthcare provider.    Foot and ankle surgeons treat all conditions affecting the foot and  ankle, from the simple to the complex, in patients of all ages including ganglion cysts. Their intensive education and training qualify foot and ankle surgeons to perform a wide range of surgeries, including any surgery that may be indicated for ganglion cysts.

## 2022-07-25 NOTE — PROGRESS NOTES
"  [address] [place] [address]  Phone: [phone]   [phone]    Patient's PCP:Germán Herron MD  Referring Provider: Dr. Germán Herron    Subjective:      Chief Complaint:: Ankle Pain (Right ankle pain ) and Foot Pain (Left mid foot pain and burning)    HPI  Tereza Weinstein is a 52 y.o. female who presents today with a complaint of right ankle pain and swelling and left mid foot burning pain lasting for eight to twelve months. Onset of symptoms sharp to burning pain and reports no trauma.  Current symptoms include swelling, burning to left midfoot, lump to back of right heel pain in right aching up leg ranging from aching to sharp and throbbing.  Aggravating factors are stairs, walking, weightbearing driving, and prolonged use. Symptoms have progressed over time. Treatment to date have included ice, heat, warm water epsom salt soak, ibuprofen, and right ankle x-ray.       Vitals:    07/25/22 1544   Pulse: 80   Resp: 18   SpO2: 98%   Weight: 87.8 kg (193 lb 8 oz)   Height: 5' 9" (1.753 m)   PainSc:   8      Shoe Size: 9.5-10    Past Surgical History:   Procedure Laterality Date    ANTEGRADE NEPHROSTOGRAPHY Right 12/11/2020    Procedure: NEPHROSTOGRAM, ANTEGRADE;  Surgeon: Yuko Hawkins MD;  Location: [facility] OR;  Service: Urology;  Laterality: Right;    CYSTOSCOPY W/ URETERAL STENT PLACEMENT Right 11/24/2020    Procedure: CYSTOSCOPY, WITH URETERAL STENT INSERTION;  Surgeon: Yuko Hawkins MD;  Location: [facility] OR;  Service: Urology;  Laterality: Right;    CYSTOSCOPY W/ URETERAL STENT REMOVAL Right 12/23/2020    Procedure: CYSTOSCOPY, WITH URETERAL STENT REMOVAL;  Surgeon: Yuko Hawkins MD;  Location: [facility] OR;  Service: Urology;  Laterality: Right;    HYSTERECTOMY      PERCUTANEOUS NEPHROLITHOTRIPSY Right 12/11/2020    Procedure: NEPHROLITHOTRIPSY, PERCUTANEOUS;  Surgeon: Yuko Hawkins MD;  Location: [facility] OR;  Service: Urology;  Laterality: Right;    " PERCUTANEOUS NEPHROSTOMY Right 12/10/2020    Procedure: Creation, Nephrostomy, Percutaneous;  Surgeon: Raulito Diagnostic Provider;  Location: [facility];  Service: General;  Laterality: Right;     Past Medical History:   Diagnosis Date    Kidney stone     PONV (postoperative nausea and vomiting)      History reviewed. No pertinent family history.     Social History:   Marital Status: Single  Alcohol History:  reports no history of alcohol use.  Tobacco History:  reports that she has been smoking cigarettes. She has been smoking about 0.50 packs per day. She has never used smokeless tobacco.  Drug History:  reports no history of drug use.    Review of patient's allergies indicates:   Allergen Reactions    Penicillins      I was young unsure of reaction         Current Outpatient Medications   Medication Sig Dispense Refill    busPIRone (BUSPAR) 5 MG Tab TAKE 1 TABLET(5 MG) BY MOUTH THREE TIMES DAILY AS NEEDED FOR ANXIETY 270 tablet 0    EScitalopram oxalate (LEXAPRO) 10 MG tablet TAKE 1 TABLET(10 MG) BY MOUTH EVERY DAY 90 tablet 0    hydroCHLOROthiazide (HYDRODIURIL) 25 MG tablet TAKE 1 TABLET(25 MG) BY MOUTH EVERY DAY 30 tablet 11    methylPREDNISolone (MEDROL DOSEPACK) 4 mg tablet use as directed 1 each 0    predniSONE (DELTASONE) 10 MG tablet Take 1 tablet (10 mg total) by mouth once daily. (Patient not taking: Reported on 7/25/2022) 5 tablet 0    traZODone (DESYREL) 50 MG tablet Take 1 tablet (50 mg total) by mouth nightly as needed for Insomnia. 30 tablet 11     No current facility-administered medications for this visit.       Review of Systems   Constitutional: Negative for chills, fatigue, fever and unexpected weight change.   HENT: Negative for hearing loss and trouble swallowing.    Eyes: Negative for photophobia and visual disturbance.   Respiratory: Negative for cough, shortness of breath and wheezing.    Cardiovascular: Negative for chest pain, palpitations and leg swelling.   Gastrointestinal:  Negative for abdominal pain and nausea.   Genitourinary: Negative for dysuria and frequency.   Musculoskeletal: Positive for arthralgias and joint swelling. Negative for back pain, gait problem and myalgias.   Skin: Negative for rash and wound.   Neurological: Negative for tremors, seizures, weakness, numbness and headaches.   Hematological: Does not bruise/bleed easily.         Objective:        Physical Exam:   Foot Exam    General  General Appearance: appears stated age and healthy   Orientation: alert and oriented to person, place, and time   Affect: appropriate   Gait: unimpaired       Right Foot/Ankle     Inspection and Palpation  Ecchymosis: none  Tenderness: calcaneus tenderness (Achilles insertion)  Swelling: (Achilles insertion)  Arch: normal  Skin Exam: skin intact; no drainage, no ulcer and no erythema   Neurovascular  Dorsalis pedis: 2+  Posterior tibial: 2+  Capillary Refill: 2+  Varicose veins: not present  Saphenous nerve sensation: normal  Tibial nerve sensation: normal  Superficial peroneal nerve sensation: normal  Deep peroneal nerve sensation: normal  Sural nerve sensation: normal    Edema  Type of edema: non-pitting    Muscle Strength  Ankle dorsiflexion: 5  Ankle plantar flexion: 5  Ankle inversion: 5  Ankle eversion: 5  Great toe extension: 5  Great toe flexion: 5    Range of Motion    Passive  Ankle dorsiflexion: 5, pain    Active  Ankle plantar flexion: pain    Tests  Anterior drawer: negative   Talar tilt: negative   PT Tinel's sign: negative    Paresthesia: negative    Left Foot/Ankle      Inspection and Palpation  Ecchymosis: none  Tenderness: lisfranc joint   Swelling: lisfranc joint   Arch: normal  Skin Exam: skin intact; no drainage, no ulcer and no erythema   Neurovascular  Dorsalis pedis: 2+  Posterior tibial: 2+  Capillary refill: 2+  Varicose veins: not present  Saphenous nerve sensation: normal  Tibial nerve sensation: normal  Superficial peroneal nerve sensation: normal  Deep  peroneal nerve sensation: normal  Sural nerve sensation: normal    Edema  Type of edema: non-pitting    Muscle Strength  Ankle dorsiflexion: 5  Ankle plantar flexion: 5  Ankle inversion: 5  Ankle eversion: 5  Great toe extension: 5  Great toe flexion: 5    Range of Motion    Normal left ankle ROM    Tests  Anterior drawer: negative   Talar tilt: negative   PT Tinel's sign: negative  Paresthesia: negative        Physical Exam  Cardiovascular:      Pulses:           Dorsalis pedis pulses are 2+ on the right side and 2+ on the left side.        Posterior tibial pulses are 2+ on the right side and 2+ on the left side.   Feet:      Right foot:      Skin integrity: No ulcer or erythema.      Left foot:      Skin integrity: No ulcer or erythema.               Left Ankle/Foot Exam     Range of Motion   The patient has normal left ankle ROM.       Muscle Strength   Right Lower Extremity   Ankle Dorsiflexion:  5   Plantar flexion:  5/5  Left Lower Extremity   Ankle Dorsiflexion:  5   Plantar flexion:  5/5     Vascular Exam     Right Pulses  Dorsalis Pedis:      2+  Posterior Tibial:      2+        Left Pulses  Dorsalis Pedis:      2+  Posterior Tibial:      2+             Imaging: X-Ray Foot Complete Left  Narrative: EXAMINATION:  XR FOOT COMPLETE 3 VIEW LEFT    CLINICAL HISTORY:  .  Pain in left foot    TECHNIQUE:  AP, lateral and oblique views of the left foot were performed.    COMPARISON:  None    FINDINGS:  There is bunion formation at the great toe metatarsal head.  There is a bipartite lateral sesamoid bone beneath the left great toe metatarsal head.  There is degenerative change of the great toe MTP joint.  There is degenerative change of the interphalangeal joints of the left forefoot.  There is Achilles insertion calcaneal enthesophyte formation and plantar calcaneal spur formation.  No radiographically evident acute, displaced fracture, dislocation, or osseous destructive process.  Impression: As  above    Electronically signed by: Servando Colon MD  Date:    07/25/2022  Time:    17:12    Narrative & Impression  EXAMINATION:  XR ANKLE COMPLETE 3 VIEW RIGHT     CLINICAL HISTORY:  Achilles tendinitis, unspecified leg     TECHNIQUE:  AP, lateral, and oblique images of the right ankle were performed.     COMPARISON:  None     FINDINGS:  Bone density is normal.  There is no fracture or dislocation.  The ankle mortise is intact.  There is a large plantar calcaneal spur measuring approximately 1 cm.  There is also an Achilles tendon enthesophyte with calcifications at the posterior calcaneus which can be seen in the setting of calcific tendinitis.  There is associated soft tissue swelling at this site.  Soft tissues are otherwise normal.     Impression:     As above        Electronically signed by: Quan Garcia MD  Date:                                            07/13/2022  Time:                                           17:46           Assessment:       1. Insertional Achilles tendinopathy    2. Calcific Achilles tendinitis of right lower extremity    3. Ganglion cyst of foot    4. Acute right ankle pain    5. Left foot pain      Plan:   Insertional Achilles tendinopathy  -     Ambulatory referral/consult to Podiatry  -     methylPREDNISolone (MEDROL DOSEPACK) 4 mg tablet; use as directed  Dispense: 1 each; Refill: 0    Calcific Achilles tendinitis of right lower extremity    Ganglion cyst of foot    Acute right ankle pain  -     methylPREDNISolone (MEDROL DOSEPACK) 4 mg tablet; use as directed  Dispense: 1 each; Refill: 0    Left foot pain  -     X-Ray Foot Complete Left      Follow up if symptoms worsen or fail to improve.    Procedures        CAM walker boot with weight bearing  Ice to painful area, 15 minutes at a time  Ace-wrap to help with swelling  No barefoot   Keep affected extremity elevated while seated      Counseling:     I provided patient education verbally regarding:   Patient diagnosis,  treatment options, as well as alternatives, risks, and benefits.     Discussed treatment of Achilles tendonitis with rest, ice, oral NSAID, topical antiinflammatory creams, heel lifts, no barefoot, and cam walker boot if needed. Explained the importance of proper stretching. Discussed possibility of MRI for further evaluation if symptoms do not improve.     I discussed calcific achilles tendenosis and possible tear of the insertion.  I discussed conservative treatment with open back wedge shoes, heel lifts in running shoes, ROM with gentle stretching, possible long leg CAM walker boot cast with heel lifts, possible physical therapy.  If pain continues or if there is a concern of partial tear of the tendon thenan MRI will be ordered.        I discussed the options of treatment for the ganglion cyst including no treatment, aspiration under local anesthesia and injection of cortisone and excision of the mass.  I explained how a ganglion is either growing out of a joint or a tendon. I also explained the risk of recurrence.      This note was created using Dragon voice recognition software that occasionally misinterpreted phrases or words.

## 2022-07-26 DIAGNOSIS — M79.672 LEFT FOOT PAIN: ICD-10-CM

## 2022-07-26 DIAGNOSIS — M25.571 ACUTE RIGHT ANKLE PAIN: ICD-10-CM

## 2022-07-26 DIAGNOSIS — M67.479 GANGLION CYST OF FOOT: ICD-10-CM

## 2022-07-26 DIAGNOSIS — M76.60 INSERTIONAL ACHILLES TENDINOPATHY: Primary | ICD-10-CM

## 2022-08-17 ENCOUNTER — LAB VISIT (OUTPATIENT)
Dept: LAB | Facility: HOSPITAL | Age: 52
End: 2022-08-17
Attending: STUDENT IN AN ORGANIZED HEALTH CARE EDUCATION/TRAINING PROGRAM
Payer: COMMERCIAL

## 2022-08-17 ENCOUNTER — OFFICE VISIT (OUTPATIENT)
Dept: FAMILY MEDICINE | Facility: CLINIC | Age: 52
End: 2022-08-17
Payer: COMMERCIAL

## 2022-08-17 VITALS
BODY MASS INDEX: 28.47 KG/M2 | TEMPERATURE: 99 F | RESPIRATION RATE: 16 BRPM | DIASTOLIC BLOOD PRESSURE: 76 MMHG | HEIGHT: 69 IN | HEART RATE: 78 BPM | OXYGEN SATURATION: 98 % | SYSTOLIC BLOOD PRESSURE: 110 MMHG | WEIGHT: 192.25 LBS

## 2022-08-17 DIAGNOSIS — F43.21 GRIEF: ICD-10-CM

## 2022-08-17 DIAGNOSIS — F41.9 ANXIETY AND DEPRESSION: ICD-10-CM

## 2022-08-17 DIAGNOSIS — F32.A ANXIETY AND DEPRESSION: ICD-10-CM

## 2022-08-17 DIAGNOSIS — R73.09 ELEVATED GLUCOSE LEVEL: ICD-10-CM

## 2022-08-17 DIAGNOSIS — E11.65 TYPE 2 DIABETES MELLITUS WITH HYPERGLYCEMIA, WITHOUT LONG-TERM CURRENT USE OF INSULIN: ICD-10-CM

## 2022-08-17 DIAGNOSIS — H53.8 BLURRED VISION, BILATERAL: ICD-10-CM

## 2022-08-17 DIAGNOSIS — R73.09 ELEVATED GLUCOSE LEVEL: Primary | ICD-10-CM

## 2022-08-17 LAB
ALBUMIN SERPL BCP-MCNC: 3.7 G/DL (ref 3.5–5.2)
ALP SERPL-CCNC: 90 U/L (ref 55–135)
ALT SERPL W/O P-5'-P-CCNC: 21 U/L (ref 10–44)
ANION GAP SERPL CALC-SCNC: 9 MMOL/L (ref 8–16)
AST SERPL-CCNC: 13 U/L (ref 10–40)
BILIRUB SERPL-MCNC: 0.6 MG/DL (ref 0.1–1)
BUN SERPL-MCNC: 14 MG/DL (ref 6–20)
CALCIUM SERPL-MCNC: 9.7 MG/DL (ref 8.7–10.5)
CHLORIDE SERPL-SCNC: 101 MMOL/L (ref 95–110)
CO2 SERPL-SCNC: 26 MMOL/L (ref 23–29)
CREAT SERPL-MCNC: 0.7 MG/DL (ref 0.5–1.4)
EST. GFR  (NO RACE VARIABLE): >60 ML/MIN/1.73 M^2
ESTIMATED AVG GLUCOSE: 169 MG/DL (ref 68–131)
GLUCOSE SERPL-MCNC: 172 MG/DL (ref 70–110)
HBA1C MFR BLD: 7.5 % (ref 4–5.6)
POTASSIUM SERPL-SCNC: 4 MMOL/L (ref 3.5–5.1)
PROT SERPL-MCNC: 6.5 G/DL (ref 6–8.4)
SODIUM SERPL-SCNC: 136 MMOL/L (ref 136–145)

## 2022-08-17 PROCEDURE — 3074F SYST BP LT 130 MM HG: CPT | Mod: CPTII,S$GLB,,

## 2022-08-17 PROCEDURE — 36415 COLL VENOUS BLD VENIPUNCTURE: CPT | Mod: PO

## 2022-08-17 PROCEDURE — 99214 PR OFFICE/OUTPT VISIT, EST, LEVL IV, 30-39 MIN: ICD-10-PCS | Mod: S$GLB,,,

## 2022-08-17 PROCEDURE — 3008F PR BODY MASS INDEX (BMI) DOCUMENTED: ICD-10-PCS | Mod: CPTII,S$GLB,,

## 2022-08-17 PROCEDURE — 1159F PR MEDICATION LIST DOCUMENTED IN MEDICAL RECORD: ICD-10-PCS | Mod: CPTII,S$GLB,,

## 2022-08-17 PROCEDURE — 1160F RVW MEDS BY RX/DR IN RCRD: CPT | Mod: CPTII,S$GLB,,

## 2022-08-17 PROCEDURE — 1160F PR REVIEW ALL MEDS BY PRESCRIBER/CLIN PHARMACIST DOCUMENTED: ICD-10-PCS | Mod: CPTII,S$GLB,,

## 2022-08-17 PROCEDURE — 99999 PR PBB SHADOW E&M-EST. PATIENT-LVL V: CPT | Mod: PBBFAC,,,

## 2022-08-17 PROCEDURE — 99214 OFFICE O/P EST MOD 30 MIN: CPT | Mod: S$GLB,,,

## 2022-08-17 PROCEDURE — 3078F DIAST BP <80 MM HG: CPT | Mod: CPTII,S$GLB,,

## 2022-08-17 PROCEDURE — 83036 HEMOGLOBIN GLYCOSYLATED A1C: CPT

## 2022-08-17 PROCEDURE — 99999 PR PBB SHADOW E&M-EST. PATIENT-LVL V: ICD-10-PCS | Mod: PBBFAC,,,

## 2022-08-17 PROCEDURE — 1159F MED LIST DOCD IN RCRD: CPT | Mod: CPTII,S$GLB,,

## 2022-08-17 PROCEDURE — 80053 COMPREHEN METABOLIC PANEL: CPT

## 2022-08-17 PROCEDURE — 3074F PR MOST RECENT SYSTOLIC BLOOD PRESSURE < 130 MM HG: ICD-10-PCS | Mod: CPTII,S$GLB,,

## 2022-08-17 PROCEDURE — 3008F BODY MASS INDEX DOCD: CPT | Mod: CPTII,S$GLB,,

## 2022-08-17 PROCEDURE — 3078F PR MOST RECENT DIASTOLIC BLOOD PRESSURE < 80 MM HG: ICD-10-PCS | Mod: CPTII,S$GLB,,

## 2022-08-17 RX ORDER — METFORMIN HYDROCHLORIDE 500 MG/1
500 TABLET, EXTENDED RELEASE ORAL
Qty: 90 TABLET | Refills: 3 | Status: SHIPPED | OUTPATIENT
Start: 2022-08-17 | End: 2022-08-23

## 2022-08-17 NOTE — PROGRESS NOTES
Subjective:       Patient ID: Tereza Weinstein is a 52 y.o. female.    Chief Complaint: Blurred Vision    Suzanne Weinstein is a 53 yo F pt w/ Mhx listed below that presents to the clinic w/ complaints of blurred vision and elevated glucose readings on recent lab work. Her aunt and brother have been previously diagnosed with T2DM. Her brother went into a diabetic coma at one point and passed away. She denies changes in her diet recently. Vision change is bilateral but worse on R side.       Past Medical History:   Diagnosis Date    Kidney stone     PONV (postoperative nausea and vomiting)        Review of patient's allergies indicates:   Allergen Reactions    Penicillins      I was young unsure of reaction           Current Outpatient Medications:     busPIRone (BUSPAR) 5 MG Tab, TAKE 1 TABLET(5 MG) BY MOUTH THREE TIMES DAILY AS NEEDED FOR ANXIETY, Disp: 270 tablet, Rfl: 0    EScitalopram oxalate (LEXAPRO) 10 MG tablet, TAKE 1 TABLET(10 MG) BY MOUTH EVERY DAY, Disp: 90 tablet, Rfl: 0    hydroCHLOROthiazide (HYDRODIURIL) 25 MG tablet, TAKE 1 TABLET(25 MG) BY MOUTH EVERY DAY, Disp: 30 tablet, Rfl: 11    traZODone (DESYREL) 50 MG tablet, Take 1 tablet (50 mg total) by mouth nightly as needed for Insomnia., Disp: 30 tablet, Rfl: 11    methylPREDNISolone (MEDROL DOSEPACK) 4 mg tablet, use as directed (Patient not taking: Reported on 8/17/2022), Disp: 1 each, Rfl: 0    predniSONE (DELTASONE) 10 MG tablet, Take 1 tablet (10 mg total) by mouth once daily. (Patient not taking: No sig reported), Disp: 5 tablet, Rfl: 0    Review of Systems   Constitutional: Negative for activity change, appetite change, chills, diaphoresis, fatigue, fever and unexpected weight change.   HENT: Negative for congestion, ear pain, postnasal drip, rhinorrhea, sinus pressure, sneezing, sore throat and trouble swallowing.    Eyes: Positive for visual disturbance. Negative for pain and itching.   Respiratory: Negative for cough, chest  "tightness, shortness of breath and wheezing.    Cardiovascular: Negative for chest pain, palpitations and leg swelling.   Gastrointestinal: Negative for abdominal distention, abdominal pain, blood in stool, constipation, diarrhea, nausea and vomiting.   Endocrine: Negative for cold intolerance and heat intolerance.   Genitourinary: Negative for difficulty urinating, dysuria, frequency, hematuria and urgency.   Musculoskeletal: Negative for arthralgias, back pain, myalgias and neck pain.   Skin: Negative for color change, pallor, rash and wound.   Neurological: Negative for dizziness, syncope, weakness, numbness and headaches.   Hematological: Negative for adenopathy.   Psychiatric/Behavioral: Positive for dysphoric mood. Negative for behavioral problems. The patient is not nervous/anxious.        Objective:      /76 (BP Location: Right arm, Patient Position: Sitting, BP Method: Medium (Manual))   Pulse 78   Temp 98.8 °F (37.1 °C) (Oral)   Resp 16   Ht 5' 9" (1.753 m)   Wt 87.2 kg (192 lb 3.9 oz)   SpO2 98%   BMI 28.39 kg/m²   Physical Exam  Vitals reviewed.   Constitutional:       General: She is not in acute distress.     Appearance: Normal appearance. She is normal weight. She is not ill-appearing, toxic-appearing or diaphoretic.   HENT:      Head: Normocephalic.      Right Ear: External ear normal.      Left Ear: External ear normal.      Nose: Nose normal. No congestion or rhinorrhea.      Mouth/Throat:      Mouth: Mucous membranes are moist.      Pharynx: Oropharynx is clear.   Eyes:      General: No scleral icterus.        Right eye: No discharge.         Left eye: No discharge.      Extraocular Movements: Extraocular movements intact.      Conjunctiva/sclera: Conjunctivae normal.   Cardiovascular:      Rate and Rhythm: Normal rate and regular rhythm.      Pulses: Normal pulses.      Heart sounds: Normal heart sounds. No murmur heard.    No friction rub. No gallop.   Pulmonary:      Effort: " Pulmonary effort is normal. No respiratory distress.      Breath sounds: Normal breath sounds. No wheezing, rhonchi or rales.   Chest:      Chest wall: No tenderness.   Musculoskeletal:         General: No swelling, tenderness or deformity. Normal range of motion.      Cervical back: Normal range of motion.      Right lower leg: No edema.      Left lower leg: No edema.   Skin:     General: Skin is warm and dry.      Capillary Refill: Capillary refill takes less than 2 seconds.      Coloration: Skin is not jaundiced.      Findings: No bruising, erythema, lesion or rash.   Neurological:      Mental Status: She is alert and oriented to person, place, and time.      Gait: Gait normal.   Psychiatric:         Mood and Affect: Mood normal.         Behavior: Behavior normal.         Thought Content: Thought content normal.         Judgment: Judgment normal.         Assessment:       1. Elevated glucose level    2. Type 2 diabetes mellitus with hyperglycemia, without long-term current use of insulin    3. Blurred vision, bilateral    4. Anxiety and depression    5. Grief        Plan:       Elevated glucose level  -     Comprehensive Metabolic Panel; Future; Expected date: 08/17/2022  -     Hemoglobin A1C; Future; Expected date: 08/17/2022    Type 2 diabetes mellitus with hyperglycemia, without long-term current use of insulin  -     Ambulatory referral/consult to Optometry; Future; Expected date: 08/24/2022    Blurred vision, bilateral  -     Ambulatory referral/consult to Optometry; Future; Expected date: 08/24/2022    Anxiety and depression         -    Stable. Continue meds. Will continue to monitor.    Grief          -    Stable. Continue meds. Will continue to monitor.        Pt is scheduled for routine follow up w/ Dr. Herron in January.        Chaparro Bansal PA-C  Family Medicine Physician Assistant       I spent a total of 20 minutes on the day of the visit.This includes face to face time and non-face to face time  preparing to see the patient (eg, review of tests), obtaining and/or reviewing separately obtained history, documenting clinical information in the electronic or other health record, independently interpreting results and communicating results to the patient/family/caregiver, or care coordinator.      We have addressed [4] Moderate: 1 or more chronic illnesses with exacerbation, progression, or side effects of treatment / 2 or more stable chronic illnesses / 1 undiagnosed new problem with uncertain prognosis / 1 acute illness with systemic symptoms / 1 acute complicated injury  The complexity of the data reviewed and analyzed for this visit was [3] Limited (Reviewed prior external note, ordered unique testing or reviewed the results of each unique test)   The risk of complications and/or morbidity or mortality are [4] Moderate risk (I.e. prescription drug management / decision regarding minor surgery with identified pt or procedure risk factors / decision regarding elective major surgery without identified pt or procedure risk factors / diagnosis or treatment significantly limited by social determinants of health)   The level of Medical Decision Making for this visit is [4] Moderate

## 2022-08-17 NOTE — PATIENT INSTRUCTIONS
Josue Starks,     If you are due for any health screening(s) below please notify me so we can arrange them to be ordered and scheduled to maintain your health. Most healthy patients complete it. Don't lose out on improving your health.       Mammogram: ORDERED BUT NOT SCHEDULED  Colon Cancer Screening: DUE  Tobacco Cessation: NO     Breast Cancer Screening    Breast cancer is the second most common cancer in women after skin cancer, and the second leading cause of death from cancer after lung cancer. Mammograms can detect breast cancer early, which significantly increases the chances of curing the cancer.      A screening mammogram is an x-ray image of the breasts used for early breast cancer detection. It can help reduce the number of deaths from breast cancer among women. To get a clear image, the breast is placed between two plastic plates to make it flat. How often a mammogram is needed depends on your age and your breast cancer risk.    Although you are still overdue for this important screening, due to the COVID-19 pandemic, we recommend scheduling it in the near future.  Colon Cancer Screening    Of cancers affecting both men and women, colorectal cancer is the third leading cancer killer in the [place]. But it doesnt have to be. Screening can prevent colorectal cancer or find it at an early stage when treatment often leads to a cure.    A colonoscopy is the preferred test for detecting colon cancer. It is needed only once every 10 years if results are negative. While sedated, a flexible, lighted tube with a tiny camera is inserted into the rectum and advanced through the colon to look for cancers. An alternative screening test that is used at home and returned to the lab may also be used. It detects hidden blood in bowel movements which could indicate cancer in the colon. If results are positive, you will need a colonoscopy to determine if the blood is a sign of cancer. This type of follow up (diagnostic)  colonoscopy usually requires additional copays as required by your insurance provider. Please contact your PCP if you have any questions.    Although you are still overdue for this important screening, due to the COVID-19 pandemic, we recommend scheduling it in the near future.

## 2022-08-23 ENCOUNTER — TELEPHONE (OUTPATIENT)
Dept: FAMILY MEDICINE | Facility: CLINIC | Age: 52
End: 2022-08-23
Payer: COMMERCIAL

## 2022-08-23 ENCOUNTER — OFFICE VISIT (OUTPATIENT)
Dept: FAMILY MEDICINE | Facility: CLINIC | Age: 52
End: 2022-08-23
Payer: COMMERCIAL

## 2022-08-23 ENCOUNTER — LAB VISIT (OUTPATIENT)
Dept: LAB | Facility: HOSPITAL | Age: 52
End: 2022-08-23
Attending: PHYSICIAN ASSISTANT
Payer: COMMERCIAL

## 2022-08-23 VITALS
DIASTOLIC BLOOD PRESSURE: 72 MMHG | SYSTOLIC BLOOD PRESSURE: 118 MMHG | RESPIRATION RATE: 17 BRPM | HEART RATE: 86 BPM | TEMPERATURE: 98 F | WEIGHT: 191.38 LBS | BODY MASS INDEX: 28.35 KG/M2 | HEIGHT: 69 IN | OXYGEN SATURATION: 97 %

## 2022-08-23 DIAGNOSIS — Z11.4 SCREENING FOR HIV WITHOUT PRESENCE OF RISK FACTORS: ICD-10-CM

## 2022-08-23 DIAGNOSIS — E83.52 HYPERCALCEMIA: ICD-10-CM

## 2022-08-23 DIAGNOSIS — R23.3 EASY BRUISING: ICD-10-CM

## 2022-08-23 DIAGNOSIS — G44.209 TENSION-TYPE HEADACHE, NOT INTRACTABLE, UNSPECIFIED CHRONICITY PATTERN: ICD-10-CM

## 2022-08-23 DIAGNOSIS — E11.65 TYPE 2 DIABETES MELLITUS WITH HYPERGLYCEMIA, WITHOUT LONG-TERM CURRENT USE OF INSULIN: ICD-10-CM

## 2022-08-23 DIAGNOSIS — R68.89 COLD INTOLERANCE: Primary | ICD-10-CM

## 2022-08-23 DIAGNOSIS — R19.7 DIARRHEA, UNSPECIFIED TYPE: ICD-10-CM

## 2022-08-23 DIAGNOSIS — D69.2 PURPURA: ICD-10-CM

## 2022-08-23 DIAGNOSIS — R68.89 COLD INTOLERANCE: ICD-10-CM

## 2022-08-23 DIAGNOSIS — E21.3 HYPERPARATHYROIDISM: ICD-10-CM

## 2022-08-23 LAB
ANION GAP SERPL CALC-SCNC: 10 MMOL/L (ref 8–16)
BASOPHILS # BLD AUTO: 0.03 K/UL (ref 0–0.2)
BASOPHILS NFR BLD: 0.3 % (ref 0–1.9)
BUN SERPL-MCNC: 11 MG/DL (ref 6–20)
CALCIUM SERPL-MCNC: 10.1 MG/DL (ref 8.7–10.5)
CHLORIDE SERPL-SCNC: 101 MMOL/L (ref 95–110)
CO2 SERPL-SCNC: 26 MMOL/L (ref 23–29)
CREAT SERPL-MCNC: 0.7 MG/DL (ref 0.5–1.4)
DIFFERENTIAL METHOD: ABNORMAL
EOSINOPHIL # BLD AUTO: 0 K/UL (ref 0–0.5)
EOSINOPHIL NFR BLD: 0.4 % (ref 0–8)
ERYTHROCYTE [DISTWIDTH] IN BLOOD BY AUTOMATED COUNT: 11.9 % (ref 11.5–14.5)
EST. GFR  (NO RACE VARIABLE): >60 ML/MIN/1.73 M^2
FERRITIN SERPL-MCNC: 138 NG/ML (ref 20–300)
GLUCOSE SERPL-MCNC: 151 MG/DL (ref 70–110)
GLUCOSE SERPL-MCNC: 172 MG/DL (ref 70–110)
HCT VFR BLD AUTO: 46.6 % (ref 37–48.5)
HGB BLD-MCNC: 15.6 G/DL (ref 12–16)
IMM GRANULOCYTES # BLD AUTO: 0.03 K/UL (ref 0–0.04)
IMM GRANULOCYTES NFR BLD AUTO: 0.3 % (ref 0–0.5)
IRON SERPL-MCNC: 112 UG/DL (ref 30–160)
LYMPHOCYTES # BLD AUTO: 3.5 K/UL (ref 1–4.8)
LYMPHOCYTES NFR BLD: 37.3 % (ref 18–48)
MCH RBC QN AUTO: 33.3 PG (ref 27–31)
MCHC RBC AUTO-ENTMCNC: 33.5 G/DL (ref 32–36)
MCV RBC AUTO: 99 FL (ref 82–98)
MONOCYTES # BLD AUTO: 0.7 K/UL (ref 0.3–1)
MONOCYTES NFR BLD: 6.9 % (ref 4–15)
NEUTROPHILS # BLD AUTO: 5.2 K/UL (ref 1.8–7.7)
NEUTROPHILS NFR BLD: 54.8 % (ref 38–73)
NRBC BLD-RTO: 0 /100 WBC
PLATELET # BLD AUTO: 293 K/UL (ref 150–450)
PMV BLD AUTO: 10 FL (ref 9.2–12.9)
POTASSIUM SERPL-SCNC: 3.9 MMOL/L (ref 3.5–5.1)
RBC # BLD AUTO: 4.69 M/UL (ref 4–5.4)
RETICS/RBC NFR AUTO: 1.9 % (ref 0.5–2.5)
SATURATED IRON: 24 % (ref 20–50)
SODIUM SERPL-SCNC: 137 MMOL/L (ref 136–145)
TOTAL IRON BINDING CAPACITY: 469 UG/DL (ref 250–450)
TRANSFERRIN SERPL-MCNC: 317 MG/DL (ref 200–375)
TSH SERPL DL<=0.005 MIU/L-ACNC: 1.92 UIU/ML (ref 0.4–4)
WBC # BLD AUTO: 9.47 K/UL (ref 3.9–12.7)

## 2022-08-23 PROCEDURE — 3008F PR BODY MASS INDEX (BMI) DOCUMENTED: ICD-10-PCS | Mod: CPTII,S$GLB,, | Performed by: PHYSICIAN ASSISTANT

## 2022-08-23 PROCEDURE — 99214 OFFICE O/P EST MOD 30 MIN: CPT | Mod: S$GLB,,, | Performed by: PHYSICIAN ASSISTANT

## 2022-08-23 PROCEDURE — 99999 PR PBB SHADOW E&M-EST. PATIENT-LVL V: CPT | Mod: PBBFAC,,, | Performed by: PHYSICIAN ASSISTANT

## 2022-08-23 PROCEDURE — 82962 GLUCOSE BLOOD TEST: CPT | Mod: S$GLB,,, | Performed by: PHYSICIAN ASSISTANT

## 2022-08-23 PROCEDURE — 99214 PR OFFICE/OUTPT VISIT, EST, LEVL IV, 30-39 MIN: ICD-10-PCS | Mod: S$GLB,,, | Performed by: PHYSICIAN ASSISTANT

## 2022-08-23 PROCEDURE — 1159F PR MEDICATION LIST DOCUMENTED IN MEDICAL RECORD: ICD-10-PCS | Mod: CPTII,S$GLB,, | Performed by: PHYSICIAN ASSISTANT

## 2022-08-23 PROCEDURE — 82962 POCT GLUCOSE, HAND-HELD DEVICE: ICD-10-PCS | Mod: S$GLB,,, | Performed by: PHYSICIAN ASSISTANT

## 2022-08-23 PROCEDURE — 84466 ASSAY OF TRANSFERRIN: CPT | Performed by: PHYSICIAN ASSISTANT

## 2022-08-23 PROCEDURE — 3078F DIAST BP <80 MM HG: CPT | Mod: CPTII,S$GLB,, | Performed by: PHYSICIAN ASSISTANT

## 2022-08-23 PROCEDURE — 85045 AUTOMATED RETICULOCYTE COUNT: CPT | Performed by: PHYSICIAN ASSISTANT

## 2022-08-23 PROCEDURE — 3078F PR MOST RECENT DIASTOLIC BLOOD PRESSURE < 80 MM HG: ICD-10-PCS | Mod: CPTII,S$GLB,, | Performed by: PHYSICIAN ASSISTANT

## 2022-08-23 PROCEDURE — 3074F PR MOST RECENT SYSTOLIC BLOOD PRESSURE < 130 MM HG: ICD-10-PCS | Mod: CPTII,S$GLB,, | Performed by: PHYSICIAN ASSISTANT

## 2022-08-23 PROCEDURE — 3074F SYST BP LT 130 MM HG: CPT | Mod: CPTII,S$GLB,, | Performed by: PHYSICIAN ASSISTANT

## 2022-08-23 PROCEDURE — 3051F HG A1C>EQUAL 7.0%<8.0%: CPT | Mod: CPTII,S$GLB,, | Performed by: PHYSICIAN ASSISTANT

## 2022-08-23 PROCEDURE — 3051F PR MOST RECENT HEMOGLOBIN A1C LEVEL 7.0 - < 8.0%: ICD-10-PCS | Mod: CPTII,S$GLB,, | Performed by: PHYSICIAN ASSISTANT

## 2022-08-23 PROCEDURE — 85730 THROMBOPLASTIN TIME PARTIAL: CPT | Performed by: PHYSICIAN ASSISTANT

## 2022-08-23 PROCEDURE — 85025 COMPLETE CBC W/AUTO DIFF WBC: CPT | Performed by: PHYSICIAN ASSISTANT

## 2022-08-23 PROCEDURE — 1159F MED LIST DOCD IN RCRD: CPT | Mod: CPTII,S$GLB,, | Performed by: PHYSICIAN ASSISTANT

## 2022-08-23 PROCEDURE — 84443 ASSAY THYROID STIM HORMONE: CPT | Performed by: PHYSICIAN ASSISTANT

## 2022-08-23 PROCEDURE — 99999 PR PBB SHADOW E&M-EST. PATIENT-LVL V: ICD-10-PCS | Mod: PBBFAC,,, | Performed by: PHYSICIAN ASSISTANT

## 2022-08-23 PROCEDURE — 85610 PROTHROMBIN TIME: CPT | Performed by: PHYSICIAN ASSISTANT

## 2022-08-23 PROCEDURE — 82728 ASSAY OF FERRITIN: CPT | Performed by: PHYSICIAN ASSISTANT

## 2022-08-23 PROCEDURE — 36415 COLL VENOUS BLD VENIPUNCTURE: CPT | Mod: PO | Performed by: PHYSICIAN ASSISTANT

## 2022-08-23 PROCEDURE — 3008F BODY MASS INDEX DOCD: CPT | Mod: CPTII,S$GLB,, | Performed by: PHYSICIAN ASSISTANT

## 2022-08-23 PROCEDURE — 80048 BASIC METABOLIC PNL TOTAL CA: CPT | Performed by: PHYSICIAN ASSISTANT

## 2022-08-23 PROCEDURE — 87389 HIV-1 AG W/HIV-1&-2 AB AG IA: CPT | Performed by: PHYSICIAN ASSISTANT

## 2022-08-23 RX ORDER — LANCETS
EACH MISCELLANEOUS
Qty: 200 EACH | Refills: 1 | Status: SHIPPED | OUTPATIENT
Start: 2022-08-23 | End: 2022-12-07 | Stop reason: SDUPTHER

## 2022-08-23 RX ORDER — INSULIN PUMP SYRINGE, 3 ML
EACH MISCELLANEOUS
Qty: 1 EACH | Refills: 0 | Status: SHIPPED | OUTPATIENT
Start: 2022-08-23 | End: 2022-10-26 | Stop reason: SDUPTHER

## 2022-08-23 NOTE — TELEPHONE ENCOUNTER
I spoke with pt via phone. She states that her body is just cold and she cannot warm up. She states that she has on a sweater and went outside and still cannot get warm. She tells me that she has tested her blood sugar more than once and it is ranging from 260-300. She states that they are never that high and thinks that may be the issue. She has no other symptoms currently.   Consulted with [facility] appointment okay.   Pt scheduled to see Mrs. Portillo at 9:40. All understanding voiced of time and location .       ----- Message from Summer John sent at 8/23/2022  8:20 AM CDT -----  .Type:  Patient Call Back    Who Called: PT       Does the patient know what this is regarding?: PT CALLED TO SPEAK WITH THE OFFICE PT BLOOD SUGAR [phone] PT SITTING OUTSIDE HER BODY IS FREEZING     Would the patient rather a call back YES     Best Call Back Number: [phone]    Additional Information: Thank You

## 2022-08-23 NOTE — PROGRESS NOTES
"Subjective:       Patient ID: Tereza Weinstein is a 52 y.o. female.    Chief Complaint: Diabetes (Very cold in the morning. Temp 98.2)    Suzanne Weinstein is a 52 year old female with a medical history of T2DM, hyperparathyroidism, and nephrolithiasis who presents to the clinic complaining of being intolerably cold. She states that she has been having this issue since starting Metformin 5 days ago. She states that no matter where she goes, she is freezing cold and cannot do anything to warm up. She reports that she was outside in a sweater and was still extremely cold. She denies a history of similar symptoms. She also reports headaches that she has been having for a while but have recently increased in frequency. She describes them as varying in type but most commonly they are on both or one side of her head and are squeezing in nature. She treats the headaches with advil but states they often linger. Upon further questioning she also reports diarrhea for at least one year. She denies ever seeing a gastroenterologist for this and has never had a colonoscopy. She also reports "red marks" on her arms and hands that she has been getting for at least a year. She states she gets the marks all over and cannot relate them to any mechanical injury as she sometimes wakes up with new ones. She denies any itching in the area and states that they take a long time to heal. She has no other complaints or concerns at this time.     Review of patient's allergies indicates:   Allergen Reactions    Penicillins      I was young unsure of reaction           Current Outpatient Medications:     busPIRone (BUSPAR) 5 MG Tab, TAKE 1 TABLET(5 MG) BY MOUTH THREE TIMES DAILY AS NEEDED FOR ANXIETY, Disp: 270 tablet, Rfl: 0    EScitalopram oxalate (LEXAPRO) 10 MG tablet, TAKE 1 TABLET(10 MG) BY MOUTH EVERY DAY, Disp: 90 tablet, Rfl: 0    hydroCHLOROthiazide (HYDRODIURIL) 25 MG tablet, TAKE 1 TABLET(25 MG) BY MOUTH EVERY DAY, Disp: 30 tablet, " Rfl: 11    traZODone (DESYREL) 50 MG tablet, Take 1 tablet (50 mg total) by mouth nightly as needed for Insomnia., Disp: 30 tablet, Rfl: 11    blood sugar diagnostic Strp, To check BG 2 times daily, to use with insurance preferred meter, Disp: 200 each, Rfl: 1    blood-glucose meter kit, To check BG 2 times daily, to use with insurance preferred meter, Disp: 1 each, Rfl: 0    lancets Misc, To check BG 2 times daily, to use with insurance preferred meter, Disp: 200 each, Rfl: 1    Lab Results   Component Value Date    WBC 14.81 (H) 12/12/2020    HGB 12.0 12/12/2020    HCT 38.3 12/12/2020     12/12/2020    ALT 21 08/17/2022    AST 13 08/17/2022     08/17/2022    K 4.0 08/17/2022     08/17/2022    CREATININE 0.7 08/17/2022    BUN 14 08/17/2022    CO2 26 08/17/2022    INR 0.9 12/08/2020    HGBA1C 7.5 (H) 08/17/2022       Review of Systems   Constitutional: Positive for chills and fatigue. Negative for diaphoresis and fever.   HENT: Negative for congestion, hearing loss, rhinorrhea, sinus pressure and sore throat.    Eyes: Positive for visual disturbance (blurry vision intermittently ).   Respiratory: Negative for cough, shortness of breath and wheezing.    Cardiovascular: Negative for chest pain, palpitations and leg swelling.   Gastrointestinal: Positive for diarrhea. Negative for abdominal pain, constipation, nausea and vomiting.   Endocrine: Positive for cold intolerance.   Genitourinary: Negative for difficulty urinating and dysuria.   Musculoskeletal: Negative for arthralgias and myalgias.   Skin: Positive for rash.   Neurological: Positive for headaches. Negative for dizziness and light-headedness.   Hematological: Bruises/bleeds easily.   Psychiatric/Behavioral: Negative for dysphoric mood. The patient is not nervous/anxious.        Objective:      Physical Exam  Constitutional:       Appearance: Normal appearance.   HENT:      Head: Normocephalic and atraumatic.      Nose: Nose normal.       Mouth/Throat:      Mouth: Mucous membranes are moist.   Eyes:      Extraocular Movements: Extraocular movements intact.      Conjunctiva/sclera: Conjunctivae normal.   Cardiovascular:      Rate and Rhythm: Normal rate and regular rhythm.      Heart sounds: Normal heart sounds. No murmur heard.    No gallop.   Pulmonary:      Effort: Pulmonary effort is normal. No respiratory distress.      Breath sounds: Normal breath sounds. No wheezing or rales.   Abdominal:      General: Bowel sounds are normal. There is no distension.      Palpations: Abdomen is soft.      Tenderness: There is no abdominal tenderness. There is no guarding.   Musculoskeletal:         General: Normal range of motion.      Cervical back: Normal range of motion.      Right lower leg: No edema.      Left lower leg: No edema.   Skin:     General: Skin is warm.      Findings: Bruising and rash present. Rash is purpuric.          Neurological:      General: No focal deficit present.      Mental Status: She is alert and oriented to person, place, and time.   Psychiatric:         Mood and Affect: Mood normal.         Behavior: Behavior normal.         Assessment:       1. Cold intolerance    2. Tension-type headache, not intractable, unspecified chronicity pattern    3. Easy bruising    4. Purpura    5. Screening for HIV without presence of risk factors    6. Hyperparathyroidism    7. Hypercalcemia    8. Diarrhea, unspecified type    9. Type 2 diabetes mellitus with hyperglycemia, without long-term current use of insulin        Plan:     Tereza was seen today for diabetes.    Diagnoses and all orders for this visit:    Cold intolerance  -     TSH; Future  Tension-type headache, not intractable, unspecified chronicity pattern  -     TSH; Future  Easy bruising  -     CBC W/ AUTO DIFFERENTIAL; Future  -     APTT; Future  -     IRON AND TIBC; Future  -     FERRITIN; Future  -     RETICULOCYTES; Future  -     PROTIME-INR; Future  Purpura  -     CBC W/ AUTO  DIFFERENTIAL; Future  -     APTT; Future  -     IRON AND TIBC; Future  -     FERRITIN; Future  Screening for HIV without presence of risk factors  -     HIV 1/2 Ag/Ab (4th Gen); Future  Hyperparathyroidism  - Continue to follow up with endocrinology as recommended   Hypercalcemia  -     Basic Metabolic Panel; Future  - Continue medication for calciuria as prescribed by endocrinology  Diarrhea, unspecified type  -     Ambulatory referral/consult to Gastroenterology; Future  - Patient was informed that she needs to attend an appointment with GI due to chronic diarrhea  Type 2 diabetes mellitus with hyperglycemia, without long-term current use of insulin  -     Basic Metabolic Panel; Future  -     blood-glucose meter kit; To check BG 2 times daily, to use with insurance preferred meter  -     lancets Misc; To check BG 2 times daily, to use with insurance preferred meter  -     blood sugar diagnostic Strp; To check BG 2 times daily, to use with insurance preferred meter  -     Ambulatory referral/consult to Diabetes Education; Future  -     POCT Glucose, Hand-Held Device  - Discontinue Metformin and monitor symptom of cold intolerance  - Continue diabetic diet  - Patient was educated on the importance of checking her blood sugar daily and attending diabetes education    Patient was instructed that she will be notified of lab results and any further action that she needs to take once the clinic receives the results.  Follow up in 1 week or sooner if needed.

## 2022-08-24 ENCOUNTER — PATIENT MESSAGE (OUTPATIENT)
Dept: ADMINISTRATIVE | Facility: HOSPITAL | Age: 52
End: 2022-08-24
Payer: COMMERCIAL

## 2022-08-24 LAB
APTT BLDCRRT: 30.2 SEC (ref 21–32)
HIV 1+2 AB+HIV1 P24 AG SERPL QL IA: NEGATIVE
INR PPP: 1 (ref 0.8–1.2)
PROTHROMBIN TIME: 10.4 SEC (ref 9–12.5)

## 2022-08-26 DIAGNOSIS — E11.9 TYPE 2 DIABETES MELLITUS WITHOUT COMPLICATION: ICD-10-CM

## 2022-08-29 ENCOUNTER — PATIENT MESSAGE (OUTPATIENT)
Dept: ADMINISTRATIVE | Facility: HOSPITAL | Age: 52
End: 2022-08-29
Payer: COMMERCIAL

## 2022-08-30 ENCOUNTER — TELEPHONE (OUTPATIENT)
Dept: GASTROENTEROLOGY | Facility: CLINIC | Age: 52
End: 2022-08-30
Payer: COMMERCIAL

## 2022-09-06 ENCOUNTER — TELEPHONE (OUTPATIENT)
Dept: PHARMACY | Facility: CLINIC | Age: 52
End: 2022-09-06
Payer: COMMERCIAL

## 2022-09-06 ENCOUNTER — PATIENT MESSAGE (OUTPATIENT)
Dept: PHARMACY | Facility: CLINIC | Age: 52
End: 2022-09-06
Payer: COMMERCIAL

## 2022-09-06 ENCOUNTER — TELEPHONE (OUTPATIENT)
Dept: FAMILY MEDICINE | Facility: CLINIC | Age: 52
End: 2022-09-06

## 2022-09-06 ENCOUNTER — PATIENT MESSAGE (OUTPATIENT)
Dept: FAMILY MEDICINE | Facility: CLINIC | Age: 52
End: 2022-09-06

## 2022-09-06 ENCOUNTER — OFFICE VISIT (OUTPATIENT)
Dept: FAMILY MEDICINE | Facility: CLINIC | Age: 52
End: 2022-09-06
Payer: COMMERCIAL

## 2022-09-06 ENCOUNTER — TELEPHONE (OUTPATIENT)
Dept: DIABETES | Facility: CLINIC | Age: 52
End: 2022-09-06
Payer: COMMERCIAL

## 2022-09-06 DIAGNOSIS — E11.65 TYPE 2 DIABETES MELLITUS WITH HYPERGLYCEMIA, WITHOUT LONG-TERM CURRENT USE OF INSULIN: Primary | ICD-10-CM

## 2022-09-06 PROCEDURE — 3051F PR MOST RECENT HEMOGLOBIN A1C LEVEL 7.0 - < 8.0%: ICD-10-PCS | Mod: CPTII,95,, | Performed by: PHYSICIAN ASSISTANT

## 2022-09-06 PROCEDURE — 99214 OFFICE O/P EST MOD 30 MIN: CPT | Mod: 95,,, | Performed by: PHYSICIAN ASSISTANT

## 2022-09-06 PROCEDURE — 1160F RVW MEDS BY RX/DR IN RCRD: CPT | Mod: CPTII,95,, | Performed by: PHYSICIAN ASSISTANT

## 2022-09-06 PROCEDURE — 1159F MED LIST DOCD IN RCRD: CPT | Mod: CPTII,95,, | Performed by: PHYSICIAN ASSISTANT

## 2022-09-06 PROCEDURE — 1160F PR REVIEW ALL MEDS BY PRESCRIBER/CLIN PHARMACIST DOCUMENTED: ICD-10-PCS | Mod: CPTII,95,, | Performed by: PHYSICIAN ASSISTANT

## 2022-09-06 PROCEDURE — 99214 PR OFFICE/OUTPT VISIT, EST, LEVL IV, 30-39 MIN: ICD-10-PCS | Mod: 95,,, | Performed by: PHYSICIAN ASSISTANT

## 2022-09-06 PROCEDURE — 3051F HG A1C>EQUAL 7.0%<8.0%: CPT | Mod: CPTII,95,, | Performed by: PHYSICIAN ASSISTANT

## 2022-09-06 PROCEDURE — 1159F PR MEDICATION LIST DOCUMENTED IN MEDICAL RECORD: ICD-10-PCS | Mod: CPTII,95,, | Performed by: PHYSICIAN ASSISTANT

## 2022-09-06 NOTE — PROGRESS NOTES
Subjective:       Patient ID: Tereza Weinstein is a 52 y.o. female.    Chief Complaint: No chief complaint on file.    The patient location is: [place]   The chief complaint leading to consultation is: follow up labs/ diabetes medication    Visit type: audiovisual    Face to Face time with patient: 15 minutes  20 minutes of total time spent on the encounter, which includes face to face time and non-face to face time preparing to see the patient (eg, review of tests), Obtaining and/or reviewing separately obtained history, Documenting clinical information in the electronic or other health record, Independently interpreting results (not separately reported) and communicating results to the patient/family/caregiver, or Care coordination (not separately reported).         Each patient to whom he or she provides medical services by telemedicine is:  (1) informed of the relationship between the physician and patient and the respective role of any other health care provider with respect to management of the patient; and (2) notified that he or she may decline to receive medical services by telemedicine and may withdraw from such care at any time.    Notes:   Ms. Weinstein is a 52 year old female who presents via telemedicine to discuss treatment options for diabetes. She had adverse side effects to metformin (chills). The patient has no new complaints today and reports overall she is feeling well.       Review of patient's allergies indicates:   Allergen Reactions    Penicillins      I was young unsure of reaction           Current Outpatient Medications:     blood sugar diagnostic Strp, To check BG 2 times daily, to use with insurance preferred meter, Disp: 200 each, Rfl: 1    blood-glucose meter kit, To check BG 2 times daily, to use with insurance preferred meter, Disp: 1 each, Rfl: 0    busPIRone (BUSPAR) 5 MG Tab, TAKE 1 TABLET(5 MG) BY MOUTH THREE TIMES DAILY AS NEEDED FOR ANXIETY, Disp: 270 tablet, Rfl: 0     EScitalopram oxalate (LEXAPRO) 10 MG tablet, TAKE 1 TABLET(10 MG) BY MOUTH EVERY DAY, Disp: 90 tablet, Rfl: 0    hydroCHLOROthiazide (HYDRODIURIL) 25 MG tablet, TAKE 1 TABLET(25 MG) BY MOUTH EVERY DAY, Disp: 30 tablet, Rfl: 11    lancets Misc, To check BG 2 times daily, to use with insurance preferred meter, Disp: 200 each, Rfl: 1    semaglutide (OZEMPIC) 0.25 mg or 0.5 mg(2 mg/1.5 mL) pen injector, Inject 0.25 mg into the skin every 7 days for 30 days, THEN 0.5 mg every 7 days., Disp: 1 pen, Rfl: 1    traZODone (DESYREL) 50 MG tablet, Take 1 tablet (50 mg total) by mouth nightly as needed for Insomnia., Disp: 30 tablet, Rfl: 11    Lab Results   Component Value Date    WBC 9.47 08/23/2022    HGB 15.6 08/23/2022    HCT 46.6 08/23/2022     08/23/2022    ALT 21 08/17/2022    AST 13 08/17/2022     08/23/2022    K 3.9 08/23/2022     08/23/2022    CREATININE 0.7 08/23/2022    BUN 11 08/23/2022    CO2 26 08/23/2022    TSH 1.924 08/23/2022    INR 1.0 08/23/2022    HGBA1C 7.5 (H) 08/17/2022       Review of Systems   Constitutional:  Negative for activity change, appetite change and fever.   HENT:  Negative for postnasal drip, rhinorrhea and sinus pressure.    Eyes:  Negative for visual disturbance.   Respiratory:  Negative for cough and shortness of breath.    Cardiovascular:  Negative for chest pain.   Gastrointestinal:  Negative for abdominal distention and abdominal pain.   Genitourinary:  Negative for difficulty urinating and dysuria.   Musculoskeletal:  Negative for arthralgias and myalgias.   Neurological:  Negative for headaches.   Hematological:  Negative for adenopathy.   Psychiatric/Behavioral:  The patient is not nervous/anxious.      Objective:      Physical Exam  Constitutional:       General: She is not in acute distress.     Appearance: Normal appearance.   HENT:      Head: Normocephalic and atraumatic.   Pulmonary:      Effort: Pulmonary effort is normal. No respiratory distress.    Neurological:      Mental Status: She is alert and oriented to person, place, and time.      Cranial Nerves: No cranial nerve deficit.   Psychiatric:         Behavior: Behavior normal.       Assessment:       1. Type 2 diabetes mellitus with hyperglycemia, without long-term current use of insulin          Plan:   Diagnoses and all orders for this visit:    Type 2 diabetes mellitus with hyperglycemia, without long-term current use of insulin  -     semaglutide (OZEMPIC) 0.25 mg or 0.5 mg(2 mg/1.5 mL) pen injector; Inject 0.25 mg into the skin every 7 days for 30 days, THEN 0.5 mg every 7 days.  -     Ambulatory referral/consult to Diabetes Education; Future  Follow up in 1 month.  Discussed possible side effects of new medication  Follow up sooner if needed

## 2022-09-06 NOTE — TELEPHONE ENCOUNTER
See patient message about RX   
Implemented All Fall Risk Interventions:  [place] to call system. Call bell, personal items and telephone within reach. Instruct patient to call for assistance. Room bathroom lighting operational. Non-slip footwear when patient is off stretcher. Physically safe environment: no spills, clutter or unnecessary equipment. Stretcher in lowest position, wheels locked, appropriate side rails in place. Provide visual cue, wrist band, yellow gown, etc. Monitor gait and stability. Monitor for mental status changes and reorient to person, place, and time. Review medications for side effects contributing to fall risk. Reinforce activity limits and safety measures with patient and family.

## 2022-09-08 ENCOUNTER — PATIENT MESSAGE (OUTPATIENT)
Dept: FAMILY MEDICINE | Facility: CLINIC | Age: 52
End: 2022-09-08
Payer: COMMERCIAL

## 2022-09-14 ENCOUNTER — TELEPHONE (OUTPATIENT)
Dept: FAMILY MEDICINE | Facility: CLINIC | Age: 52
End: 2022-09-14
Payer: COMMERCIAL

## 2022-09-14 DIAGNOSIS — E11.65 TYPE 2 DIABETES MELLITUS WITH HYPERGLYCEMIA, WITHOUT LONG-TERM CURRENT USE OF INSULIN: Primary | ICD-10-CM

## 2022-09-14 RX ORDER — GLIMEPIRIDE 1 MG/1
1 TABLET ORAL
Qty: 90 TABLET | Refills: 3 | Status: SHIPPED | OUTPATIENT
Start: 2022-09-14 | End: 2022-10-14

## 2022-09-14 NOTE — TELEPHONE ENCOUNTER
----- Message from Ricky Pabon sent at 9/14/2022  4:22 PM CDT -----  Type: Patient Call Back         Who called: Pt          What is the request in detail: Pt called in regards to medication refilled , she's not sure of the name but states that she came in the office on today due to the cost of medication increased in price and states that she received a coupon from office .  Pt states that the med is still high what should she do ?         Can the clinic reply by [facility]?no          Would the patient rather a call back or a response via [facility]?call back          Best call back number:[phone] (mobile)        Additional Information: Pt is not sure of the Dr she received the Coupon from .            Thank You

## 2022-09-21 DIAGNOSIS — E11.9 TYPE 2 DIABETES MELLITUS WITHOUT COMPLICATION, UNSPECIFIED WHETHER LONG TERM INSULIN USE: ICD-10-CM

## 2022-09-26 ENCOUNTER — PATIENT MESSAGE (OUTPATIENT)
Dept: ADMINISTRATIVE | Facility: HOSPITAL | Age: 52
End: 2022-09-26
Payer: COMMERCIAL

## 2022-09-28 ENCOUNTER — PATIENT OUTREACH (OUTPATIENT)
Dept: ADMINISTRATIVE | Facility: HOSPITAL | Age: 52
End: 2022-09-28
Payer: COMMERCIAL

## 2022-10-06 ENCOUNTER — HOSPITAL ENCOUNTER (OUTPATIENT)
Dept: RADIOLOGY | Facility: CLINIC | Age: 52
Discharge: HOME | End: 2022-10-06
Attending: STUDENT IN AN ORGANIZED HEALTH CARE EDUCATION/TRAINING PROGRAM
Payer: COMMERCIAL

## 2022-10-06 DIAGNOSIS — Z12.31 ENCOUNTER FOR SCREENING MAMMOGRAM FOR MALIGNANT NEOPLASM OF BREAST: ICD-10-CM

## 2022-10-06 PROCEDURE — 77063 BREAST TOMOSYNTHESIS BI: CPT | Mod: TC,PO

## 2022-10-06 PROCEDURE — 77067 SCR MAMMO BI INCL CAD: CPT | Mod: TC,PO

## 2022-10-06 PROCEDURE — 77063 MAMMO DIGITAL SCREENING BILAT WITH TOMO: ICD-10-PCS | Mod: 26,,, | Performed by: RADIOLOGY

## 2022-10-06 PROCEDURE — 77067 MAMMO DIGITAL SCREENING BILAT WITH TOMO: ICD-10-PCS | Mod: 26,,, | Performed by: RADIOLOGY

## 2022-10-06 PROCEDURE — 77067 SCR MAMMO BI INCL CAD: CPT | Mod: 26,,, | Performed by: RADIOLOGY

## 2022-10-06 PROCEDURE — 77063 BREAST TOMOSYNTHESIS BI: CPT | Mod: 26,,, | Performed by: RADIOLOGY

## 2022-10-11 ENCOUNTER — PATIENT MESSAGE (OUTPATIENT)
Dept: ADMINISTRATIVE | Facility: HOSPITAL | Age: 52
End: 2022-10-11
Payer: COMMERCIAL

## 2022-10-14 ENCOUNTER — OFFICE VISIT (OUTPATIENT)
Dept: FAMILY MEDICINE | Facility: CLINIC | Age: 52
End: 2022-10-14
Payer: COMMERCIAL

## 2022-10-14 VITALS
RESPIRATION RATE: 17 BRPM | HEART RATE: 75 BPM | HEIGHT: 69 IN | BODY MASS INDEX: 28.44 KG/M2 | WEIGHT: 192 LBS | OXYGEN SATURATION: 95 % | SYSTOLIC BLOOD PRESSURE: 110 MMHG | DIASTOLIC BLOOD PRESSURE: 68 MMHG | TEMPERATURE: 98 F

## 2022-10-14 DIAGNOSIS — Z12.11 COLON CANCER SCREENING: ICD-10-CM

## 2022-10-14 DIAGNOSIS — F17.200 NICOTINE USE DISORDER: ICD-10-CM

## 2022-10-14 DIAGNOSIS — E11.65 TYPE 2 DIABETES MELLITUS WITH HYPERGLYCEMIA, WITHOUT LONG-TERM CURRENT USE OF INSULIN: Primary | ICD-10-CM

## 2022-10-14 PROCEDURE — 3074F PR MOST RECENT SYSTOLIC BLOOD PRESSURE < 130 MM HG: ICD-10-PCS | Mod: CPTII,S$GLB,, | Performed by: PHYSICIAN ASSISTANT

## 2022-10-14 PROCEDURE — 99999 PR PBB SHADOW E&M-EST. PATIENT-LVL V: ICD-10-PCS | Mod: PBBFAC,,, | Performed by: PHYSICIAN ASSISTANT

## 2022-10-14 PROCEDURE — 3074F SYST BP LT 130 MM HG: CPT | Mod: CPTII,S$GLB,, | Performed by: PHYSICIAN ASSISTANT

## 2022-10-14 PROCEDURE — 3051F PR MOST RECENT HEMOGLOBIN A1C LEVEL 7.0 - < 8.0%: ICD-10-PCS | Mod: CPTII,S$GLB,, | Performed by: PHYSICIAN ASSISTANT

## 2022-10-14 PROCEDURE — 99999 PR PBB SHADOW E&M-EST. PATIENT-LVL V: CPT | Mod: PBBFAC,,, | Performed by: PHYSICIAN ASSISTANT

## 2022-10-14 PROCEDURE — 1159F MED LIST DOCD IN RCRD: CPT | Mod: CPTII,S$GLB,, | Performed by: PHYSICIAN ASSISTANT

## 2022-10-14 PROCEDURE — 3051F HG A1C>EQUAL 7.0%<8.0%: CPT | Mod: CPTII,S$GLB,, | Performed by: PHYSICIAN ASSISTANT

## 2022-10-14 PROCEDURE — 1159F PR MEDICATION LIST DOCUMENTED IN MEDICAL RECORD: ICD-10-PCS | Mod: CPTII,S$GLB,, | Performed by: PHYSICIAN ASSISTANT

## 2022-10-14 PROCEDURE — 3078F PR MOST RECENT DIASTOLIC BLOOD PRESSURE < 80 MM HG: ICD-10-PCS | Mod: CPTII,S$GLB,, | Performed by: PHYSICIAN ASSISTANT

## 2022-10-14 PROCEDURE — 1160F PR REVIEW ALL MEDS BY PRESCRIBER/CLIN PHARMACIST DOCUMENTED: ICD-10-PCS | Mod: CPTII,S$GLB,, | Performed by: PHYSICIAN ASSISTANT

## 2022-10-14 PROCEDURE — 1160F RVW MEDS BY RX/DR IN RCRD: CPT | Mod: CPTII,S$GLB,, | Performed by: PHYSICIAN ASSISTANT

## 2022-10-14 PROCEDURE — 99214 OFFICE O/P EST MOD 30 MIN: CPT | Mod: S$GLB,,, | Performed by: PHYSICIAN ASSISTANT

## 2022-10-14 PROCEDURE — 3078F DIAST BP <80 MM HG: CPT | Mod: CPTII,S$GLB,, | Performed by: PHYSICIAN ASSISTANT

## 2022-10-14 PROCEDURE — 99214 PR OFFICE/OUTPT VISIT, EST, LEVL IV, 30-39 MIN: ICD-10-PCS | Mod: S$GLB,,, | Performed by: PHYSICIAN ASSISTANT

## 2022-10-14 RX ORDER — PRAVASTATIN SODIUM 10 MG/1
10 TABLET ORAL DAILY
Qty: 90 TABLET | Refills: 3 | Status: SHIPPED | OUTPATIENT
Start: 2022-10-14 | End: 2023-08-17 | Stop reason: SDUPTHER

## 2022-10-14 NOTE — PROGRESS NOTES
Subjective:       Patient ID: Tereza Weinstein is a 52 y.o. female.    Chief Complaint: Follow-up (1 month)    Ms Weinstein is a 52 year old female with DM who presents to clinic today for follow up of DM; her blood sugars are significantly improving with ozempic and she is feeling well.  Patient continues to smoke and refuses to quit at this time.     Allergen Reactions    Metformin     Penicillins      I was young unsure of reaction           Current Outpatient Medications:     blood sugar diagnostic Strp, To check BG 2 times daily, to use with insurance preferred meter, Disp: 200 each, Rfl: 1    blood-glucose meter kit, To check BG 2 times daily, to use with insurance preferred meter, Disp: 1 each, Rfl: 0    busPIRone (BUSPAR) 5 MG Tab, TAKE 1 TABLET(5 MG) BY MOUTH THREE TIMES DAILY AS NEEDED FOR ANXIETY, Disp: 270 tablet, Rfl: 0    EScitalopram oxalate (LEXAPRO) 10 MG tablet, TAKE 1 TABLET(10 MG) BY MOUTH EVERY DAY, Disp: 90 tablet, Rfl: 3    hydroCHLOROthiazide (HYDRODIURIL) 25 MG tablet, TAKE 1 TABLET(25 MG) BY MOUTH EVERY DAY, Disp: 30 tablet, Rfl: 11    lancets Misc, To check BG 2 times daily, to use with insurance preferred meter, Disp: 200 each, Rfl: 1    traZODone (DESYREL) 50 MG tablet, Take 1 tablet (50 mg total) by mouth nightly as needed for Insomnia., Disp: 30 tablet, Rfl: 11    semaglutide (OZEMPIC) 0.25 mg or 0.5 mg(2 mg/1.5 mL) pen injector, Inject 0.5 mg into the skin every 7 days., Disp: 1 pen, Rfl: 1    Lab Results   Component Value Date    WBC 9.47 08/23/2022    HGB 15.6 08/23/2022    HCT 46.6 08/23/2022     08/23/2022    ALT 21 08/17/2022    AST 13 08/17/2022     08/23/2022    K 3.9 08/23/2022     08/23/2022    CREATININE 0.7 08/23/2022    BUN 11 08/23/2022    CO2 26 08/23/2022    TSH 1.924 08/23/2022    INR 1.0 08/23/2022    HGBA1C 7.5 (H) 08/17/2022       Review of Systems   Constitutional:  Negative for activity change, appetite change and fever.   HENT:  Positive for  dental problem (under care of dentist). Negative for postnasal drip, rhinorrhea and sinus pressure.    Eyes:  Negative for visual disturbance.   Respiratory:  Negative for cough and shortness of breath.    Cardiovascular:  Negative for chest pain.   Gastrointestinal:  Negative for abdominal distention and abdominal pain.   Genitourinary:  Negative for difficulty urinating and dysuria.   Musculoskeletal:  Negative for arthralgias and myalgias.   Neurological:  Negative for headaches.   Hematological:  Negative for adenopathy.   Psychiatric/Behavioral:  The patient is not nervous/anxious.      Objective:      Physical Exam  Constitutional:       Appearance: Normal appearance.   HENT:      Head: Normocephalic and atraumatic.   Eyes:      Conjunctiva/sclera: Conjunctivae normal.   Cardiovascular:      Rate and Rhythm: Normal rate and regular rhythm.   Pulmonary:      Effort: Pulmonary effort is normal. No respiratory distress.      Breath sounds: Normal breath sounds. No wheezing.   Abdominal:      General: There is no distension.      Palpations: There is no mass.      Tenderness: There is no abdominal tenderness.   Musculoskeletal:      Right lower leg: No edema.      Left lower leg: No edema.   Lymphadenopathy:      Cervical: No cervical adenopathy.   Skin:     Findings: No erythema.   Neurological:      Mental Status: She is alert and oriented to person, place, and time.   Psychiatric:         Behavior: Behavior normal.       Assessment:       1. Type 2 diabetes mellitus with hyperglycemia, without long-term current use of insulin    2. Colon cancer screening    3. Nicotine use disorder          Plan:   Tereza was seen today for follow-up.    Diagnoses and all orders for this visit:    Type 2 diabetes mellitus with hyperglycemia, without long-term current use of insulin  -     semaglutide (OZEMPIC) 0.25 mg or 0.5 mg(2 mg/1.5 mL) pen injector; Inject 0.5 mg into the skin every 7 days.  -     Hemoglobin A1C; Future  -      Cancel: Basic Metabolic Panel; Future  -     Lipid Panel; Future  -     Microalbumin/Creatinine Ratio, Urine; Future  -     Comprehensive Metabolic Panel; Future  -     pravastatin (PRAVACHOL) 10 MG tablet; Take 1 tablet (10 mg total) by mouth once daily.    Colon cancer screening  -     Fecal Immunochemical Test (iFOBT); Future    Nicotine use disorder  Patient refuses to stop smoking at this time.     Repeat labs in 2 months. Follow up after labs.

## 2022-10-14 NOTE — PATIENT INSTRUCTIONS
Josue Starks,     If you are due for any health screening(s) below please notify me so we can arrange them to be ordered and scheduled to maintain your health. Most healthy patients complete it. Don't lose out on improving your health.     Tests to Keep You Healthy    Mammogram: SCHEDULED  Eye Exam: ORDERED BUT NOT SCHEDULED  Colon Cancer Screening: ORDERED  Last HbA1c < 8 (08/17/2022): Yes  Tobacco Cessation: NO      Breast Cancer Screening    Breast cancer is the second most common cancer in women after skin cancer, and the second leading cause of death from cancer after lung cancer. Mammograms can detect breast cancer early, which significantly increases the chances of curing the cancer.      A screening mammogram is an x-ray image of the breasts used for early breast cancer detection. It can help reduce the number of deaths from breast cancer among women. To get a clear image, the breast is placed between two plastic plates to make it flat. How often a mammogram is needed depends on your age and your breast cancer risk.    Although you are still overdue for this important screening, due to the COVID-19 pandemic, we recommend scheduling it in the near future.  Colon Cancer Screening    Of cancers affecting both men and women, colorectal cancer is the third leading cancer killer in the [place]. But it doesnt have to be. Screening can prevent colorectal cancer or find it at an early stage when treatment often leads to a cure.    A colonoscopy is the preferred test for detecting colon cancer. It is needed only once every 10 years if results are negative. While sedated, a flexible, lighted tube with a tiny camera is inserted into the rectum and advanced through the colon to look for cancers. An alternative screening test that is used at home and returned to the lab may also be used. It detects hidden blood in bowel movements which could indicate cancer in the colon. If results are positive, you will need a  colonoscopy to determine if the blood is a sign of cancer. This type of follow up (diagnostic) colonoscopy usually requires additional copays as required by your insurance provider. Please contact your PCP if you have any questions.    Although you are still overdue for this important screening, due to the COVID-19 pandemic, we recommend scheduling it in the near future.       Diabetic Retinal Eye Exam    Diabetes is the #1 cause of blindness in the [place] - early detection before signs or symptoms develop can prevent debilitating blindness.    Once-a-year screening is recommended for all diabetic patients. This exam can prevent and treat diabetes complications in the eye before developing symptoms. This can be done with a special camera is used to take photographs of the back of your eye without having to dilate them, or you can see an eye doctor for a full dilated exam.    Although you are still overdue for this important screening, due to the COVID-19 pandemic, we recommend scheduling it in the near future.

## 2022-10-26 ENCOUNTER — OFFICE VISIT (OUTPATIENT)
Dept: URGENT CARE | Facility: CLINIC | Age: 52
End: 2022-10-26
Payer: COMMERCIAL

## 2022-10-26 VITALS
TEMPERATURE: 98 F | HEART RATE: 86 BPM | WEIGHT: 191 LBS | RESPIRATION RATE: 20 BRPM | DIASTOLIC BLOOD PRESSURE: 75 MMHG | HEIGHT: 69 IN | SYSTOLIC BLOOD PRESSURE: 118 MMHG | BODY MASS INDEX: 28.29 KG/M2 | OXYGEN SATURATION: 97 %

## 2022-10-26 DIAGNOSIS — B37.0 ORAL THRUSH: ICD-10-CM

## 2022-10-26 DIAGNOSIS — K14.6 TONGUE PAIN: Primary | ICD-10-CM

## 2022-10-26 DIAGNOSIS — K14.0 TRANSIENT LINGUAL PAPILLITIS: ICD-10-CM

## 2022-10-26 PROCEDURE — 3074F PR MOST RECENT SYSTOLIC BLOOD PRESSURE < 130 MM HG: ICD-10-PCS | Mod: CPTII,S$GLB,,

## 2022-10-26 PROCEDURE — 3078F PR MOST RECENT DIASTOLIC BLOOD PRESSURE < 80 MM HG: ICD-10-PCS | Mod: CPTII,S$GLB,,

## 2022-10-26 PROCEDURE — 1159F PR MEDICATION LIST DOCUMENTED IN MEDICAL RECORD: ICD-10-PCS | Mod: CPTII,S$GLB,,

## 2022-10-26 PROCEDURE — 3051F HG A1C>EQUAL 7.0%<8.0%: CPT | Mod: CPTII,S$GLB,,

## 2022-10-26 PROCEDURE — 99213 PR OFFICE/OUTPT VISIT, EST, LEVL III, 20-29 MIN: ICD-10-PCS | Mod: S$GLB,,,

## 2022-10-26 PROCEDURE — 3078F DIAST BP <80 MM HG: CPT | Mod: CPTII,S$GLB,,

## 2022-10-26 PROCEDURE — 3074F SYST BP LT 130 MM HG: CPT | Mod: CPTII,S$GLB,,

## 2022-10-26 PROCEDURE — 1159F MED LIST DOCD IN RCRD: CPT | Mod: CPTII,S$GLB,,

## 2022-10-26 PROCEDURE — 99213 OFFICE O/P EST LOW 20 MIN: CPT | Mod: S$GLB,,,

## 2022-10-26 PROCEDURE — 3051F PR MOST RECENT HEMOGLOBIN A1C LEVEL 7.0 - < 8.0%: ICD-10-PCS | Mod: CPTII,S$GLB,,

## 2022-10-26 RX ORDER — NYSTATIN 100000 [USP'U]/ML
4 SUSPENSION ORAL 4 TIMES DAILY
Qty: 112 ML | Refills: 0 | Status: SHIPPED | OUTPATIENT
Start: 2022-10-26 | End: 2022-11-02

## 2022-10-26 RX ORDER — DIPHENHYDRAMINE HCL 25 MG
TABLET ORAL
COMMUNITY
Start: 2022-08-23

## 2022-10-26 NOTE — PATIENT INSTRUCTIONS
Follow up with your dentist in the next 2-5 days.     Start nystantin swish and spit as directed.    Avoid spicy foods.     Go to the ER for any fever, increase pain, trouble swallowing, voice changes or worsening of symptoms.

## 2022-10-27 NOTE — PROGRESS NOTES
"Subjective:       Patient ID: Tereza Weinstein is a 52 y.o. female.    Vitals:  height is 5' 9" (1.753 m) and weight is 86.6 kg (191 lb). Her temperature is 97.6 °F (36.4 °C). Her blood pressure is 118/75 and her pulse is 86. Her respiration is 20 and oxygen saturation is 97%.     Chief Complaint: Mouth Lesions (Dental surgery x 3 weeks and blisters started approximately 1 week later. )    Here for tongue pain, reports tongue is sensitive to foods and liquids.     Mouth Lesions   Associated symptoms include mouth sores. Pertinent negatives include no fever, no abdominal pain, no ear pain, no sore throat, no neck pain and no cough.     Constitution: Negative for activity change, appetite change, chills, sweating and fever.   HENT:  Positive for mouth sores. Negative for ear pain, sinus pain, sinus pressure and sore throat.    Neck: Negative for neck pain.   Cardiovascular:  Negative for chest pain.   Eyes:  Negative for blurred vision.   Respiratory:  Negative for chest tightness, cough and shortness of breath.    Gastrointestinal:  Negative for abdominal pain.   Neurological:  Negative for dizziness and history of vertigo.     Objective:      Physical Exam   Constitutional:  Non-toxic appearance. She does not appear ill. No distress.   HENT:   Head: Normocephalic.   Mouth/Throat: Uvula is midline, oropharynx is clear and moist and mucous membranes are normal. No trismus in the jaw. Abnormal dentition. No dental abscesses or uvula swelling. No posterior oropharyngeal erythema or tonsillar abscesses. Tonsils are 1+ on the right. Tonsils are 1+ on the left. No tonsillar exudate.   Eyes: Conjunctivae are normal. Extraocular movement intact   Cardiovascular: Normal rate, normal heart sounds and normal pulses.   Pulmonary/Chest: Effort normal and breath sounds normal. No respiratory distress. She has no wheezes.   Neurological: no focal deficit. She is alert.   Skin: Skin is not diaphoretic. Capillary refill takes 2 to " 3 seconds.   Psychiatric: Her behavior is normal. Mood normal.     Thrush noted to tongue. No PTA on exam.   Assessment:       1. Tongue pain    2. Oral thrush    3. Transient lingual papillitis            Plan:         Tongue pain  -     nystatin (MYCOSTATIN) 100,000 unit/mL suspension; Take 4 mLs (400,000 Units total) by mouth 4 (four) times daily. for 7 days  Dispense: 112 mL; Refill: 0    Oral thrush  -     nystatin (MYCOSTATIN) 100,000 unit/mL suspension; Take 4 mLs (400,000 Units total) by mouth 4 (four) times daily. for 7 days  Dispense: 112 mL; Refill: 0    Transient lingual papillitis       Patient presents with tongue pain that is sensitive to foods and liquids, patient had tooth procedure 2 weeks ago were a temporary bridge was placed, she finished antibiotics this past Friday, she is not having any tooth pain, no abscess noted on exam, she does appear to have oral thrush, tongue pain sensitive to food and presentation consistent with transient lingual papillitis, educated on diet changes, informed pt to fu with dentist this week, nystatin ordered for thrush.

## 2022-10-31 ENCOUNTER — TELEPHONE (OUTPATIENT)
Dept: DIABETES | Facility: CLINIC | Age: 52
End: 2022-10-31
Payer: COMMERCIAL

## 2022-11-03 ENCOUNTER — PATIENT MESSAGE (OUTPATIENT)
Dept: ADMINISTRATIVE | Facility: HOSPITAL | Age: 52
End: 2022-11-03
Payer: COMMERCIAL

## 2022-11-11 ENCOUNTER — PATIENT OUTREACH (OUTPATIENT)
Dept: ADMINISTRATIVE | Facility: HOSPITAL | Age: 52
End: 2022-11-11
Payer: COMMERCIAL

## 2022-11-11 NOTE — PROGRESS NOTES
Diabetic eye exam GAP report-I spoke to pt regarding her overdue Diabetic eye exam and she stated she will schedule and let us know.

## 2022-11-17 ENCOUNTER — LAB VISIT (OUTPATIENT)
Dept: LAB | Facility: HOSPITAL | Age: 52
End: 2022-11-17
Payer: COMMERCIAL

## 2022-11-17 DIAGNOSIS — E11.65 TYPE 2 DIABETES MELLITUS WITH HYPERGLYCEMIA, WITHOUT LONG-TERM CURRENT USE OF INSULIN: ICD-10-CM

## 2022-11-17 LAB
ALBUMIN SERPL BCP-MCNC: 3.8 G/DL (ref 3.5–5.2)
ALP SERPL-CCNC: 88 U/L (ref 55–135)
ALT SERPL W/O P-5'-P-CCNC: 13 U/L (ref 10–44)
ANION GAP SERPL CALC-SCNC: 8 MMOL/L (ref 8–16)
AST SERPL-CCNC: 11 U/L (ref 10–40)
BILIRUB SERPL-MCNC: 0.4 MG/DL (ref 0.1–1)
BUN SERPL-MCNC: 11 MG/DL (ref 6–20)
CALCIUM SERPL-MCNC: 9.8 MG/DL (ref 8.7–10.5)
CHLORIDE SERPL-SCNC: 108 MMOL/L (ref 95–110)
CO2 SERPL-SCNC: 24 MMOL/L (ref 23–29)
CREAT SERPL-MCNC: 0.8 MG/DL (ref 0.5–1.4)
EST. GFR  (NO RACE VARIABLE): >60 ML/MIN/1.73 M^2
ESTIMATED AVG GLUCOSE: 137 MG/DL (ref 68–131)
GLUCOSE SERPL-MCNC: 112 MG/DL (ref 70–110)
HBA1C MFR BLD: 6.4 % (ref 4–5.6)
POTASSIUM SERPL-SCNC: 4.4 MMOL/L (ref 3.5–5.1)
PROT SERPL-MCNC: 6.1 G/DL (ref 6–8.4)
SODIUM SERPL-SCNC: 140 MMOL/L (ref 136–145)

## 2022-11-17 PROCEDURE — 83036 HEMOGLOBIN GLYCOSYLATED A1C: CPT

## 2022-11-17 PROCEDURE — 36415 COLL VENOUS BLD VENIPUNCTURE: CPT | Mod: PO

## 2022-11-17 PROCEDURE — 80053 COMPREHEN METABOLIC PANEL: CPT

## 2022-11-21 ENCOUNTER — PATIENT MESSAGE (OUTPATIENT)
Dept: ADMINISTRATIVE | Facility: HOSPITAL | Age: 52
End: 2022-11-21
Payer: COMMERCIAL

## 2022-12-06 ENCOUNTER — PATIENT MESSAGE (OUTPATIENT)
Dept: ADMINISTRATIVE | Facility: HOSPITAL | Age: 52
End: 2022-12-06
Payer: COMMERCIAL

## 2022-12-06 DIAGNOSIS — E11.65 TYPE 2 DIABETES MELLITUS WITH HYPERGLYCEMIA, WITHOUT LONG-TERM CURRENT USE OF INSULIN: ICD-10-CM

## 2022-12-07 RX ORDER — TIRZEPATIDE 2.5 MG/.5ML
2.5 INJECTION, SOLUTION SUBCUTANEOUS
Qty: 4 PEN | Refills: 0 | OUTPATIENT
Start: 2022-12-07

## 2022-12-17 ENCOUNTER — LAB VISIT (OUTPATIENT)
Dept: LAB | Facility: HOSPITAL | Age: 52
End: 2022-12-17
Attending: PHYSICIAN ASSISTANT
Payer: COMMERCIAL

## 2022-12-17 DIAGNOSIS — E11.65 TYPE 2 DIABETES MELLITUS WITH HYPERGLYCEMIA, WITHOUT LONG-TERM CURRENT USE OF INSULIN: ICD-10-CM

## 2022-12-17 LAB
ALBUMIN SERPL BCP-MCNC: 4 G/DL (ref 3.5–5.2)
ALP SERPL-CCNC: 95 U/L (ref 55–135)
ALT SERPL W/O P-5'-P-CCNC: 15 U/L (ref 10–44)
ANION GAP SERPL CALC-SCNC: 10 MMOL/L (ref 8–16)
AST SERPL-CCNC: 13 U/L (ref 10–40)
BILIRUB SERPL-MCNC: 0.5 MG/DL (ref 0.1–1)
BUN SERPL-MCNC: 15 MG/DL (ref 6–20)
CALCIUM SERPL-MCNC: 10 MG/DL (ref 8.7–10.5)
CHLORIDE SERPL-SCNC: 105 MMOL/L (ref 95–110)
CHOLEST SERPL-MCNC: 185 MG/DL (ref 120–199)
CHOLEST/HDLC SERPL: 3.6 {RATIO} (ref 2–5)
CO2 SERPL-SCNC: 25 MMOL/L (ref 23–29)
CREAT SERPL-MCNC: 0.8 MG/DL (ref 0.5–1.4)
EST. GFR  (NO RACE VARIABLE): >60 ML/MIN/1.73 M^2
ESTIMATED AVG GLUCOSE: 123 MG/DL (ref 68–131)
GLUCOSE SERPL-MCNC: 109 MG/DL (ref 70–110)
HBA1C MFR BLD: 5.9 % (ref 4–5.6)
HDLC SERPL-MCNC: 52 MG/DL (ref 40–75)
HDLC SERPL: 28.1 % (ref 20–50)
LDLC SERPL CALC-MCNC: 117.4 MG/DL (ref 63–159)
NONHDLC SERPL-MCNC: 133 MG/DL
POTASSIUM SERPL-SCNC: 5 MMOL/L (ref 3.5–5.1)
PROT SERPL-MCNC: 6.4 G/DL (ref 6–8.4)
SODIUM SERPL-SCNC: 140 MMOL/L (ref 136–145)
TRIGL SERPL-MCNC: 78 MG/DL (ref 30–150)

## 2022-12-17 PROCEDURE — 36415 COLL VENOUS BLD VENIPUNCTURE: CPT | Mod: PO | Performed by: PHYSICIAN ASSISTANT

## 2022-12-17 PROCEDURE — 80061 LIPID PANEL: CPT | Performed by: PHYSICIAN ASSISTANT

## 2022-12-17 PROCEDURE — 83036 HEMOGLOBIN GLYCOSYLATED A1C: CPT | Performed by: PHYSICIAN ASSISTANT

## 2022-12-17 PROCEDURE — 80053 COMPREHEN METABOLIC PANEL: CPT | Performed by: PHYSICIAN ASSISTANT

## 2022-12-21 ENCOUNTER — OFFICE VISIT (OUTPATIENT)
Dept: FAMILY MEDICINE | Facility: CLINIC | Age: 52
End: 2022-12-21
Payer: COMMERCIAL

## 2022-12-21 VITALS
DIASTOLIC BLOOD PRESSURE: 68 MMHG | HEART RATE: 74 BPM | RESPIRATION RATE: 17 BRPM | SYSTOLIC BLOOD PRESSURE: 130 MMHG | HEIGHT: 69 IN | OXYGEN SATURATION: 96 % | WEIGHT: 191.81 LBS | TEMPERATURE: 98 F | BODY MASS INDEX: 28.41 KG/M2

## 2022-12-21 DIAGNOSIS — F32.A ANXIETY AND DEPRESSION: ICD-10-CM

## 2022-12-21 DIAGNOSIS — F41.9 ANXIETY AND DEPRESSION: ICD-10-CM

## 2022-12-21 DIAGNOSIS — F17.200 NICOTINE USE DISORDER: ICD-10-CM

## 2022-12-21 DIAGNOSIS — K59.00 CONSTIPATION, UNSPECIFIED CONSTIPATION TYPE: ICD-10-CM

## 2022-12-21 DIAGNOSIS — E11.65 TYPE 2 DIABETES MELLITUS WITH HYPERGLYCEMIA, WITHOUT LONG-TERM CURRENT USE OF INSULIN: Primary | ICD-10-CM

## 2022-12-21 PROCEDURE — 3061F NEG MICROALBUMINURIA REV: CPT | Mod: CPTII,S$GLB,, | Performed by: PHYSICIAN ASSISTANT

## 2022-12-21 PROCEDURE — 99999 PR PBB SHADOW E&M-EST. PATIENT-LVL IV: ICD-10-PCS | Mod: PBBFAC,,, | Performed by: PHYSICIAN ASSISTANT

## 2022-12-21 PROCEDURE — 99214 PR OFFICE/OUTPT VISIT, EST, LEVL IV, 30-39 MIN: ICD-10-PCS | Mod: S$GLB,,, | Performed by: PHYSICIAN ASSISTANT

## 2022-12-21 PROCEDURE — 3075F SYST BP GE 130 - 139MM HG: CPT | Mod: CPTII,S$GLB,, | Performed by: PHYSICIAN ASSISTANT

## 2022-12-21 PROCEDURE — 3066F PR DOCUMENTATION OF TREATMENT FOR NEPHROPATHY: ICD-10-PCS | Mod: CPTII,S$GLB,, | Performed by: PHYSICIAN ASSISTANT

## 2022-12-21 PROCEDURE — 1159F MED LIST DOCD IN RCRD: CPT | Mod: CPTII,S$GLB,, | Performed by: PHYSICIAN ASSISTANT

## 2022-12-21 PROCEDURE — 3008F BODY MASS INDEX DOCD: CPT | Mod: CPTII,S$GLB,, | Performed by: PHYSICIAN ASSISTANT

## 2022-12-21 PROCEDURE — 3044F HG A1C LEVEL LT 7.0%: CPT | Mod: CPTII,S$GLB,, | Performed by: PHYSICIAN ASSISTANT

## 2022-12-21 PROCEDURE — 1160F RVW MEDS BY RX/DR IN RCRD: CPT | Mod: CPTII,S$GLB,, | Performed by: PHYSICIAN ASSISTANT

## 2022-12-21 PROCEDURE — 3078F PR MOST RECENT DIASTOLIC BLOOD PRESSURE < 80 MM HG: ICD-10-PCS | Mod: CPTII,S$GLB,, | Performed by: PHYSICIAN ASSISTANT

## 2022-12-21 PROCEDURE — 3044F PR MOST RECENT HEMOGLOBIN A1C LEVEL <7.0%: ICD-10-PCS | Mod: CPTII,S$GLB,, | Performed by: PHYSICIAN ASSISTANT

## 2022-12-21 PROCEDURE — 3061F PR NEG MICROALBUMINURIA RESULT DOCUMENTED/REVIEW: ICD-10-PCS | Mod: CPTII,S$GLB,, | Performed by: PHYSICIAN ASSISTANT

## 2022-12-21 PROCEDURE — 1160F PR REVIEW ALL MEDS BY PRESCRIBER/CLIN PHARMACIST DOCUMENTED: ICD-10-PCS | Mod: CPTII,S$GLB,, | Performed by: PHYSICIAN ASSISTANT

## 2022-12-21 PROCEDURE — 3078F DIAST BP <80 MM HG: CPT | Mod: CPTII,S$GLB,, | Performed by: PHYSICIAN ASSISTANT

## 2022-12-21 PROCEDURE — 3066F NEPHROPATHY DOC TX: CPT | Mod: CPTII,S$GLB,, | Performed by: PHYSICIAN ASSISTANT

## 2022-12-21 PROCEDURE — 1159F PR MEDICATION LIST DOCUMENTED IN MEDICAL RECORD: ICD-10-PCS | Mod: CPTII,S$GLB,, | Performed by: PHYSICIAN ASSISTANT

## 2022-12-21 PROCEDURE — 99999 PR PBB SHADOW E&M-EST. PATIENT-LVL IV: CPT | Mod: PBBFAC,,, | Performed by: PHYSICIAN ASSISTANT

## 2022-12-21 PROCEDURE — 3075F PR MOST RECENT SYSTOLIC BLOOD PRESS GE 130-139MM HG: ICD-10-PCS | Mod: CPTII,S$GLB,, | Performed by: PHYSICIAN ASSISTANT

## 2022-12-21 PROCEDURE — 3008F PR BODY MASS INDEX (BMI) DOCUMENTED: ICD-10-PCS | Mod: CPTII,S$GLB,, | Performed by: PHYSICIAN ASSISTANT

## 2022-12-21 PROCEDURE — 99214 OFFICE O/P EST MOD 30 MIN: CPT | Mod: S$GLB,,, | Performed by: PHYSICIAN ASSISTANT

## 2022-12-21 RX ORDER — ESCITALOPRAM OXALATE 20 MG/1
20 TABLET ORAL DAILY
Qty: 90 TABLET | Refills: 1 | Status: SHIPPED | OUTPATIENT
Start: 2022-12-21 | End: 2023-08-17 | Stop reason: SDUPTHER

## 2022-12-21 RX ORDER — LACTULOSE 10 G/15ML
10 SOLUTION ORAL; RECTAL 2 TIMES DAILY
Qty: 237 ML | Refills: 0 | Status: SHIPPED | OUTPATIENT
Start: 2022-12-21 | End: 2022-12-27

## 2022-12-21 NOTE — PROGRESS NOTES
Subjective:       Patient ID: Tereza Weinstein is a 52 y.o. female.    Chief Complaint: Follow-up    Ms. Weinstein is a 52 year old female with DM who presents to clinic today for routine follow up. Recent labs revealed improvement in blood sugar control. The patient does admit to increased anxiety and is interested in increasing her lexapro dose.   She does still smoke and is not ready to quit at this time. She does complain of constipation despite trying stool softener and miralax.     Review of patient's allergies indicates:   Allergen Reactions    Metformin     Penicillins      I was young unsure of reaction           Current Outpatient Medications:     blood sugar diagnostic Strp, To check BG 2 times daily, to use with insurance preferred meter, Disp: 200 each, Rfl: 1    busPIRone (BUSPAR) 5 MG Tab, TAKE 1 TABLET(5 MG) BY MOUTH THREE TIMES DAILY AS NEEDED FOR ANXIETY, Disp: 270 tablet, Rfl: 0    hydroCHLOROthiazide (HYDRODIURIL) 25 MG tablet, TAKE 1 TABLET(25 MG) BY MOUTH EVERY DAY (Patient taking differently: Take 25 mg by mouth 2 (two) times daily.), Disp: 30 tablet, Rfl: 11    lancets Misc, To check BG 2 times daily, to use with insurance preferred meter, Disp: 200 each, Rfl: 1    pravastatin (PRAVACHOL) 10 MG tablet, Take 1 tablet (10 mg total) by mouth once daily., Disp: 90 tablet, Rfl: 3    traZODone (DESYREL) 50 MG tablet, Take 1 tablet (50 mg total) by mouth nightly as needed for Insomnia., Disp: 30 tablet, Rfl: 11    TRUE METRIX AIR GLUCOSE METER Misc, USE AS DIRECTED TO CHECK BLOOD GLUCOSE TWICE DAILY, Disp: , Rfl:     EScitalopram oxalate (LEXAPRO) 20 MG tablet, Take 1 tablet (20 mg total) by mouth once daily., Disp: 90 tablet, Rfl: 1    lactulose (CHRONULAC) 10 gram/15 mL solution, Take 15 mLs (10 g total) by mouth 2 (two) times daily., Disp: 237 mL, Rfl: 0    tirzepatide 5 mg/0.5 mL PnIj, Inject 5 mg into the skin every 7 days., Disp: 4 pen, Rfl: 0    Lab Results   Component Value Date    WBC 9.47  08/23/2022    HGB 15.6 08/23/2022    HCT 46.6 08/23/2022     08/23/2022    CHOL 185 12/17/2022    TRIG 78 12/17/2022    HDL 52 12/17/2022    ALT 15 12/17/2022    AST 13 12/17/2022     12/17/2022    K 5.0 12/17/2022     12/17/2022    CREATININE 0.8 12/17/2022    BUN 15 12/17/2022    CO2 25 12/17/2022    TSH 1.924 08/23/2022    INR 1.0 08/23/2022    HGBA1C 5.9 (H) 12/17/2022       Review of Systems   Constitutional:  Negative for activity change, appetite change and fever.   HENT:  Negative for postnasal drip, rhinorrhea and sinus pressure.    Eyes:  Negative for visual disturbance.   Respiratory:  Negative for cough and shortness of breath.    Cardiovascular:  Negative for chest pain.   Gastrointestinal:  Negative for abdominal distention and abdominal pain.   Genitourinary:  Negative for difficulty urinating and dysuria.   Musculoskeletal:  Negative for arthralgias and myalgias.   Neurological:  Negative for headaches.   Hematological:  Negative for adenopathy.   Psychiatric/Behavioral:  The patient is nervous/anxious.      Objective:      Physical Exam  Constitutional:       Appearance: Normal appearance.   HENT:      Head: Normocephalic and atraumatic.   Eyes:      Conjunctiva/sclera: Conjunctivae normal.   Cardiovascular:      Rate and Rhythm: Normal rate and regular rhythm.   Pulmonary:      Effort: Pulmonary effort is normal. No respiratory distress.      Breath sounds: Normal breath sounds. No wheezing.   Abdominal:      General: There is no distension.      Palpations: There is no mass.      Tenderness: There is no abdominal tenderness.   Musculoskeletal:      Right lower leg: No edema.      Left lower leg: No edema.   Skin:     Findings: No erythema.   Neurological:      Mental Status: She is alert and oriented to person, place, and time.   Psychiatric:         Behavior: Behavior normal.       Assessment:       1. Type 2 diabetes mellitus with hyperglycemia, without long-term current use  of insulin    2. Nicotine use disorder    3. Constipation, unspecified constipation type    4. Anxiety and depression          Plan:       Tereza was seen today for follow-up.    Diagnoses and all orders for this visit:    Type 2 diabetes mellitus with hyperglycemia, without long-term current use of insulin  -     Hemoglobin A1C; Future  -     Comprehensive Metabolic Panel; Future  -     tirzepatide 5 mg/0.5 mL PnIj; Inject 5 mg into the skin every 7 days.  Diabetic diet  Continue current medication  Labs in 6 months follow up with PCP in 6 months.   Nicotine use disorder  Patient not ready to quit at this time  Constipation, unspecified constipation type  -     lactulose (CHRONULAC) 10 gram/15 mL solution; Take 15 mLs (10 g total) by mouth 2 (two) times daily.    Anxiety and depression  -     EScitalopram oxalate (LEXAPRO) 20 MG tablet; Take 1 tablet (20 mg total) by mouth once daily.  Increase lexapro  Follow up in 6 weeks via portal to give update on how patient is doing with anxiety

## 2023-01-10 ENCOUNTER — OCCUPATIONAL HEALTH (OUTPATIENT)
Dept: URGENT CARE | Facility: CLINIC | Age: 53
End: 2023-01-10
Payer: COMMERCIAL

## 2023-01-10 DIAGNOSIS — Z00.00 ENCOUNTER FOR PHYSICAL EXAMINATION: Primary | ICD-10-CM

## 2023-01-10 PROCEDURE — 80053 COMPREHEN METABOLIC PANEL: CPT | Mod: QW,,, | Performed by: PHYSICIAN ASSISTANT

## 2023-01-10 PROCEDURE — 99172 OCULAR FUNCTION SCREEN: CPT | Mod: ,,, | Performed by: PHYSICIAN ASSISTANT

## 2023-01-10 PROCEDURE — 99080 OSHA QUESTIONNAIRE: ICD-10-PCS | Mod: ,,, | Performed by: PHYSICIAN ASSISTANT

## 2023-01-10 PROCEDURE — 93000 EKG 12-LEAD: ICD-10-PCS | Mod: ,,, | Performed by: INTERNAL MEDICINE

## 2023-01-10 PROCEDURE — 99499 PHYSICAL, BASIC COMPLEXITY: ICD-10-PCS | Mod: ,,, | Performed by: PHYSICIAN ASSISTANT

## 2023-01-10 PROCEDURE — 99172 VISUAL SCREEN, AUTOMATED W/COLOR VISION: ICD-10-PCS | Mod: ,,, | Performed by: PHYSICIAN ASSISTANT

## 2023-01-10 PROCEDURE — 92552 PURE TONE AUDIOMETRY AIR: CPT | Mod: ,,, | Performed by: PHYSICIAN ASSISTANT

## 2023-01-10 PROCEDURE — 92552 AUDIOGRAM OCC MED: ICD-10-PCS | Mod: ,,, | Performed by: PHYSICIAN ASSISTANT

## 2023-01-10 PROCEDURE — 93000 ELECTROCARDIOGRAM COMPLETE: CPT | Mod: ,,, | Performed by: INTERNAL MEDICINE

## 2023-01-10 PROCEDURE — 99499 UNLISTED E&M SERVICE: CPT | Mod: ,,, | Performed by: PHYSICIAN ASSISTANT

## 2023-01-10 PROCEDURE — 80053 COMPREHENSIVE METABOLIC PANEL: ICD-10-PCS | Mod: QW,,, | Performed by: PHYSICIAN ASSISTANT

## 2023-01-10 PROCEDURE — 99080 SPECIAL REPORTS OR FORMS: CPT | Mod: ,,, | Performed by: PHYSICIAN ASSISTANT

## 2023-01-17 ENCOUNTER — PATIENT MESSAGE (OUTPATIENT)
Dept: ADMINISTRATIVE | Facility: HOSPITAL | Age: 53
End: 2023-01-17
Payer: COMMERCIAL

## 2023-01-25 ENCOUNTER — PATIENT OUTREACH (OUTPATIENT)
Dept: ADMINISTRATIVE | Facility: HOSPITAL | Age: 53
End: 2023-01-25
Payer: COMMERCIAL

## 2023-01-25 NOTE — PROGRESS NOTES
Diabetic eye exam GAP report-I spoke to pt and she stated she had her eyes checked at [facility]. Left  for employee health to call back.

## 2023-02-01 ENCOUNTER — TELEPHONE (OUTPATIENT)
Dept: FAMILY MEDICINE | Facility: CLINIC | Age: 53
End: 2023-02-01

## 2023-02-01 NOTE — TELEPHONE ENCOUNTER
Patient was able to reschedule her new patient appointment to tomorrow incorrectly. I called to cancel the appointment as it was not appropriate, she wants a call back to see if there are any new patient cancellations as she feels she needs to be seen sooner than April.

## 2023-02-14 DIAGNOSIS — E11.65 TYPE 2 DIABETES MELLITUS WITH HYPERGLYCEMIA, WITHOUT LONG-TERM CURRENT USE OF INSULIN: ICD-10-CM

## 2023-02-14 RX ORDER — TIRZEPATIDE 5 MG/.5ML
5 INJECTION, SOLUTION SUBCUTANEOUS
Qty: 4 PEN | Refills: 0 | Status: SHIPPED | OUTPATIENT
Start: 2023-02-14 | End: 2023-04-17

## 2023-02-27 ENCOUNTER — PATIENT MESSAGE (OUTPATIENT)
Dept: ADMINISTRATIVE | Facility: HOSPITAL | Age: 53
End: 2023-02-27
Payer: COMMERCIAL

## 2023-03-08 ENCOUNTER — PATIENT MESSAGE (OUTPATIENT)
Dept: ADMINISTRATIVE | Facility: HOSPITAL | Age: 53
End: 2023-03-08
Payer: COMMERCIAL

## 2023-03-27 ENCOUNTER — PATIENT MESSAGE (OUTPATIENT)
Dept: ADMINISTRATIVE | Facility: HOSPITAL | Age: 53
End: 2023-03-27
Payer: COMMERCIAL

## 2023-04-03 ENCOUNTER — PATIENT OUTREACH (OUTPATIENT)
Dept: ADMINISTRATIVE | Facility: HOSPITAL | Age: 53
End: 2023-04-03
Payer: COMMERCIAL

## 2023-04-03 ENCOUNTER — PATIENT MESSAGE (OUTPATIENT)
Dept: ADMINISTRATIVE | Facility: HOSPITAL | Age: 53
End: 2023-04-03
Payer: COMMERCIAL

## 2023-04-03 NOTE — PROGRESS NOTES
Chart review completed 2023.  Care Everywhere updated and reviewed. [facility] and [facility] reviewed. No new HM items found.    Foot Exam hyperlinked in HM.     Patient portal message sent regarding overdue HM      Orders reviewed for any that may be . No orders removed     Immunizations reviewed.

## 2023-04-11 ENCOUNTER — PATIENT MESSAGE (OUTPATIENT)
Dept: ADMINISTRATIVE | Facility: HOSPITAL | Age: 53
End: 2023-04-11
Payer: COMMERCIAL

## 2023-04-17 ENCOUNTER — OFFICE VISIT (OUTPATIENT)
Dept: FAMILY MEDICINE | Facility: CLINIC | Age: 53
End: 2023-04-17
Payer: COMMERCIAL

## 2023-04-17 VITALS
HEIGHT: 69 IN | DIASTOLIC BLOOD PRESSURE: 86 MMHG | HEART RATE: 100 BPM | SYSTOLIC BLOOD PRESSURE: 136 MMHG | BODY MASS INDEX: 29.33 KG/M2 | WEIGHT: 198 LBS

## 2023-04-17 DIAGNOSIS — Z79.899 ENCOUNTER FOR LONG-TERM (CURRENT) USE OF OTHER MEDICATIONS: ICD-10-CM

## 2023-04-17 DIAGNOSIS — E11.65 TYPE 2 DIABETES MELLITUS WITH HYPERGLYCEMIA, WITHOUT LONG-TERM CURRENT USE OF INSULIN: Primary | ICD-10-CM

## 2023-04-17 DIAGNOSIS — F32.A ANXIETY AND DEPRESSION: ICD-10-CM

## 2023-04-17 DIAGNOSIS — N20.0 NEPHROLITHIASIS: ICD-10-CM

## 2023-04-17 DIAGNOSIS — E21.3 HYPERPARATHYROIDISM: ICD-10-CM

## 2023-04-17 DIAGNOSIS — F41.9 ANXIETY AND DEPRESSION: ICD-10-CM

## 2023-04-17 LAB — HBA1C MFR BLD: 6.1 %

## 2023-04-17 PROCEDURE — 83036 POCT HEMOGLOBIN A1C: ICD-10-PCS | Mod: QW,,, | Performed by: PHYSICIAN ASSISTANT

## 2023-04-17 PROCEDURE — 99204 OFFICE O/P NEW MOD 45 MIN: CPT | Mod: S$GLB,,, | Performed by: PHYSICIAN ASSISTANT

## 2023-04-17 PROCEDURE — 83036 HEMOGLOBIN GLYCOSYLATED A1C: CPT | Mod: QW,,, | Performed by: PHYSICIAN ASSISTANT

## 2023-04-17 PROCEDURE — 99204 PR OFFICE/OUTPT VISIT, NEW, LEVL IV, 45-59 MIN: ICD-10-PCS | Mod: S$GLB,,, | Performed by: PHYSICIAN ASSISTANT

## 2023-04-17 RX ORDER — GLIMEPIRIDE 1 MG/1
TABLET ORAL
COMMUNITY
Start: 2023-01-15 | End: 2023-04-17 | Stop reason: SDUPTHER

## 2023-04-17 RX ORDER — GLIMEPIRIDE 1 MG/1
1 TABLET ORAL
Qty: 90 TABLET | Refills: 1 | Status: SHIPPED | OUTPATIENT
Start: 2023-04-17 | End: 2023-08-17

## 2023-04-17 NOTE — PROGRESS NOTES
SUBJECTIVE:    Patient ID: Tereza Weinstein is a 53 y.o. female.    Chief Complaint: Establish Care (Went over meds verbally// SW)    This is a 53-year-old female who presents today to establish care.  Previously a patient at the [facility] across town. Dr. Inman, PCP.  She carries a past medical history significant for type 2 diabetes and renal stones.  She is treated for mood/anxiety, insomnia.  She reports being well managed on all fronts.  Personal history and family history has been taken and reviewed.  Vital signs are stable. 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 Out at [employer].      Office Visit on 04/17/2023   Component Date Value Ref Range Status    Hemoglobin A1C, POC 04/17/2023 6.1  % Final   Lab Visit on 12/17/2022   Component Date Value Ref Range Status    Fecal Immunochemical Test (iFOBT) 12/17/2022 Negative  Negative Final   Lab Visit on 12/17/2022   Component Date Value Ref Range Status    Microalbumin, Urine 12/17/2022 5.0  ug/mL Final    Creatinine, Urine 12/17/2022 87.0  15.0 - 325.0 mg/dL Final    Microalb/Creat Ratio 12/17/2022 5.7  0.0 - 30.0 ug/mg Final   Lab Visit on 12/17/2022   Component Date Value Ref Range Status    Hemoglobin A1C 12/17/2022 5.9 (H)  4.0 - 5.6 % Final    Estimated Avg Glucose 12/17/2022 123  68 - 131 mg/dL Final    Cholesterol 12/17/2022 185  120 - 199 mg/dL Final    Triglycerides 12/17/2022 78  30 - 150 mg/dL Final    HDL 12/17/2022 52  40 - 75 mg/dL Final    LDL Cholesterol 12/17/2022 117.4  63.0 - 159.0 mg/dL Final    HDL/Cholesterol Ratio 12/17/2022 28.1  20.0 - 50.0 % Final    Total Cholesterol/HDL Ratio 12/17/2022 3.6  2.0 - 5.0 Final    Non-HDL Cholesterol 12/17/2022 133  mg/dL Final    Sodium 12/17/2022 140  136 - 145 mmol/L Final    Potassium 12/17/2022 5.0  3.5 - 5.1 mmol/L Final    Chloride 12/17/2022 105  95 - 110 mmol/L Final    CO2 12/17/2022 25  23 - 29 mmol/L Final    Glucose 12/17/2022 109  70 - 110 mg/dL Final    BUN  12/17/2022 15  6 - 20 mg/dL Final    Creatinine 12/17/2022 0.8  0.5 - 1.4 mg/dL Final    Calcium 12/17/2022 10.0  8.7 - 10.5 mg/dL Final    Total Protein 12/17/2022 6.4  6.0 - 8.4 g/dL Final    Albumin 12/17/2022 4.0  3.5 - 5.2 g/dL Final    Total Bilirubin 12/17/2022 0.5  0.1 - 1.0 mg/dL Final    Alkaline Phosphatase 12/17/2022 95  55 - 135 U/L Final    AST 12/17/2022 13  10 - 40 U/L Final    ALT 12/17/2022 15  10 - 44 U/L Final    Anion Gap 12/17/2022 10  8 - 16 mmol/L Final    eGFR 12/17/2022 >60.0  >60 mL/min/1.73 m^2 Final   Lab Visit on 11/17/2022   Component Date Value Ref Range Status    Sodium 11/17/2022 140  136 - 145 mmol/L Final    Potassium 11/17/2022 4.4  3.5 - 5.1 mmol/L Final    Chloride 11/17/2022 108  95 - 110 mmol/L Final    CO2 11/17/2022 24  23 - 29 mmol/L Final    Glucose 11/17/2022 112 (H)  70 - 110 mg/dL Final    BUN 11/17/2022 11  6 - 20 mg/dL Final    Creatinine 11/17/2022 0.8  0.5 - 1.4 mg/dL Final    Calcium 11/17/2022 9.8  8.7 - 10.5 mg/dL Final    Total Protein 11/17/2022 6.1  6.0 - 8.4 g/dL Final    Albumin 11/17/2022 3.8  3.5 - 5.2 g/dL Final    Total Bilirubin 11/17/2022 0.4  0.1 - 1.0 mg/dL Final    Alkaline Phosphatase 11/17/2022 88  55 - 135 U/L Final    AST 11/17/2022 11  10 - 40 U/L Final    ALT 11/17/2022 13  10 - 44 U/L Final    Anion Gap 11/17/2022 8  8 - 16 mmol/L Final    eGFR 11/17/2022 >60.0  >60 mL/min/1.73 m^2 Final    Hemoglobin A1C 11/17/2022 6.4 (H)  4.0 - 5.6 % Final    Estimated Avg Glucose 11/17/2022 137 (H)  68 - 131 mg/dL Final       Past Medical History:   Diagnosis Date    Diabetes mellitus, type 2     Kidney stone     PONV (postoperative nausea and vomiting)      Past Surgical History:   Procedure Laterality Date    ANTEGRADE NEPHROSTOGRAPHY Right 12/11/2020    Procedure: NEPHROSTOGRAM, ANTEGRADE;  Surgeon: Yuko Hawkins MD;  [facility]: [facility];  Service: Urology;  Laterality: Right;    CYSTOSCOPY W/ URETERAL STENT PLACEMENT Right 11/24/2020     Procedure: CYSTOSCOPY, WITH URETERAL STENT INSERTION;  Surgeon: Yuko Hawkins MD;  Location: [facility] OR;  Service: Urology;  Laterality: Right;    CYSTOSCOPY W/ URETERAL STENT REMOVAL Right 12/23/2020    Procedure: CYSTOSCOPY, WITH URETERAL STENT REMOVAL;  Surgeon: Yuko Hawkins MD;  Location: [facility] OR;  Service: Urology;  Laterality: Right;    HYSTERECTOMY      PERCUTANEOUS NEPHROLITHOTRIPSY Right 12/11/2020    Procedure: NEPHROLITHOTRIPSY, PERCUTANEOUS;  Surgeon: Yuko Hawkins MD;  Location: [facility] OR;  Service: Urology;  Laterality: Right;    PERCUTANEOUS NEPHROSTOMY Right 12/10/2020    Procedure: Creation, Nephrostomy, Percutaneous;  Surgeon: [facility];  Location: [facility] OR;  Service: General;  Laterality: Right;     Family History   Problem Relation Age of Onset    Heart failure Father     Hypertension Sister     Diabetes type II Sister     Heart failure Sister     Diabetes type II Brother     Diabetes type II Brother     COPD Maternal Grandmother        Marital Status: Single  Alcohol History:  reports no history of alcohol use.  Tobacco History:  reports that she has been smoking cigarettes. She has been smoking an average of .5 packs per day. She has been exposed to tobacco smoke. She has never used smokeless tobacco.  Drug History:  reports no history of drug use.    Review of patient's allergies indicates:   Allergen Reactions    Metformin     Penicillins      I was young unsure of reaction         Current Outpatient Medications:     blood sugar diagnostic Strp, To check BG 2 times daily, to use with insurance preferred meter, Disp: 200 each, Rfl: 1    busPIRone (BUSPAR) 5 MG Tab, TAKE 1 TABLET(5 MG) BY MOUTH THREE TIMES DAILY AS NEEDED FOR ANXIETY, Disp: 270 tablet, Rfl: 0    EScitalopram oxalate (LEXAPRO) 20 MG tablet, Take 1 tablet (20 mg total) by mouth once daily., Disp: 90 tablet, Rfl: 1    hydroCHLOROthiazide (HYDRODIURIL) 25 MG tablet, Take 1 tablet (25 mg total) by mouth  "once daily., Disp: 30 tablet, Rfl: 1    lancets Misc, To check BG 2 times daily, to use with insurance preferred meter, Disp: 200 each, Rfl: 1    pravastatin (PRAVACHOL) 10 MG tablet, Take 1 tablet (10 mg total) by mouth once daily., Disp: 90 tablet, Rfl: 3    traZODone (DESYREL) 50 MG tablet, Take 1 tablet (50 mg total) by mouth nightly as needed for Insomnia., Disp: 30 tablet, Rfl: 11    TRUE METRIX AIR GLUCOSE METER [facility], USE AS DIRECTED TO CHECK BLOOD GLUCOSE TWICE DAILY, Disp: , Rfl:     glimepiride (AMARYL) 1 MG tablet, Take 1 tablet (1 mg total) by mouth daily with breakfast., Disp: 90 tablet, Rfl: 1    Review of Systems   Constitutional:  Negative for activity change, appetite change and fever.   HENT:  Negative for postnasal drip, rhinorrhea and sinus pressure.    Eyes:  Negative for visual disturbance.   Respiratory:  Negative for cough and shortness of breath.    Cardiovascular:  Negative for chest pain.   Gastrointestinal:  Negative for abdominal distention and abdominal pain.   Genitourinary:  Negative for difficulty urinating and dysuria.   Musculoskeletal:  Negative for arthralgias and myalgias.   Neurological:  Negative for headaches.   Hematological:  Negative for adenopathy.   Psychiatric/Behavioral:  The patient is nervous/anxious.         Objective:      Vitals:    04/17/23 1131   BP: 136/86   Pulse: 100   Weight: 89.8 kg (198 lb)   Height: 5' 9" (1.753 m)     Physical Exam  Constitutional:       Appearance: Normal appearance.   HENT:      Head: Normocephalic and atraumatic.   Eyes:      Conjunctiva/sclera: Conjunctivae normal.   Cardiovascular:      Rate and Rhythm: Normal rate and regular rhythm.   Pulmonary:      Effort: Pulmonary effort is normal. No respiratory distress.      Breath sounds: Normal breath sounds. No wheezing.   Abdominal:      General: There is no distension.      Palpations: There is no mass.      Tenderness: There is no abdominal tenderness.   Musculoskeletal:      Right " lower leg: No edema.      Left lower leg: No edema.   Skin:     Findings: No erythema.   Neurological:      Mental Status: She is alert and oriented to person, place, and time.   Psychiatric:         Behavior: Behavior normal.         Assessment:       1. Type 2 diabetes mellitus with hyperglycemia, without long-term current use of insulin    2. Nephrolithiasis    3. Hyperparathyroidism    4. Anxiety and depression    5. Encounter for long-term (current) use of other medications         Plan:       Type 2 diabetes mellitus with hyperglycemia, without long-term current use of insulin  Comments:  A1c today is 6.1% and well controlled. continue as is.  Orders:  -     Hemoglobin A1C, POCT  -     glimepiride (AMARYL) 1 MG tablet; Take 1 tablet (1 mg total) by mouth daily with breakfast.  Dispense: 90 tablet; Refill: 1    Nephrolithiasis  Comments:  recurrent calcium stones. Has met with endo re: PTH high. seen urology and required stents    Hyperparathyroidism  Comments:  needs to f/u with Dr. Ahumada. encouraged to schedule appt for followup  Orders:  -     PTH, intact; Future; Expected date: 04/17/2023    Anxiety and depression  Comments:  very effective at the current dose. maintain as is.    Encounter for long-term (current) use of other medications  -     CBC Auto Differential; Future; Expected date: 04/17/2023  -     Comprehensive Metabolic Panel; Future; Expected date: 04/17/2023  -     Lipid Panel; Future; Expected date: 04/17/2023  -     TSH w/reflex to FT4; Future; Expected date: 04/17/2023  -     Urinalysis, Reflex to Urine Culture Urine, Clean Catch      Follow up in about 4 months (around 8/17/2023).        4/17/2023 Jeyson Portillo PA-C

## 2023-04-18 LAB
ALBUMIN SERPL-MCNC: 4.2 G/DL (ref 3.6–5.1)
ALBUMIN/GLOB SERPL: 2 (CALC) (ref 1–2.5)
ALP SERPL-CCNC: 95 U/L (ref 37–153)
ALT SERPL-CCNC: 16 U/L (ref 6–29)
APPEARANCE UR: CLEAR
AST SERPL-CCNC: 11 U/L (ref 10–35)
BACTERIA #/AREA URNS HPF: NORMAL /HPF
BACTERIA UR CULT: NORMAL
BASOPHILS # BLD AUTO: 42 CELLS/UL (ref 0–200)
BASOPHILS NFR BLD AUTO: 0.5 %
BILIRUB SERPL-MCNC: 0.5 MG/DL (ref 0.2–1.2)
BILIRUB UR QL STRIP: NEGATIVE
BUN SERPL-MCNC: 12 MG/DL (ref 7–25)
BUN/CREAT SERPL: NORMAL (CALC) (ref 6–22)
CALCIUM SERPL-MCNC: 9.8 MG/DL (ref 8.6–10.4)
CHLORIDE SERPL-SCNC: 107 MMOL/L (ref 98–110)
CHOLEST SERPL-MCNC: 169 MG/DL
CHOLEST/HDLC SERPL: 2.8 (CALC)
CO2 SERPL-SCNC: 27 MMOL/L (ref 20–32)
COLOR UR: YELLOW
CREAT SERPL-MCNC: 0.67 MG/DL (ref 0.5–1.03)
EGFR: 103 ML/MIN/1.73M2
EOSINOPHIL # BLD AUTO: 109 CELLS/UL (ref 15–500)
EOSINOPHIL NFR BLD AUTO: 1.3 %
ERYTHROCYTE [DISTWIDTH] IN BLOOD BY AUTOMATED COUNT: 12.4 % (ref 11–15)
GLOBULIN SER CALC-MCNC: 2.1 G/DL (CALC) (ref 1.9–3.7)
GLUCOSE SERPL-MCNC: 91 MG/DL (ref 65–99)
GLUCOSE UR QL STRIP: NEGATIVE
HCT VFR BLD AUTO: 39.4 % (ref 35–45)
HDLC SERPL-MCNC: 61 MG/DL
HGB BLD-MCNC: 14.2 G/DL (ref 11.7–15.5)
HGB UR QL STRIP: NEGATIVE
HYALINE CASTS #/AREA URNS LPF: NORMAL /LPF
KETONES UR QL STRIP: NEGATIVE
LDLC SERPL CALC-MCNC: 89 MG/DL (CALC)
LEUKOCYTE ESTERASE UR QL STRIP: NEGATIVE
LYMPHOCYTES # BLD AUTO: 3553 CELLS/UL (ref 850–3900)
LYMPHOCYTES NFR BLD AUTO: 42.3 %
MCH RBC QN AUTO: 36.4 PG (ref 27–33)
MCHC RBC AUTO-ENTMCNC: 36 G/DL (ref 32–36)
MCV RBC AUTO: 101 FL (ref 80–100)
MONOCYTES # BLD AUTO: 756 CELLS/UL (ref 200–950)
MONOCYTES NFR BLD AUTO: 9 %
NEUTROPHILS # BLD AUTO: 3940 CELLS/UL (ref 1500–7800)
NEUTROPHILS NFR BLD AUTO: 46.9 %
NITRITE UR QL STRIP: NEGATIVE
NONHDLC SERPL-MCNC: 108 MG/DL (CALC)
PH UR STRIP: 6.5 [PH] (ref 5–8)
PLATELET # BLD AUTO: 237 THOUSAND/UL (ref 140–400)
PMV BLD REES-ECKER: 10.1 FL (ref 7.5–12.5)
POTASSIUM SERPL-SCNC: 4.3 MMOL/L (ref 3.5–5.3)
PROT SERPL-MCNC: 6.3 G/DL (ref 6.1–8.1)
PROT UR QL STRIP: NEGATIVE
PTH-INTACT SERPL-MCNC: 80 PG/ML (ref 16–77)
RBC # BLD AUTO: 3.9 MILLION/UL (ref 3.8–5.1)
RBC #/AREA URNS HPF: NORMAL /HPF
SERVICE CMNT-IMP: NORMAL
SODIUM SERPL-SCNC: 141 MMOL/L (ref 135–146)
SP GR UR STRIP: 1.01 (ref 1–1.03)
SQUAMOUS #/AREA URNS HPF: NORMAL /HPF
TRIGL SERPL-MCNC: 98 MG/DL
TSH SERPL-ACNC: 2.86 MIU/L
WBC # BLD AUTO: 8.4 THOUSAND/UL (ref 3.8–10.8)
WBC #/AREA URNS HPF: NORMAL /HPF

## 2023-04-21 ENCOUNTER — PATIENT MESSAGE (OUTPATIENT)
Dept: FAMILY MEDICINE | Facility: CLINIC | Age: 53
End: 2023-04-21

## 2023-05-17 ENCOUNTER — TELEPHONE (OUTPATIENT)
Dept: FAMILY MEDICINE | Facility: CLINIC | Age: 53
End: 2023-05-17

## 2023-05-17 DIAGNOSIS — M54.50 LOW BACK PAIN, UNSPECIFIED BACK PAIN LATERALITY, UNSPECIFIED CHRONICITY, UNSPECIFIED WHETHER SCIATICA PRESENT: Primary | ICD-10-CM

## 2023-05-17 NOTE — TELEPHONE ENCOUNTER
" From:Tereza Hawkins"       Sent:5/17/2023 10:07 AM CDT         To:Patient Appointment Schedule Request Message List    Subject:Appointment Request    Yes since November of last year, once a week, but now Im only going every other week for the last couple months. And OK thank you      ----- Message -----       From:Nurse Woodward       Sent:5/17/2023 10:02 AM CDT         To:Tereza Hawkins"    Subject:Appointment Request    Hi! Luis Manuel is out of the office today. I will hold this message for him. Have you tried any physical therapy or had previous work up for your back pain?      ----- Message -----       From:Tereza Hawkins"       Sent:5/17/2023  9:50 AM CDT         To:Patient Appointment Schedule Request Message List    Subject:Appointment Request    Good morning thank you for your quick response. Since October of last year yes I take ibuprofen lidocaine patches ice pack, so I just want to see maybe whats going on thank you      ----- Message -----       From:Nurse Woodward       Sent:5/17/2023  9:41 AM CDT         To:Tereza Hawkins"    Subject:Appointment Request    Good morning. How long has the pain been going on? Have you taken anything over the counter?  Any falls or trauma?      ------ Message -----       From:Tereza Hawkins"       Sent:5/17/2023  9:27 AM CDT         To:Jeyson Portillo PA-C    Subject:Appointment Request      Reason for visit: Can I get a Imaging of my lower back please its been bothering me.    Comments:  My lower back hurts sometimes worse than others and I think its affecting my walking by not picking up my feet . Thanks   "

## 2023-05-18 ENCOUNTER — PATIENT MESSAGE (OUTPATIENT)
Dept: FAMILY MEDICINE | Facility: CLINIC | Age: 53
End: 2023-05-18

## 2023-05-19 ENCOUNTER — TELEPHONE (OUTPATIENT)
Dept: FAMILY MEDICINE | Facility: CLINIC | Age: 53
End: 2023-05-19
Payer: COMMERCIAL

## 2023-05-22 ENCOUNTER — TELEPHONE (OUTPATIENT)
Dept: FAMILY MEDICINE | Facility: CLINIC | Age: 53
End: 2023-05-22
Payer: COMMERCIAL

## 2023-05-22 NOTE — TELEPHONE ENCOUNTER
Called patient regarding back and spine referral and let patient know insurance is out of network. [place]ATT

## 2023-08-16 ENCOUNTER — TELEPHONE (OUTPATIENT)
Dept: FAMILY MEDICINE | Facility: CLINIC | Age: 53
End: 2023-08-16

## 2023-08-16 NOTE — TELEPHONE ENCOUNTER
----- Message from Zoila Banks sent at 8/16/2023 12:35 PM CDT -----  Patient called and stated that she received a call to confirm her appointment for tomorrow and she has jury duty tomorrow morning and she would like to see if she can come back in the afternoon ? please give her a call at [phone]

## 2023-08-16 NOTE — TELEPHONE ENCOUNTER
Spoke with pt. She assured me she would be out of jury duty for an afternoon appt. Pt appt has been r/s

## 2023-08-17 ENCOUNTER — OFFICE VISIT (OUTPATIENT)
Dept: FAMILY MEDICINE | Facility: CLINIC | Age: 53
End: 2023-08-17
Payer: COMMERCIAL

## 2023-08-17 VITALS
SYSTOLIC BLOOD PRESSURE: 128 MMHG | HEIGHT: 69 IN | BODY MASS INDEX: 29.62 KG/M2 | HEART RATE: 84 BPM | OXYGEN SATURATION: 95 % | DIASTOLIC BLOOD PRESSURE: 80 MMHG | WEIGHT: 200 LBS

## 2023-08-17 DIAGNOSIS — E11.65 TYPE 2 DIABETES MELLITUS WITH HYPERGLYCEMIA, WITHOUT LONG-TERM CURRENT USE OF INSULIN: Primary | ICD-10-CM

## 2023-08-17 DIAGNOSIS — J30.1 SEASONAL ALLERGIC RHINITIS DUE TO POLLEN: ICD-10-CM

## 2023-08-17 DIAGNOSIS — E11.65 TYPE 2 DIABETES MELLITUS WITH HYPERGLYCEMIA, WITHOUT LONG-TERM CURRENT USE OF INSULIN: ICD-10-CM

## 2023-08-17 DIAGNOSIS — E21.0 PRIMARY HYPERPARATHYROIDISM: ICD-10-CM

## 2023-08-17 DIAGNOSIS — F32.A ANXIETY AND DEPRESSION: ICD-10-CM

## 2023-08-17 DIAGNOSIS — F41.9 ANXIETY AND DEPRESSION: ICD-10-CM

## 2023-08-17 LAB — HBA1C MFR BLD: 6.8 %

## 2023-08-17 PROCEDURE — 83036 HEMOGLOBIN GLYCOSYLATED A1C: CPT | Mod: QW,,, | Performed by: PHYSICIAN ASSISTANT

## 2023-08-17 PROCEDURE — 99214 PR OFFICE/OUTPT VISIT, EST, LEVL IV, 30-39 MIN: ICD-10-PCS | Mod: S$GLB,,, | Performed by: PHYSICIAN ASSISTANT

## 2023-08-17 PROCEDURE — 83036 POCT HEMOGLOBIN A1C: ICD-10-PCS | Mod: QW,,, | Performed by: PHYSICIAN ASSISTANT

## 2023-08-17 PROCEDURE — 99214 OFFICE O/P EST MOD 30 MIN: CPT | Mod: S$GLB,,, | Performed by: PHYSICIAN ASSISTANT

## 2023-08-17 RX ORDER — ESCITALOPRAM OXALATE 20 MG/1
20 TABLET ORAL DAILY
Qty: 90 TABLET | Refills: 1 | Status: SHIPPED | OUTPATIENT
Start: 2023-08-17

## 2023-08-17 RX ORDER — LEVOCETIRIZINE DIHYDROCHLORIDE 5 MG/1
5 TABLET, FILM COATED ORAL NIGHTLY
Qty: 30 TABLET | Refills: 11 | Status: SHIPPED | OUTPATIENT
Start: 2023-08-17 | End: 2024-01-23

## 2023-08-17 RX ORDER — FLUTICASONE PROPIONATE 50 MCG
2 SPRAY, SUSPENSION (ML) NASAL DAILY
Qty: 18.2 ML | Refills: 1 | Status: SHIPPED | OUTPATIENT
Start: 2023-08-17 | End: 2023-11-15

## 2023-08-17 RX ORDER — PRAVASTATIN SODIUM 10 MG/1
10 TABLET ORAL DAILY
Qty: 90 TABLET | Refills: 3 | Status: SHIPPED | OUTPATIENT
Start: 2023-08-17 | End: 2024-01-23 | Stop reason: SDUPTHER

## 2023-08-17 RX ORDER — GLIMEPIRIDE 1 MG/1
1 TABLET ORAL
Qty: 90 TABLET | Refills: 1 | Status: CANCELLED | OUTPATIENT
Start: 2023-08-17 | End: 2024-02-13

## 2023-08-17 RX ORDER — HYDROCHLOROTHIAZIDE 25 MG/1
25 TABLET ORAL DAILY
Qty: 30 TABLET | Refills: 1 | Status: SHIPPED | OUTPATIENT
Start: 2023-08-17 | End: 2024-01-23

## 2023-08-17 NOTE — PROGRESS NOTES
"  SUBJECTIVE:    Patient ID: Tereza Weinstein is a 53 y.o. female.    Chief Complaint: 4M DM Check Up / A1C, foot exam today, and mammogram order    This is a 52 yo female who presents today for checkup and refills. Reports that she has been doing pretty well overall. Still staying busy over at [facility]. A1c today slightly higher at 6.8%. BP is well managed. Eye exam going to be completed. Mammogram will be done in Oct. She admits to stopping the glimeperide due to feeling "sick" everytime that she took it.      Office Visit on 08/17/2023   Component Date Value Ref Range Status    Hemoglobin A1C, POC 08/17/2023 6.8  % Final   Office Visit on 04/17/2023   Component Date Value Ref Range Status    Hemoglobin A1C, POC 04/17/2023 6.1  % Final    WBC 04/17/2023 8.4  3.8 - 10.8 Thousand/uL Final    RBC 04/17/2023 3.90  3.80 - 5.10 Million/uL Final    Hemoglobin 04/17/2023 14.2  11.7 - 15.5 g/dL Final    Hematocrit 04/17/2023 39.4  35.0 - 45.0 % Final    MCV 04/17/2023 101.0 (H)  80.0 - 100.0 fL Final    MCH 04/17/2023 36.4 (H)  27.0 - 33.0 pg Final    MCHC 04/17/2023 36.0  32.0 - 36.0 g/dL Final    RDW 04/17/2023 12.4  11.0 - 15.0 % Final    Platelets 04/17/2023 237  140 - 400 Thousand/uL Final    MPV 04/17/2023 10.1  7.5 - 12.5 fL Final    Neutrophils, Abs 04/17/2023 3,940  1,500 - 7,800 cells/uL Final    Lymph # 04/17/2023 3,553  850 - 3,900 cells/uL Final    Mono # 04/17/2023 756  200 - 950 cells/uL Final    Eos # 04/17/2023 109  15 - 500 cells/uL Final    Baso # 04/17/2023 42  0 - 200 cells/uL Final    Neutrophils Relative 04/17/2023 46.9  % Final    Lymph % 04/17/2023 42.3  % Final    Mono % 04/17/2023 9.0  % Final    Eosinophil % 04/17/2023 1.3  % Final    Basophil % 04/17/2023 0.5  % Final    Glucose 04/17/2023 91  65 - 99 mg/dL Final    BUN 04/17/2023 12  7 - 25 mg/dL Final    Creatinine 04/17/2023 0.67  0.50 - 1.03 mg/dL Final    eGFR 04/17/2023 103  > OR = 60 mL/min/1.73m2 Final    " BUN/Creatinine Ratio 04/17/2023 NOT APPLICABLE  6 - 22 (calc) Final    Sodium 04/17/2023 141  135 - 146 mmol/L Final    Potassium 04/17/2023 4.3  3.5 - 5.3 mmol/L Final    Chloride 04/17/2023 107  98 - 110 mmol/L Final    CO2 04/17/2023 27  20 - 32 mmol/L Final    Calcium 04/17/2023 9.8  8.6 - 10.4 mg/dL Final    Total Protein 04/17/2023 6.3  6.1 - 8.1 g/dL Final    Albumin 04/17/2023 4.2  3.6 - 5.1 g/dL Final    Globulin, Total 04/17/2023 2.1  1.9 - 3.7 g/dL (calc) Final    Albumin/Globulin Ratio 04/17/2023 2.0  1.0 - 2.5 (calc) Final    Total Bilirubin 04/17/2023 0.5  0.2 - 1.2 mg/dL Final    Alkaline Phosphatase 04/17/2023 95  37 - 153 U/L Final    AST 04/17/2023 11  10 - 35 U/L Final    ALT 04/17/2023 16  6 - 29 U/L Final    Cholesterol 04/17/2023 169  <200 mg/dL Final    HDL 04/17/2023 61  > OR = 50 mg/dL Final    Triglycerides 04/17/2023 98  <150 mg/dL Final    LDL Cholesterol 04/17/2023 89  mg/dL (calc) Final    HDL/Cholesterol Ratio 04/17/2023 2.8  <5.0 (calc) Final    Non HDL Chol. (LDL+VLDL) 04/17/2023 108  <130 mg/dL (calc) Final    TSH w/reflex to FT4 04/17/2023 2.86  mIU/L Final    PTH, Intact 04/17/2023 80 (H)  16 - 77 pg/mL Final    Color, UA 04/17/2023 YELLOW  YELLOW Final    Appearance, UA 04/17/2023 CLEAR  CLEAR Final    Specific Gravity, UA 04/17/2023 1.015  1.001 - 1.035 Final    pH, UA 04/17/2023 6.5  5.0 - 8.0 Final    Glucose, UA 04/17/2023 NEGATIVE  NEGATIVE Final    Bilirubin, UA 04/17/2023 NEGATIVE  NEGATIVE Final    Ketones, UA 04/17/2023 NEGATIVE  NEGATIVE Final    Occult Blood UA 04/17/2023 NEGATIVE  NEGATIVE Final    Protein, UA 04/17/2023 NEGATIVE  NEGATIVE Final    Nitrite, UA 04/17/2023 NEGATIVE  NEGATIVE Final    Leukocytes, UA 04/17/2023 NEGATIVE  NEGATIVE Final    WBC Casts, UA 04/17/2023 NONE SEEN  < OR = 5 /HPF Final    RBC Casts, UA 04/17/2023 NONE SEEN  < OR = 2 /HPF Final    Squam Epithel, UA 04/17/2023 NONE SEEN  < OR = 5 /HPF Final     Bacteria, UA 04/17/2023 NONE SEEN  NONE SEEN /HPF Final    Hyaline Casts, UA 04/17/2023 NONE SEEN  NONE SEEN /LPF Final    Service Cmt: 04/17/2023    Final    Reflexive Urine Culture 04/17/2023    Final       Past Medical History:   Diagnosis Date    Diabetes mellitus, type 2     Kidney stone     PONV (postoperative nausea and vomiting)      Past Surgical History:   Procedure Laterality Date    ANTEGRADE NEPHROSTOGRAPHY Right 12/11/2020    Procedure: NEPHROSTOGRAM, ANTEGRADE;  Surgeon: Yuko Hawkins MD;  Location: [facility];  Service: Urology;  Laterality: Right;    CYSTOSCOPY W/ URETERAL STENT PLACEMENT Right 11/24/2020    Procedure: CYSTOSCOPY, WITH URETERAL STENT INSERTION;  Surgeon: Yuko Hawkins MD;  Location: [facility] OR;  Service: Urology;  Laterality: Right;    CYSTOSCOPY W/ URETERAL STENT REMOVAL Right 12/23/2020    Procedure: CYSTOSCOPY, WITH URETERAL STENT REMOVAL;  Surgeon: Yuko Hawkins MD;  Location: [facility] OR;  Service: Urology;  Laterality: Right;    HYSTERECTOMY      PERCUTANEOUS NEPHROLITHOTRIPSY Right 12/11/2020    Procedure: NEPHROLITHOTRIPSY, PERCUTANEOUS;  Surgeon: Yuko Hawkins MD;  Location: [facility] OR;  Service: Urology;  Laterality: Right;    PERCUTANEOUS NEPHROSTOMY Right 12/10/2020    Procedure: Creation, Nephrostomy, Percutaneous;  Surgeon: Raulito Diagnostic Provider;  Location: [facility];  Service: General;  Laterality: Right;     Family History   Problem Relation Age of Onset    Heart failure Father     Hypertension Sister     Diabetes type II Sister     Heart failure Sister     Diabetes type II Brother     Diabetes type II Brother     COPD Maternal Grandmother        Marital Status: Single  Alcohol History:  reports no history of alcohol use.  Tobacco History:  reports that she has been smoking cigarettes. She has been exposed to tobacco smoke. She has never used smokeless tobacco.  Drug History:  reports no history of drug use.    Review of patient's  allergies indicates:   Allergen Reactions    Metformin     Penicillins      I was young unsure of reaction         Current Outpatient Medications:     blood sugar diagnostic Strp, To check BG 2 times daily, to use with insurance preferred meter, Disp: 200 each, Rfl: 1    busPIRone (BUSPAR) 5 MG Tab, TAKE 1 TABLET(5 MG) BY MOUTH THREE TIMES DAILY AS NEEDED FOR ANXIETY, Disp: 270 tablet, Rfl: 0    lancets Misc, To check BG 2 times daily, to use with insurance preferred meter, Disp: 200 each, Rfl: 1    traZODone (DESYREL) 50 MG tablet, Take 1 tablet (50 mg total) by mouth nightly as needed for Insomnia., Disp: 30 tablet, Rfl: 11    TRUE METRIX AIR GLUCOSE METER Misc, USE AS DIRECTED TO CHECK BLOOD GLUCOSE TWICE DAILY, Disp: , Rfl:     EScitalopram oxalate (LEXAPRO) 20 MG tablet, Take 1 tablet (20 mg total) by mouth once daily., Disp: 90 tablet, Rfl: 1    fluticasone propionate (FLONASE) 50 mcg/actuation nasal spray, 2 sprays (100 mcg total) by Each Nostril route once daily., Disp: 18.2 mL, Rfl: 1    hydroCHLOROthiazide (HYDRODIURIL) 25 MG tablet, Take 1 tablet (25 mg total) by mouth once daily., Disp: 30 tablet, Rfl: 1    levocetirizine (XYZAL) 5 MG tablet, Take 1 tablet (5 mg total) by mouth every evening., Disp: 30 tablet, Rfl: 11    pravastatin (PRAVACHOL) 10 MG tablet, Take 1 tablet (10 mg total) by mouth once daily., Disp: 90 tablet, Rfl: 3    Review of Systems   Constitutional:  Negative for activity change, appetite change and fever.   HENT:  Negative for postnasal drip, rhinorrhea and sinus pressure.    Eyes:  Negative for visual disturbance.   Respiratory:  Negative for cough and shortness of breath.    Cardiovascular:  Negative for chest pain.   Gastrointestinal:  Negative for abdominal distention and abdominal pain.   Genitourinary:  Negative for difficulty urinating and dysuria.   Musculoskeletal:  Negative for arthralgias and myalgias.   Neurological:  Negative for headaches.   Hematological:   "Negative for adenopathy.   Psychiatric/Behavioral:  The patient is nervous/anxious.           Objective:      Vitals:    08/17/23 1501   BP: 128/80   Pulse: 84   SpO2: 95%   Weight: 90.7 kg (200 lb)   Height: 5' 9" (1.753 m)     Physical Exam  Constitutional:       Appearance: Normal appearance.   HENT:      Head: Normocephalic and atraumatic.   Eyes:      Conjunctiva/sclera: Conjunctivae normal.   Cardiovascular:      Rate and Rhythm: Normal rate and regular rhythm.   Pulmonary:      Effort: Pulmonary effort is normal. No respiratory distress.      Breath sounds: Normal breath sounds. No wheezing.   Abdominal:      General: There is no distension.      Palpations: There is no mass.      Tenderness: There is no abdominal tenderness.   Musculoskeletal:      Right lower leg: No edema.      Left lower leg: No edema.   Skin:     Findings: No erythema.   Neurological:      Mental Status: She is alert and oriented to person, place, and time.   Psychiatric:         Behavior: Behavior normal.         Assessment:       1. Type 2 diabetes mellitus with hyperglycemia, without long-term current use of insulin    2. Anxiety and depression    3. Type 2 diabetes mellitus with hyperglycemia, without long-term current use of insulin    4. Primary hyperparathyroidism    5. Seasonal allergic rhinitis due to pollen         Plan:       Type 2 diabetes mellitus with hyperglycemia, without long-term current use of insulin  Comments:  A1c up to 6.8% and off all meds. diet alone. will monitor again in 4-6 mos and if remains high will consider intensifying therapy.  Orders:  -     Foot Exam Performed  -     Hemoglobin A1C, POCT  -     pravastatin (PRAVACHOL) 10 MG tablet; Take 1 tablet (10 mg total) by mouth once daily.  Dispense: 90 tablet; Refill: 3    Anxiety and depression  Comments:  mood and anxiety is stable.  Orders:  -     EScitalopram oxalate (LEXAPRO) 20 MG tablet; Take 1 tablet (20 mg total) by mouth once daily.  Dispense: 90 " tablet; Refill: 1    Type 2 diabetes mellitus with hyperglycemia, without long-term current use of insulin  -     Foot Exam Performed  -     Hemoglobin A1C, POCT  -     pravastatin (PRAVACHOL) 10 MG tablet; Take 1 tablet (10 mg total) by mouth once daily.  Dispense: 90 tablet; Refill: 3    Primary hyperparathyroidism  Comments:  recent PTH looking better. Ca was also better.  Orders:  -     hydroCHLOROthiazide (HYDRODIURIL) 25 MG tablet; Take 1 tablet (25 mg total) by mouth once daily.  Dispense: 30 tablet; Refill: 1    Seasonal allergic rhinitis due to pollen  -     fluticasone propionate (FLONASE) 50 mcg/actuation nasal spray; 2 sprays (100 mcg total) by Each Nostril route once daily.  Dispense: 18.2 mL; Refill: 1  -     levocetirizine (XYZAL) 5 MG tablet; Take 1 tablet (5 mg total) by mouth every evening.  Dispense: 30 tablet; Refill: 11      Follow up in about 6 months (around 2/17/2024) for Diabetic Check-Up.        8/17/2023 Jeyson Portillo PA-C

## 2023-11-24 ENCOUNTER — HOSPITAL ENCOUNTER (OUTPATIENT)
Dept: RADIOLOGY | Facility: CLINIC | Age: 53
Discharge: HOME | End: 2023-11-24
Payer: COMMERCIAL

## 2023-11-24 DIAGNOSIS — Z12.31 ENCOUNTER FOR SCREENING MAMMOGRAM FOR BREAST CANCER: ICD-10-CM

## 2023-11-24 PROCEDURE — 77063 BREAST TOMOSYNTHESIS BI: CPT | Mod: 26,,, | Performed by: RADIOLOGY

## 2023-11-24 PROCEDURE — 77067 SCR MAMMO BI INCL CAD: CPT | Mod: TC,PO

## 2023-11-24 PROCEDURE — 77067 SCR MAMMO BI INCL CAD: CPT | Mod: 26,,, | Performed by: RADIOLOGY

## 2023-11-24 PROCEDURE — 77067 MAMMO DIGITAL SCREENING BILAT WITH TOMO: ICD-10-PCS | Mod: 26,,, | Performed by: RADIOLOGY

## 2023-11-24 PROCEDURE — 77063 MAMMO DIGITAL SCREENING BILAT WITH TOMO: ICD-10-PCS | Mod: 26,,, | Performed by: RADIOLOGY

## 2024-01-09 ENCOUNTER — TELEPHONE (OUTPATIENT)
Dept: FAMILY MEDICINE | Facility: CLINIC | Age: 54
End: 2024-01-09
Payer: COMMERCIAL

## 2024-01-22 ENCOUNTER — TELEPHONE (OUTPATIENT)
Dept: FAMILY MEDICINE | Facility: CLINIC | Age: 54
End: 2024-01-22
Payer: COMMERCIAL

## 2024-01-22 NOTE — TELEPHONE ENCOUNTER
----- Message from Danielle Torres sent at 1/22/2024  7:53 AM CST -----  - 1/19-2:19-pt is calling to reschedule her appt from Tuesday   [phone]

## 2024-01-23 ENCOUNTER — OFFICE VISIT (OUTPATIENT)
Dept: FAMILY MEDICINE | Facility: CLINIC | Age: 54
End: 2024-01-23
Payer: COMMERCIAL

## 2024-01-23 VITALS
HEIGHT: 69 IN | BODY MASS INDEX: 30.96 KG/M2 | DIASTOLIC BLOOD PRESSURE: 80 MMHG | WEIGHT: 209 LBS | HEART RATE: 83 BPM | SYSTOLIC BLOOD PRESSURE: 132 MMHG | OXYGEN SATURATION: 99 %

## 2024-01-23 DIAGNOSIS — Z12.11 SCREENING FOR COLON CANCER: ICD-10-CM

## 2024-01-23 DIAGNOSIS — Z80.0 FAMILY HISTORY OF COLON CANCER: ICD-10-CM

## 2024-01-23 DIAGNOSIS — L03.113 CELLULITIS OF RIGHT ARM: Primary | ICD-10-CM

## 2024-01-23 DIAGNOSIS — E11.65 TYPE 2 DIABETES MELLITUS WITH HYPERGLYCEMIA, WITHOUT LONG-TERM CURRENT USE OF INSULIN: ICD-10-CM

## 2024-01-23 LAB — HBA1C MFR BLD: 7.4 %

## 2024-01-23 PROCEDURE — 83036 HEMOGLOBIN GLYCOSYLATED A1C: CPT | Mod: QW,,, | Performed by: PHYSICIAN ASSISTANT

## 2024-01-23 PROCEDURE — 3008F BODY MASS INDEX DOCD: CPT | Mod: CPTII,S$GLB,, | Performed by: PHYSICIAN ASSISTANT

## 2024-01-23 PROCEDURE — 1159F MED LIST DOCD IN RCRD: CPT | Mod: CPTII,S$GLB,, | Performed by: PHYSICIAN ASSISTANT

## 2024-01-23 PROCEDURE — 99214 OFFICE O/P EST MOD 30 MIN: CPT | Mod: S$GLB,,, | Performed by: PHYSICIAN ASSISTANT

## 2024-01-23 PROCEDURE — 3075F SYST BP GE 130 - 139MM HG: CPT | Mod: CPTII,S$GLB,, | Performed by: PHYSICIAN ASSISTANT

## 2024-01-23 PROCEDURE — 3079F DIAST BP 80-89 MM HG: CPT | Mod: CPTII,S$GLB,, | Performed by: PHYSICIAN ASSISTANT

## 2024-01-23 RX ORDER — MUPIROCIN 20 MG/G
OINTMENT TOPICAL 3 TIMES DAILY
Qty: 30 G | Refills: 0 | Status: SHIPPED | OUTPATIENT
Start: 2024-01-23 | End: 2024-02-02

## 2024-01-23 RX ORDER — PRAVASTATIN SODIUM 10 MG/1
10 TABLET ORAL DAILY
Qty: 90 TABLET | Refills: 3 | Status: SHIPPED | OUTPATIENT
Start: 2024-01-23 | End: 2025-01-22

## 2024-01-23 RX ORDER — SULFAMETHOXAZOLE AND TRIMETHOPRIM 800; 160 MG/1; MG/1
1 TABLET ORAL 2 TIMES DAILY
Qty: 20 TABLET | Refills: 0 | Status: SHIPPED | OUTPATIENT
Start: 2024-01-23 | End: 2024-02-02

## 2024-01-23 RX ORDER — METFORMIN HYDROCHLORIDE 500 MG/1
500 TABLET, EXTENDED RELEASE ORAL 2 TIMES DAILY WITH MEALS
Qty: 180 TABLET | Refills: 1 | Status: SHIPPED | OUTPATIENT
Start: 2024-01-23 | End: 2024-07-21

## 2024-01-23 NOTE — PROGRESS NOTES
SUBJECTIVE:    Patient ID: Tereza Weinstein is a 54 y.o. female.    Chief Complaint: Diabetes (No bottles//no meds for DM- check up because sugars >200 with diarrhea//dog scratch on right arm looking infected)    This is a 54-year-old female who presents today for regular six-month checkup.  Known diabetes.  A1c previously at 6.8% but diet controlled.  Has been getting readings more frequently in the 200s at home.  A1c risen to 7.4% today and agreeable to starting medication. Intolerant of glimepiride but does think she did ok with metformin.      She is seeing interventional pain management. Injections have greatly helped. Planning for another.    Diabetes  Hypoglycemia symptoms include nervousness/anxiousness. Pertinent negatives for hypoglycemia include no headaches. Pertinent negatives for diabetes include no chest pain.       Office Visit on 08/17/2023   Component Date Value Ref Range Status    Hemoglobin A1C, POC 08/17/2023 6.8  % Final       Past Medical History:   Diagnosis Date    Diabetes mellitus, type 2     Kidney stone     PONV (postoperative nausea and vomiting)      Past Surgical History:   Procedure Laterality Date    ANTEGRADE NEPHROSTOGRAPHY Right 12/11/2020    Procedure: NEPHROSTOGRAM, ANTEGRADE;  Surgeon: Yuko Hawkins MD;  Location: [facility] OR;  Service: Urology;  Laterality: Right;    CYSTOSCOPY W/ URETERAL STENT PLACEMENT Right 11/24/2020    Procedure: CYSTOSCOPY, WITH URETERAL STENT INSERTION;  Surgeon: Yuko Hawkins MD;  Location: [facility] OR;  Service: Urology;  Laterality: Right;    CYSTOSCOPY W/ URETERAL STENT REMOVAL Right 12/23/2020    Procedure: CYSTOSCOPY, WITH URETERAL STENT REMOVAL;  Surgeon: Yuko Hawkins MD;  Location: [facility] OR;  Service: Urology;  Laterality: Right;    HYSTERECTOMY      OOPHORECTOMY      PERCUTANEOUS NEPHROLITHOTRIPSY Right 12/11/2020    Procedure: NEPHROLITHOTRIPSY, PERCUTANEOUS;  Surgeon: Yuko Hawkins MD;  Location: [facility] OR;  Service:  Urology;  Laterality: Right;    PERCUTANEOUS NEPHROSTOMY Right 12/10/2020    Procedure: Creation, Nephrostomy, Percutaneous;  Surgeon: Raulito Diagnostic Provider;  Location: [facility];  Service: General;  Laterality: Right;     Family History   Problem Relation Age of Onset    Heart failure Father     Breast cancer Sister     Hypertension Sister     Diabetes type II Sister     Heart failure Sister     COPD Maternal Grandmother     Diabetes type II Brother     Diabetes type II Brother        Marital Status: Single  Alcohol History:  reports no history of alcohol use.  Tobacco History:  reports that she has been smoking cigarettes. She has been exposed to tobacco smoke. She has never used smokeless tobacco.  Drug History:  reports no history of drug use.    Review of patient's allergies indicates:   Allergen Reactions    Metformin      Cold intolerance    Penicillins      I was young unsure of reaction         Current Outpatient Medications:     blood sugar diagnostic Strp, To check BG 2 times daily, to use with insurance preferred meter, Disp: 200 each, Rfl: 1    EScitalopram oxalate (LEXAPRO) 20 MG tablet, Take 1 tablet (20 mg total) by mouth once daily., Disp: 90 tablet, Rfl: 1    lancets Misc, To check BG 2 times daily, to use with insurance preferred meter, Disp: 200 each, Rfl: 1    traZODone (DESYREL) 50 MG tablet, Take 1 tablet (50 mg total) by mouth nightly as needed for Insomnia., Disp: 30 tablet, Rfl: 11    TRUE METRIX AIR GLUCOSE METER [facility] USE AS DIRECTED TO CHECK BLOOD GLUCOSE TWICE DAILY, Disp: , Rfl:     metFORMIN (GLUCOPHAGE-XR) 500 MG ER 24hr tablet, Take 1 tablet (500 mg total) by mouth 2 (two) times daily with meals., Disp: 180 tablet, Rfl: 1    mupirocin (BACTROBAN) 2 % ointment, Apply topically 3 (three) times daily. for 10 days, Disp: 30 g, Rfl: 0    pravastatin (PRAVACHOL) 10 MG tablet, Take 1 tablet (10 mg total) by mouth once daily., Disp: 90 tablet, Rfl: 3    sulfamethoxazole-trimethoprim  "800-160mg (BACTRIM DS) 800-160 mg Tab, Take 1 tablet by mouth 2 (two) times daily. for 10 days, Disp: 20 tablet, Rfl: 0    Review of Systems   Constitutional:  Negative for activity change, appetite change and fever.   HENT:  Negative for postnasal drip, rhinorrhea and sinus pressure.    Eyes:  Negative for visual disturbance.   Respiratory:  Negative for cough and shortness of breath.    Cardiovascular:  Negative for chest pain.   Gastrointestinal:  Negative for abdominal distention and abdominal pain.   Genitourinary:  Negative for difficulty urinating and dysuria.   Musculoskeletal:  Negative for arthralgias and myalgias.   Skin:  Positive for color change, rash and wound.   Neurological:  Negative for headaches.   Hematological:  Negative for adenopathy.   Psychiatric/Behavioral:  The patient is nervous/anxious.           Objective:      Vitals:    01/23/24 0703   BP: 132/80   Pulse: 83   SpO2: 99%   Weight: 94.8 kg (209 lb)   Height: 5' 9" (1.753 m)     Physical Exam  Constitutional:       Appearance: Normal appearance.   HENT:      Head: Normocephalic and atraumatic.   Eyes:      Conjunctiva/sclera: Conjunctivae normal.   Cardiovascular:      Rate and Rhythm: Normal rate and regular rhythm.   Pulmonary:      Effort: Pulmonary effort is normal. No respiratory distress.      Breath sounds: Normal breath sounds. No wheezing.   Abdominal:      General: There is no distension.      Palpations: There is no mass.      Tenderness: There is no abdominal tenderness.   Musculoskeletal:      Right lower leg: No edema.      Left lower leg: No edema.   Skin:     Findings: No erythema.      Comments: 3 cm laceration right forearm. With mild cellulitis surrounding TTP.    Neurological:      Mental Status: She is alert and oriented to person, place, and time.   Psychiatric:         Behavior: Behavior normal.           Assessment:       1. Cellulitis of right arm    2. Type 2 diabetes mellitus with hyperglycemia, without " long-term current use of insulin    3. Screening for colon cancer    4. Family history of colon cancer         Plan:       Cellulitis of right arm  Comments:  bactroban topical and bactrim po given expquisite tenderness and cellulitis. if no improvement will call  Orders:  -     sulfamethoxazole-trimethoprim 800-160mg (BACTRIM DS) 800-160 mg Tab; Take 1 tablet by mouth 2 (two) times daily. for 10 days  Dispense: 20 tablet; Refill: 0  -     mupirocin (BACTROBAN) 2 % ointment; Apply topically 3 (three) times daily. for 10 days  Dispense: 30 g; Refill: 0    Type 2 diabetes mellitus with hyperglycemia, without long-term current use of insulin  Comments:  A1c up to 7.4% and off all meds. diet alone. add back met bid now. agreeable to recheck in 3 mos.  Orders:  -     pravastatin (PRAVACHOL) 10 MG tablet; Take 1 tablet (10 mg total) by mouth once daily.  Dispense: 90 tablet; Refill: 3  -     metFORMIN (GLUCOPHAGE-XR) 500 MG ER 24hr tablet; Take 1 tablet (500 mg total) by mouth 2 (two) times daily with meals.  Dispense: 180 tablet; Refill: 1    Screening for colon cancer  Comments:  hx of polyps. needs re screening asap. father with colon ca.  Orders:  -     Ambulatory referral/consult to Gastroenterology; Future; Expected date: 01/23/2024    Family history of colon cancer  -     Ambulatory referral/consult to Gastroenterology; Future; Expected date: 01/23/2024      Follow up in about 3 months (around 4/23/2024) for Diabetic Check-Up.        1/23/2024 Jeyson Portillo PA-C

## 2024-02-01 NOTE — TELEPHONE ENCOUNTER
----- Message from Ludmila Morris LPN sent at 1/9/2024 12:08 PM CST -----    ----- Message -----  From: Ina James  Sent: 1/9/2024  11:44 AM CST  To: Lindsey HANKINS Staff    Type:  Sooner Apoointment Request    Caller is requesting a sooner appointment.  Caller declined first available appointment listed below.  Caller will not accept being placed on the waitlist and is requesting a message be sent to doctor.    Name of Caller:pt  When is the first available appointment?n/a  Symptoms:annual  Would the patient rather a call back or a response via [employer]? call  Best Call Back Number: [phone]         Ayanna Gross is a 69 y.o. female on day 13 of admission presenting with Septic shock (CMS/HCC).    Subjective   Symptoms (0 - 10, Best to Worst)  [facility] Symptom Assessment System  Pain Score: 0 - No pain    Lying in bed, remains on Levophed drip for chronic persistent hypotension, complains of only mild sacral and heel pain today on current regimen.  Very tearful stating to me that she was going to die.    Objective     Physical Exam  Vitals and nursing note reviewed.   Constitutional:       General: She is not in acute distress.     Appearance: She is ill-appearing.   HENT:      Head: Normocephalic and atraumatic.      Nose: Nose normal.      Mouth/Throat:      Mouth: Mucous membranes are moist.      Pharynx: Oropharynx is clear.   Eyes:      Extraocular Movements: Extraocular movements intact.      Pupils: Pupils are equal, round, and reactive to light.   Cardiovascular:      Rate and Rhythm: Normal rate and regular rhythm.      Pulses: Normal pulses.      Heart sounds: No murmur heard.     Comments: A-line right arm  Dependent edema noted to both upper extremities  Pulmonary:      Effort: Pulmonary effort is normal. No respiratory distress.      Breath sounds: Normal breath sounds.   Abdominal:      General: Abdomen is flat. Bowel sounds are normal. There is no distension.      Palpations: Abdomen is soft.      Tenderness: There is no abdominal tenderness.   Musculoskeletal:         General: No swelling, tenderness, deformity or signs of injury. Normal range of motion.      Cervical back: Normal range of motion and neck supple. No rigidity.   Skin:     General: Skin is warm and dry.      Capillary Refill: Capillary refill takes 2 to 3 seconds.      Comments: Back and buttocks are not assessed, Documented rash, but pictures in epic showing erythematous rash with well-defined borders  Vascular ulcerations noted to the left anterior shin and bilateral heels with stable black eschar.   Neurological:      General:  "No focal deficit present.      Mental Status: She is alert and oriented to person, place, and time.      Motor: Weakness present.   Psychiatric:         Mood and Affect: Mood normal.      Comments: Tearful         Last Recorded Vitals  Blood pressure (!) 63/23, pulse 104, temperature 36.9 °C (98.4 °F), temperature source Temporal, resp. rate 20, height 1.575 m (5' 2\"), weight 103 kg (226 lb 13.7 oz), SpO2 97 %.  Intake/Output last 3 Shifts:  I/O last 3 completed shifts:  In: 819.4 (8.8 mL/kg) [P.O.:240; I.V.:579.4 (6.2 mL/kg)]  Out: 2150 (23.1 mL/kg) [Other:1700; Stool:450]  Dosing Weight: 93 kg     Relevant Results  Results for orders placed or performed during the hospital encounter of 01/19/24 (from the past 24 hour(s))   POCT GLUCOSE   Result Value Ref Range    POCT Glucose 187 (H) 74 - 99 mg/dL   POCT GLUCOSE   Result Value Ref Range    POCT Glucose 250 (H) 74 - 99 mg/dL   CBC   Result Value Ref Range    WBC 15.5 (H) 4.4 - 11.3 x10*3/uL    nRBC 0.0 0.0 - 0.0 /100 WBCs    RBC 3.13 (L) 4.00 - 5.20 x10*6/uL    Hemoglobin 9.2 (L) 12.0 - 16.0 g/dL    Hematocrit 29.6 (L) 36.0 - 46.0 %    MCV 95 80 - 100 fL    MCH 29.4 26.0 - 34.0 pg    MCHC 31.1 (L) 32.0 - 36.0 g/dL    RDW 25.0 (H) 11.5 - 14.5 %    Platelets 204 150 - 450 x10*3/uL   Comprehensive metabolic panel   Result Value Ref Range    Glucose 193 (H) 65 - 99 mg/dL    Sodium 131 (L) 133 - 145 mmol/L    Potassium 4.2 3.4 - 5.1 mmol/L    Chloride 99 97 - 107 mmol/L    Bicarbonate 21 (L) 24 - 31 mmol/L    Urea Nitrogen 10 8 - 25 mg/dL    Creatinine 2.40 (H) 0.40 - 1.60 mg/dL    eGFR 21 (L) >60 mL/min/1.73m*2    Calcium 8.3 (L) 8.5 - 10.4 mg/dL    Albumin 2.3 (L) 3.5 - 5.0 g/dL    Alkaline Phosphatase 239 (H) 35 - 125 U/L    Total Protein 4.9 (L) 5.9 - 7.9 g/dL    AST 32 5 - 40 U/L    Bilirubin, Total 1.1 0.1 - 1.2 mg/dL    ALT 13 5 - 40 U/L    Anion Gap 11 <=19 mmol/L   Magnesium   Result Value Ref Range    Magnesium 1.60 1.60 - 3.10 mg/dL   Phosphorus   Result " Value Ref Range    Phosphorus 2.5 2.5 - 4.5 mg/dL   POCT GLUCOSE   Result Value Ref Range    POCT Glucose 191 (H) 74 - 99 mg/dL   POCT GLUCOSE   Result Value Ref Range    POCT Glucose 209 (H) 74 - 99 mg/dL    No results found.     Assessment/Plan   IMP:    1.Acute Metabolic Encephalopathy, resolving    2.Recurrent Pleural Effusions, CHF   3. Septic Shock  4. Vasculopathy   5. Hx of Atrial fibrillation   6. Hx of endocarditis    7.  Malnutrition  8. ESRD   9. Metabolic lactic acidosis, resolved   10. T2DM  11. PAD with vascular/pressure wounds  12. Hx of recurrent C. Diff infection  13. Hx of endocarditis     Full code full  Capable  Documented healthcare power of  is the daughter Meka, copy of POA in the chart.    Chart reviewed, discussed with attending service, this is a patient had initially presented with septic shock respiratory failure requiring pressor support and multiple thoracenteses, has slowly been able to titrate off of her higher oxygen requirements is currently on 2 L nasal cannula.  She is now off of IV antibiotics with the exception of doxycycline which she is on for prophylaxis for her history of MRSA endocarditis/bacteremia and oral vancomycin for her history of C. difficile.  However she remains on Levophed with a systolic in the 70s.  Patient is unable to further titrate on the Levophed and we are faced with a decision regarding proceeding forward with transition to LTAC however patient is making statements about wishing to go home.  Per attending service, they had an extensive 2-hour discussion with the patient herself today as she was capable, please see note from attending service.  Patient stating that she wished to go home, considering hospice for supportive care very aware that her status is quite fragile and that she is progressively worsened to a point where we cannot sustain her outside of a hospital type setting.  Previous goals of care discussion had established a full code  with an aggressive disease modifying model of care, and daughters were supportive of this.  Yet this point there seems to be conflict between what the patient is currently stating and the previously established goals.    1/31/24  Family meeting with Vinod Livingston PA-C, jose Sanchez, jose Ackerman, and patient. Discussion regarding poor prognosis, no forward progress on levophed with worsening tolerance of dialysis. Increased pain to sacrum and heels. Hospice referral called in, meeting set up with family for Friday at 1730.     2/1/24  Patient remains undecided at this time whether she would pursue a hospice model of care or transitioning to an LTAC on chronic Levophed.  I discussed with the patient that her prognosis is poor either way, as she will continue to decline and most likely rehospitalize even if she discharges to LTAC as she is high risk for recurrent infection.  I also discussed CODE STATUS with the patient, given her poor prognosis and multiple comorbidities, CPR likely to be of no benefit, with a higher risk that it would cause more harm than good.  Patient very tearful during discussion did not want to make a decision at this time.  I did encourage the patient to think of her goals and what is important to her at this point she is focusing on being alive for her grandchildren and her children.  She has a hospice informational meeting set up for Friday at 530 with both of her daughters.    I spent 90 minutes in the professional and overall care of this patient.      Jazmin Sanchez, APRN-CNP

## 2024-02-29 ENCOUNTER — TELEPHONE (OUTPATIENT)
Dept: FAMILY MEDICINE | Facility: CLINIC | Age: 54
End: 2024-02-29
Payer: COMMERCIAL

## 2024-02-29 NOTE — TELEPHONE ENCOUNTER
----- Message from Lorri Sanford sent at 2/29/2024  3:32 PM CST -----  Pt has an earache and needs to be seen asap. Pt #[phone]

## 2024-02-29 NOTE — TELEPHONE ENCOUNTER
Told her there are no openings tomorrow in clinic, I will call her if there are any cancellations when I get here in the morning, otherwise she needs to be checked at . Especially now that she feels feverish

## 2024-03-12 ENCOUNTER — PATIENT MESSAGE (OUTPATIENT)
Dept: ADMINISTRATIVE | Facility: HOSPITAL | Age: 54
End: 2024-03-12
Payer: COMMERCIAL

## 2024-03-27 ENCOUNTER — TELEPHONE (OUTPATIENT)
Dept: FAMILY MEDICINE | Facility: CLINIC | Age: 54
End: 2024-03-27
Payer: COMMERCIAL

## 2024-03-28 ENCOUNTER — OFFICE VISIT (OUTPATIENT)
Dept: ORTHOPEDICS | Facility: CLINIC | Age: 54
End: 2024-03-28
Payer: COMMERCIAL

## 2024-03-28 ENCOUNTER — PATIENT MESSAGE (OUTPATIENT)
Dept: ADMINISTRATIVE | Facility: HOSPITAL | Age: 54
End: 2024-03-28
Payer: COMMERCIAL

## 2024-03-28 VITALS — WEIGHT: 197 LBS | BODY MASS INDEX: 28.2 KG/M2 | HEIGHT: 70 IN

## 2024-03-28 DIAGNOSIS — M65.311 TRIGGER FINGER OF RIGHT THUMB: Primary | ICD-10-CM

## 2024-03-28 PROCEDURE — 3051F HG A1C>EQUAL 7.0%<8.0%: CPT | Mod: CPTII,S$GLB,, | Performed by: PHYSICIAN ASSISTANT

## 2024-03-28 PROCEDURE — 1159F MED LIST DOCD IN RCRD: CPT | Mod: CPTII,S$GLB,, | Performed by: PHYSICIAN ASSISTANT

## 2024-03-28 PROCEDURE — 3008F BODY MASS INDEX DOCD: CPT | Mod: CPTII,S$GLB,, | Performed by: PHYSICIAN ASSISTANT

## 2024-03-28 PROCEDURE — 1160F RVW MEDS BY RX/DR IN RCRD: CPT | Mod: CPTII,S$GLB,, | Performed by: PHYSICIAN ASSISTANT

## 2024-03-28 PROCEDURE — 20550 NJX 1 TENDON SHEATH/LIGAMENT: CPT | Mod: RT,S$GLB,, | Performed by: PHYSICIAN ASSISTANT

## 2024-03-28 PROCEDURE — 99203 OFFICE O/P NEW LOW 30 MIN: CPT | Mod: 25,S$GLB,, | Performed by: PHYSICIAN ASSISTANT

## 2024-03-28 RX ORDER — TRIAMCINOLONE ACETONIDE 40 MG/ML
40 INJECTION, SUSPENSION INTRA-ARTICULAR; INTRAMUSCULAR
Status: DISCONTINUED | OUTPATIENT
Start: 2024-03-28 | End: 2024-03-28 | Stop reason: HOSPADM

## 2024-03-28 RX ADMIN — TRIAMCINOLONE ACETONIDE 40 MG: 40 INJECTION, SUSPENSION INTRA-ARTICULAR; INTRAMUSCULAR at 08:03

## 2024-03-28 NOTE — PROCEDURES
Tendon Sheath    Date/Time: 3/28/2024 8:30 AM    Performed by: Benji Isidro PA  Authorized by: Benji Isidro PA    Consent Done?:  Yes (Verbal)  Indications:  Pain  Site marked: the procedure site was marked    Timeout: prior to procedure the correct patient, procedure, and site was verified    Prep: patient was prepped and draped in usual sterile fashion      Location:  Thumb  Site:  R thumb flexor tendon sheath  Ultrasonic guidance for needle placement?: No    Needle size:  25 G  Medications:  40 mg triamcinolone acetonide 40 mg/mL  Patient tolerance:  Patient tolerated the procedure well with no immediate complications

## 2024-03-28 NOTE — PROGRESS NOTES
[facility] ORTHOPEDICS  [address]  [place] [address]  Phone: [phone]   [phone]    Patient's PCP: Jeyson Portillo PA-C  Referring Provider: No ref. provider found    Subjective:      Chief Complaint:   Chief Complaint   Patient presents with    Right Hand - Pain     Right thumb pain with some popping/locking x 2 weeks. No specific injury or cause per patient       Past Medical History:   Diagnosis Date    Diabetes mellitus, type 2     Kidney stone     PONV (postoperative nausea and vomiting)        Past Surgical History:   Procedure Laterality Date    ANTEGRADE NEPHROSTOGRAPHY Right 12/11/2020    Procedure: NEPHROSTOGRAM, ANTEGRADE;  Surgeon: Yuko Hawkins MD;  Location: [facility] OR;  Service: Urology;  Laterality: Right;    CYSTOSCOPY W/ URETERAL STENT PLACEMENT Right 11/24/2020    Procedure: CYSTOSCOPY, WITH URETERAL STENT INSERTION;  Surgeon: Yuko Hawkins MD;  Location: [facility] OR;  Service: Urology;  Laterality: Right;    CYSTOSCOPY W/ URETERAL STENT REMOVAL Right 12/23/2020    Procedure: CYSTOSCOPY, WITH URETERAL STENT REMOVAL;  Surgeon: Yuko Hawkins MD;  Location: [facility] OR;  Service: Urology;  Laterality: Right;    HYSTERECTOMY      OOPHORECTOMY      PERCUTANEOUS NEPHROLITHOTRIPSY Right 12/11/2020    Procedure: NEPHROLITHOTRIPSY, PERCUTANEOUS;  Surgeon: Yuko Hawkins MD;  Location: [facility];  Service: Urology;  Laterality: Right;    PERCUTANEOUS NEPHROSTOMY Right 12/10/2020    Procedure: Creation, Nephrostomy, Percutaneous;  Surgeon: Doskalyn Diagnostic Provider;  Location: [facility];  Service: General;  Laterality: Right;       Current Outpatient Medications   Medication Sig    blood sugar diagnostic Strp To check BG 2 times daily, to use with insurance preferred meter    EScitalopram oxalate (LEXAPRO) 20 MG tablet Take 1 tablet (20 mg total) by mouth once daily.    lancets Misc To check BG 2 times daily, to use with insurance preferred meter    metFORMIN  (GLUCOPHAGE-XR) 500 MG ER 24hr tablet Take 1 tablet (500 mg total) by mouth 2 (two) times daily with meals.    pravastatin (PRAVACHOL) 10 MG tablet Take 1 tablet (10 mg total) by mouth once daily.    traZODone (DESYREL) 50 MG tablet Take 1 tablet (50 mg total) by mouth nightly as needed for Insomnia.    TRUE METRIX AIR GLUCOSE METER Misc USE AS DIRECTED TO CHECK BLOOD GLUCOSE TWICE DAILY     No current facility-administered medications for this visit.       Review of patient's allergies indicates:   Allergen Reactions    Metformin      Cold intolerance    Penicillins      I was young unsure of reaction         Family History   Problem Relation Age of Onset    Heart failure Father     Breast cancer Sister     Hypertension Sister     Diabetes type II Sister     Heart failure Sister     COPD Maternal Grandmother     Diabetes type II Brother     Diabetes type II Brother        Social History     Socioeconomic History    Marital status: Single   Tobacco Use    Smoking status: Some Days     Current packs/day: 0.50     Types: Cigarettes     Passive exposure: Current    Smokeless tobacco: Never   Substance and Sexual Activity    Alcohol use: No    Drug use: No     Social Determinants of Health     Financial Resource Strain: Medium Risk (9/6/2022)    Overall Financial Resource Strain (CARDIA)     Difficulty of Paying Living Expenses: Somewhat hard   Food Insecurity: No Food Insecurity (9/6/2022)    Hunger Vital Sign     Worried About Running Out of Food in the Last Year: Never true     Ran Out of Food in the Last Year: Never true   Transportation Needs: No Transportation Needs (9/6/2022)    PRAPARE - Transportation     Lack of Transportation (Medical): No     Lack of Transportation (Non-Medical): No   Physical Activity: Sufficiently Active (9/6/2022)    Exercise Vital Sign     Days of Exercise per Week: 5 days     Minutes of Exercise per Session: 30 min   Stress: Stress Concern Present (9/6/2022)    [employer]  [employer]up[employer] - Occupational Stress Questionnaire     Feeling of Stress : To some extent   Social Connections: Unknown (9/6/2022)    Social Connection and Isolation Panel [NHANES]     Frequency of Communication with Friends and Family: More than three times a week     Frequency of Social Gatherings with Friends and Family: Twice a week     Active Member of Clubs or Organizations: No     Attends Club or Organization Meetings: Never     Marital Status:    Housing Stability: Low Risk  (9/6/2022)    Housing Stability Vital Sign     Unable to Pay for Housing in the Last Year: No     Number of Places Lived in the Last Year: 1     Unstable Housing in the Last Year: No       Prior to meeting with the patient I reviewed the medical chart in [facility]. This included reviewing the previous progress notes from our office, review of the patient's last appointment with their primary care provider, review of any visits to the emergency room, and review of any pain management appointments or procedures.    History of present illness:  54-year-old female, presents to clinic today for evaluation of complaints of right thumb pain.  She has a trigger thumb.  Visible triggering of the thumb.  Pain at the IP and at the MCP joint.  Volar aspect.      Review of Systems:    Constitutional: Negative for chills, fever and weight loss.   HENT: Negative for congestion.    Eyes: Negative for discharge and redness.   Respiratory: Negative for cough and shortness of breath.    Cardiovascular: Negative for chest pain.   Gastrointestinal: Negative for nausea and vomiting.   Musculoskeletal: See HPI.   Skin: Negative for rash.   Neurological: Negative for headaches.   Endo/Heme/Allergies: Does not bruise/bleed easily.   Psychiatric/Behavioral: The patient is not nervous/anxious.    All other systems reviewed and are negative.       Objective:      Physical Examination:    Vital Signs:  There were no vitals filed for this visit.    Body mass  index is 28.27 kg/m².    This a well-developed, well nourished patient in no acute distress.  They are alert and oriented and cooperative to examination.     Examination of the right hand, skin is dry and intact, no erythema ecchymosis, no signs symptoms of infection.  She has pain over the A1 pulley, she has visible triggering of the thumb.  Otherwise normal range of motion in the right hand.  She can oppose all fingers, she can make a fist but the thumb we will trigger.  Distally neurovascularly intact.  No pain in the basilar joint.      Pertinent New Results:        XRAY Report / Interpretation:   AP lateral oblique views of the right thumb taken today in the office do demonstrate evidence of basilar arthrosis.  Some subluxation basilar joint.  Otherwise no significant arthritic changes are appreciated.  Visualized soft normal.          Assessment:       1. Trigger finger of right thumb      Plan:     Trigger finger of right thumb  -     X-Ray Hand Complete Right  -     Tendon Sheath        Follow up if symptoms worsen or fail to improve.    Right trigger thumb, we injected the A1 pulley lidocaine and triamcinolone.  Provided immediate relief in the patient's symptoms much improved range of motion.  Still some palpable triggering but patient has full range of motion.  We will check her back in a month.  If she is doing well she can simply cancel in follow-up as needed.  We discussed trigger finger release if symptoms returned.      LIZ Grullon, PA-C    This note was created using [employer] voice recognition software that occasionally misinterprets words or phrases.

## 2024-04-11 ENCOUNTER — TELEPHONE (OUTPATIENT)
Dept: FAMILY MEDICINE | Facility: CLINIC | Age: 54
End: 2024-04-11
Payer: COMMERCIAL

## 2024-04-11 ENCOUNTER — TELEPHONE (OUTPATIENT)
Dept: URGENT CARE | Facility: CLINIC | Age: 54
End: 2024-04-11

## 2024-04-11 ENCOUNTER — HOSPITAL ENCOUNTER (OUTPATIENT)
Dept: RADIOLOGY | Facility: HOSPITAL | Age: 54
Discharge: HOME | End: 2024-04-11
Attending: NURSE PRACTITIONER
Payer: COMMERCIAL

## 2024-04-11 ENCOUNTER — OFFICE VISIT (OUTPATIENT)
Dept: URGENT CARE | Facility: CLINIC | Age: 54
End: 2024-04-11
Payer: COMMERCIAL

## 2024-04-11 VITALS
HEART RATE: 75 BPM | DIASTOLIC BLOOD PRESSURE: 83 MMHG | BODY MASS INDEX: 28.14 KG/M2 | OXYGEN SATURATION: 97 % | WEIGHT: 190 LBS | RESPIRATION RATE: 16 BRPM | TEMPERATURE: 98 F | SYSTOLIC BLOOD PRESSURE: 128 MMHG | HEIGHT: 69 IN

## 2024-04-11 DIAGNOSIS — R31.9 URINARY TRACT INFECTION WITH HEMATURIA, SITE UNSPECIFIED: ICD-10-CM

## 2024-04-11 DIAGNOSIS — Z87.448 HISTORY OF HYDRONEPHROSIS: ICD-10-CM

## 2024-04-11 DIAGNOSIS — N39.0 URINARY TRACT INFECTION WITH HEMATURIA, SITE UNSPECIFIED: ICD-10-CM

## 2024-04-11 DIAGNOSIS — Z87.442 HISTORY OF RENAL CALCULI: ICD-10-CM

## 2024-04-11 DIAGNOSIS — R10.9 FLANK PAIN: ICD-10-CM

## 2024-04-11 DIAGNOSIS — R10.9 FLANK PAIN: Primary | ICD-10-CM

## 2024-04-11 DIAGNOSIS — N20.0 RIGHT RENAL STONE: ICD-10-CM

## 2024-04-11 PROBLEM — M54.17 LUMBOSACRAL RADICULITIS: Status: ACTIVE | Noted: 2024-02-07

## 2024-04-11 LAB
BILIRUB UR QL STRIP: NEGATIVE
GLUCOSE UR QL STRIP: NEGATIVE
KETONES UR QL STRIP: NEGATIVE
LEUKOCYTE ESTERASE UR QL STRIP: POSITIVE
PH, POC UA: 6
POC BLOOD, URINE: POSITIVE
POC NITRATES, URINE: NEGATIVE
PROT UR QL STRIP: NEGATIVE
SP GR UR STRIP: 1.02 (ref 1–1.03)
UROBILINOGEN UR STRIP-ACNC: ABNORMAL (ref 0.1–1.1)

## 2024-04-11 PROCEDURE — 96372 THER/PROPH/DIAG INJ SC/IM: CPT | Mod: S$GLB,,, | Performed by: NURSE PRACTITIONER

## 2024-04-11 PROCEDURE — 81003 URINALYSIS AUTO W/O SCOPE: CPT | Mod: QW,S$GLB,, | Performed by: NURSE PRACTITIONER

## 2024-04-11 PROCEDURE — 99214 OFFICE O/P EST MOD 30 MIN: CPT | Mod: 25,S$GLB,, | Performed by: NURSE PRACTITIONER

## 2024-04-11 PROCEDURE — 74176 CT ABD & PELVIS W/O CONTRAST: CPT | Mod: TC

## 2024-04-11 PROCEDURE — 74176 CT ABD & PELVIS W/O CONTRAST: CPT | Mod: 26,,, | Performed by: RADIOLOGY

## 2024-04-11 RX ORDER — SULFAMETHOXAZOLE AND TRIMETHOPRIM 800; 160 MG/1; MG/1
1 TABLET ORAL 2 TIMES DAILY
Qty: 14 TABLET | Refills: 0 | Status: SHIPPED | OUTPATIENT
Start: 2024-04-11 | End: 2024-04-18

## 2024-04-11 RX ORDER — KETOROLAC TROMETHAMINE 10 MG/1
10 TABLET, FILM COATED ORAL EVERY 6 HOURS
Qty: 20 TABLET | Refills: 0 | Status: SHIPPED | OUTPATIENT
Start: 2024-04-11 | End: 2024-04-11

## 2024-04-11 RX ORDER — KETOROLAC TROMETHAMINE 30 MG/ML
30 INJECTION, SOLUTION INTRAMUSCULAR; INTRAVENOUS
Status: COMPLETED | OUTPATIENT
Start: 2024-04-11 | End: 2024-04-11

## 2024-04-11 RX ORDER — HYDROCODONE BITARTRATE AND ACETAMINOPHEN 5; 325 MG/1; MG/1
1 TABLET ORAL EVERY 6 HOURS PRN
Qty: 16 TABLET | Refills: 0 | Status: SHIPPED | OUTPATIENT
Start: 2024-04-11

## 2024-04-11 RX ORDER — KETOROLAC TROMETHAMINE 10 MG/1
10 TABLET, FILM COATED ORAL EVERY 6 HOURS
Qty: 20 TABLET | Refills: 0 | Status: SHIPPED | OUTPATIENT
Start: 2024-04-11 | End: 2024-04-16

## 2024-04-11 RX ADMIN — KETOROLAC TROMETHAMINE 30 MG: 30 INJECTION, SOLUTION INTRAMUSCULAR; INTRAVENOUS at 05:04

## 2024-04-11 NOTE — TELEPHONE ENCOUNTER
Kidney spasms, sugar high, lips tingling, denied dysuria, advised to get an urgent evaluation today for the variety of sy she's having. Voiced understanding and will keep her appt with Luis Manuel on the 30th

## 2024-04-11 NOTE — PATIENT INSTRUCTIONS
Go directly over to [facility] which is now called [facility] and check into the outpatient registration which is located just to the right-hand side of the emergency department entrance.  Inform them that you were there for outpatient CT scan and that the orders are in the computer.  Once the CT scan has been done you may go home and I will contact you later in the day once I have received results and had time to review them as long as they have been sent to us by time of close otherwise will contact you 1st thing in the morning.    Bactrim twice a day for 7 days.    Toradol as prescribed as needed for flank pain.  Always take with food.  Do not take any other anti-inflammatories or NSAIDs while taking Toradol such as ibuprofen or Naprosyn  Norco for breakthrough pain not relieved byToradol alone    Go to ER promptly for any worsening of symptoms, onset of fever/chills/vomiting, worsening of pain not managed at home with pain medications prescribed.    Continue to increase your fluid intake significantly to help flush urinary system

## 2024-04-11 NOTE — PROGRESS NOTES
"Subjective:      Patient ID: Tereza Weinstein is a 54 y.o. female.    Vitals:  height is 5' 9" (1.753 m) and weight is 86.2 kg (190 lb). Her temperature is 98 °F (36.7 °C). Her blood pressure is 128/83 and her pulse is 75. Her respiration is 16 and oxygen saturation is 97%.     Chief Complaint: Flank Pain    Pt c/o R side flank pain, bloody urine. Pt has hx of kidney stones, kidney stents.     Flank Pain  This is a new problem. The current episode started today. Pertinent negatives include no abdominal pain, dysuria, fever or pelvic pain.       Constitution: Positive for chills. Negative for appetite change and fever.   Gastrointestinal:  Negative for abdominal pain, nausea, vomiting and diarrhea.   Genitourinary:  Positive for flank pain (right) and hematuria. Negative for dysuria, frequency, urgency and pelvic pain.   Skin:  Negative for rash and lesion.      Objective:     Physical Exam   Constitutional: She is oriented to person, place, and time.  Non-toxic appearance. She does not appear ill. No distress.   HENT:   Head: Normocephalic and atraumatic.   Nose: Nose normal.   Mouth/Throat: Mucous membranes are moist.   Eyes: Conjunctivae are normal.   Cardiovascular: Normal rate.   Pulmonary/Chest: Effort normal. No respiratory distress.   Abdominal: Normal appearance. Soft. flat abdomen There is no left CVA tenderness and no right CVA tenderness.   Neurological: no focal deficit. She is alert and oriented to person, place, and time.   Skin: Skin is warm, dry and no rash. Capillary refill takes 2 to 3 seconds. No lesion   Psychiatric: Her behavior is normal. Mood normal.   Nursing note and vitals reviewed.      Assessment:     1. Flank pain    2. Urinary tract infection with hematuria, site unspecified    3. History of hydronephrosis    4. History of renal calculi    5. Right renal stone      UA: 15 wbc's, 200 rbc's, neg nit.   Urine culture pending  CT renal study: COMPARISON:  12/12/2020     FINDINGS:  Liver, " spleen, adrenal glands, pancreas unremarkable appearance.  No calcified stones in the gallbladder or CT findings of acute cholecystitis.  No biliary duct dilatation.  Abdominal aorta tapers without aneurysmal dilatation.     No free intraperitoneal air or fluid.  Diverticulosis without CT findings of acute diverticulitis.  No appreciable bowel wall thickening or inflammatory change.  Appendix normal in appearance.     Kidneys, ureters, bladder; punctate nonobstructing right renal stone.  No opaque left renal stone.  No hydronephrosis, ureteral dilatation, opaque ureteral stone or ureteral obstruction evident.     1.9 cm low-density upper pole left renal mass suggesting a cyst but with questionable nodule along the lateral wall for example axial series 2, image 43.  This corresponds as was measured at 1.5 cm on prior study 12/12/2020     6 mm exophytic indeterminate right renal mass axial series 2, image 67 appearing of higher density than expected for a simple cyst and not of as high density as can be attributed to a high density cyst.  This however does not appear significantly changed compared to prior CT of 12/12/2020     Urinary bladder mildly distended at time of the exam and as visualized unremarkable appearance     Reproductive organs; prior hysterectomy.  Adnexal region unremarkable appearance     Osseous structures show degenerative changes with no obvious aggressive appearing osseous lesion.  There is severe facet arthropathy in the lower lumbar spine.     Impression:     Punctate nonobstructing right renal stone with no hydronephrosis, opaque ureteral stone or ureteral obstruction.     1.9 cm hypodensity upper pole of the left kidney suggesting cyst but with questionable thickening or nodularity in the lateral wall.  Corresponds as was measured at 1.5 cm on prior study 12/12/2020.  Indeterminate 6 mm right renal mass not appearing significantly changed compared the prior study 12/12/2020.  Recommend  further evaluation follow-up CT renal mass protocol without and with contrast     Additional findings as detailed above including diverticulosis without CT findings of acute diverticulitis.  Prior hysterectomy.  Normal appearance of the appendix.  Plan:       Flank pain  -     POCT Urinalysis, Dipstick, Automated, W/O Scope  -     Urine culture  -     CT Renal Stone Study ABD Pelvis WO; Future; Expected date: 04/11/2024  -     ketorolac injection 30 mg  -     Discontinue: ketorolac (TORADOL) 10 mg tablet; Take 1 tablet (10 mg total) by mouth every 6 (six) hours. Take with food for 5 days  Dispense: 20 tablet; Refill: 0  -     sulfamethoxazole-trimethoprim 800-160mg (BACTRIM DS) 800-160 mg Tab; Take 1 tablet by mouth 2 (two) times daily. for 7 days  Dispense: 14 tablet; Refill: 0  -     HYDROcodone-acetaminophen (NORCO) 5-325 mg per tablet; Take 1 tablet by mouth every 6 (six) hours as needed for Pain.  Dispense: 16 tablet; Refill: 0  -     ketorolac (TORADOL) 10 mg tablet; Take 1 tablet (10 mg total) by mouth every 6 (six) hours. Take with food for 5 days  Dispense: 20 tablet; Refill: 0    Urinary tract infection with hematuria, site unspecified  -     Urine culture  -     CT Renal Stone Study ABD Pelvis WO; Future; Expected date: 04/11/2024  -     sulfamethoxazole-trimethoprim 800-160mg (BACTRIM DS) 800-160 mg Tab; Take 1 tablet by mouth 2 (two) times daily. for 7 days  Dispense: 14 tablet; Refill: 0    History of hydronephrosis    History of renal calculi  -     CT Renal Stone Study ABD Pelvis WO; Future; Expected date: 04/11/2024  -     ketorolac injection 30 mg  -     Discontinue: ketorolac (TORADOL) 10 mg tablet; Take 1 tablet (10 mg total) by mouth every 6 (six) hours. Take with food for 5 days  Dispense: 20 tablet; Refill: 0  -     HYDROcodone-acetaminophen (NORCO) 5-325 mg per tablet; Take 1 tablet by mouth every 6 (six) hours as needed for Pain.  Dispense: 16 tablet; Refill: 0  -     ketorolac (TORADOL) 10  mg tablet; Take 1 tablet (10 mg total) by mouth every 6 (six) hours. Take with food for 5 days  Dispense: 20 tablet; Refill: 0    Right renal stone

## 2024-04-11 NOTE — TELEPHONE ENCOUNTER
----- Message from Filomena Puga sent at 4/11/2024  8:28 AM CDT -----  Pt having a lot of blood in her urine and sugars have been high. Pt would like to be seen today if possible prefers a later appointment if so.  [phone]

## 2024-04-12 NOTE — TELEPHONE ENCOUNTER
Spoke with patient regarding CT results.  Informed of radiologist recommendation do have renal masses re-evaluated using renal mass protocol with and without contrast.  Inform patient she needs to call tomorrow and schedule close follow-up with her PCP with regards to this.  Patient verbalizes understanding.  Reinforced need to go to ER for any worsening of symptoms.

## 2024-04-15 ENCOUNTER — PATIENT MESSAGE (OUTPATIENT)
Dept: FAMILY MEDICINE | Facility: CLINIC | Age: 54
End: 2024-04-15
Payer: COMMERCIAL

## 2024-04-15 LAB
BACTERIA UR CULT: NO GROWTH
BACTERIA UR CULT: NORMAL

## 2024-04-16 DIAGNOSIS — N28.89 RENAL MASS: ICD-10-CM

## 2024-04-16 DIAGNOSIS — E21.0 PRIMARY HYPERPARATHYROIDISM: ICD-10-CM

## 2024-04-16 DIAGNOSIS — Z79.899 ENCOUNTER FOR LONG-TERM (CURRENT) USE OF OTHER MEDICATIONS: ICD-10-CM

## 2024-04-16 DIAGNOSIS — N20.0 NEPHROLITHIASIS: Primary | ICD-10-CM

## 2024-04-16 DIAGNOSIS — E83.52 HYPERCALCEMIA: ICD-10-CM

## 2024-04-16 DIAGNOSIS — E11.65 TYPE 2 DIABETES MELLITUS WITH HYPERGLYCEMIA, WITHOUT LONG-TERM CURRENT USE OF INSULIN: ICD-10-CM

## 2024-04-16 NOTE — TELEPHONE ENCOUNTER
Passed one stone, still has some kidney pain. Asking if anything other than hydrocodone, makes her very sick, asked about toradol

## 2024-04-16 NOTE — TELEPHONE ENCOUNTER
----- Message from Lorri Sanford sent at 4/16/2024 12:03 PM CDT -----  Does pt need any test done prior to her appt on 4/30/24 due to the abnormal MRI. Please advise. Pt #[phone]

## 2024-04-16 NOTE — TELEPHONE ENCOUNTER
We do need to get CT renal mass protocol with and without contrast. Indeterminate mass seen on imaging from the urgent care

## 2024-04-17 RX ORDER — TRAMADOL HYDROCHLORIDE 50 MG/1
50 TABLET ORAL EVERY 6 HOURS
Qty: 28 TABLET | Refills: 0 | Status: SHIPPED | OUTPATIENT
Start: 2024-04-17

## 2024-04-18 ENCOUNTER — TELEPHONE (OUTPATIENT)
Dept: FAMILY MEDICINE | Facility: CLINIC | Age: 54
End: 2024-04-18
Payer: COMMERCIAL

## 2024-04-18 NOTE — TELEPHONE ENCOUNTER
----- Message from Nathaly Mcginnis sent at 4/18/2024  4:07 PM CDT -----    3:51  She talked to someone the other day. She was told she needs an MRI  and labs. She has not heard anything about an appointment. PT'S #  [phone] GH

## 2024-04-18 NOTE — TELEPHONE ENCOUNTER
CT orders in, sent pt the number to scheduling at hospital in case she missed their call, [facility] orders are in for fasting labs, re-notified pt

## 2024-04-19 ENCOUNTER — HOSPITAL ENCOUNTER (OUTPATIENT)
Dept: RADIOLOGY | Facility: HOSPITAL | Age: 54
Discharge: HOME | End: 2024-04-19
Attending: PHYSICIAN ASSISTANT
Payer: COMMERCIAL

## 2024-04-19 DIAGNOSIS — N28.89 RENAL MASS: ICD-10-CM

## 2024-04-19 LAB
CREAT SERPL-MCNC: 0.8 MG/DL (ref 0.5–1.4)
SAMPLE: NORMAL

## 2024-04-19 PROCEDURE — 25500020 PHARM REV CODE 255

## 2024-04-19 PROCEDURE — 74178 CT ABD&PLV WO CNTR FLWD CNTR: CPT | Mod: TC

## 2024-04-19 PROCEDURE — 74178 CT ABD&PLV WO CNTR FLWD CNTR: CPT | Mod: 26,,, | Performed by: RADIOLOGY

## 2024-04-19 RX ADMIN — IOHEXOL 75 ML: 350 INJECTION, SOLUTION INTRAVENOUS at 04:04

## 2024-04-22 ENCOUNTER — LAB VISIT (OUTPATIENT)
Dept: LAB | Facility: HOSPITAL | Age: 54
End: 2024-04-22
Attending: PHYSICIAN ASSISTANT
Payer: COMMERCIAL

## 2024-04-22 ENCOUNTER — PATIENT MESSAGE (OUTPATIENT)
Dept: FAMILY MEDICINE | Facility: CLINIC | Age: 54
End: 2024-04-22
Payer: COMMERCIAL

## 2024-04-22 DIAGNOSIS — E83.52 HYPERCALCEMIA: ICD-10-CM

## 2024-04-22 DIAGNOSIS — Z79.899 ENCOUNTER FOR LONG-TERM (CURRENT) USE OF OTHER MEDICATIONS: ICD-10-CM

## 2024-04-22 DIAGNOSIS — E21.0 PRIMARY HYPERPARATHYROIDISM: ICD-10-CM

## 2024-04-22 DIAGNOSIS — R93.5 ABNORMAL ABDOMINAL CT SCAN: Primary | ICD-10-CM

## 2024-04-22 DIAGNOSIS — E11.610 DIABETIC NEUROGENIC ARTHROPATHY: Primary | ICD-10-CM

## 2024-04-22 DIAGNOSIS — N28.1 RENAL CYST: ICD-10-CM

## 2024-04-22 LAB
ALBUMIN SERPL BCP-MCNC: 3.7 G/DL (ref 3.5–5.2)
ALP SERPL-CCNC: 104 U/L (ref 55–135)
ALT SERPL W/O P-5'-P-CCNC: 18 U/L (ref 10–44)
ANION GAP SERPL CALC-SCNC: 11 MMOL/L (ref 8–16)
AST SERPL-CCNC: 12 U/L (ref 10–40)
BASOPHILS # BLD AUTO: 0.03 K/UL (ref 0–0.2)
BASOPHILS NFR BLD: 0.3 % (ref 0–1.9)
BILIRUB SERPL-MCNC: 0.5 MG/DL (ref 0.1–1)
BILIRUB UR QL STRIP: NEGATIVE
BUN SERPL-MCNC: 8 MG/DL (ref 6–20)
CALCIUM SERPL-MCNC: 9.9 MG/DL (ref 8.7–10.5)
CHLORIDE SERPL-SCNC: 104 MMOL/L (ref 95–110)
CHOLEST SERPL-MCNC: 184 MG/DL (ref 120–199)
CHOLEST/HDLC SERPL: 3.3 {RATIO} (ref 2–5)
CLARITY UR: CLEAR
CO2 SERPL-SCNC: 24 MMOL/L (ref 23–29)
COLOR UR: YELLOW
CREAT SERPL-MCNC: 0.8 MG/DL (ref 0.5–1.4)
DIFFERENTIAL METHOD BLD: ABNORMAL
EOSINOPHIL # BLD AUTO: 0.1 K/UL (ref 0–0.5)
EOSINOPHIL NFR BLD: 1.6 % (ref 0–8)
ERYTHROCYTE [DISTWIDTH] IN BLOOD BY AUTOMATED COUNT: 12.1 % (ref 11.5–14.5)
EST. GFR  (NO RACE VARIABLE): >60 ML/MIN/1.73 M^2
ESTIMATED AVG GLUCOSE: 151 MG/DL (ref 68–131)
GLUCOSE SERPL-MCNC: 158 MG/DL (ref 70–110)
GLUCOSE UR QL STRIP: NEGATIVE
HBA1C MFR BLD: 6.9 % (ref 4.5–6.2)
HCT VFR BLD AUTO: 45.2 % (ref 37–48.5)
HDLC SERPL-MCNC: 56 MG/DL (ref 40–75)
HDLC SERPL: 30.4 % (ref 20–50)
HGB BLD-MCNC: 15.3 G/DL (ref 12–16)
HGB UR QL STRIP: NEGATIVE
IMM GRANULOCYTES # BLD AUTO: 0.02 K/UL (ref 0–0.04)
IMM GRANULOCYTES NFR BLD AUTO: 0.2 % (ref 0–0.5)
KETONES UR QL STRIP: NEGATIVE
LDLC SERPL CALC-MCNC: 114.4 MG/DL (ref 63–159)
LEUKOCYTE ESTERASE UR QL STRIP: NEGATIVE
LYMPHOCYTES # BLD AUTO: 2.9 K/UL (ref 1–4.8)
LYMPHOCYTES NFR BLD: 33.9 % (ref 18–48)
MCH RBC QN AUTO: 33.8 PG (ref 27–31)
MCHC RBC AUTO-ENTMCNC: 33.8 G/DL (ref 32–36)
MCV RBC AUTO: 100 FL (ref 82–98)
MONOCYTES # BLD AUTO: 0.9 K/UL (ref 0.3–1)
MONOCYTES NFR BLD: 10.6 % (ref 4–15)
NEUTROPHILS # BLD AUTO: 4.6 K/UL (ref 1.8–7.7)
NEUTROPHILS NFR BLD: 53.4 % (ref 38–73)
NITRITE UR QL STRIP: NEGATIVE
NONHDLC SERPL-MCNC: 128 MG/DL
NRBC BLD-RTO: 0 /100 WBC
PH UR STRIP: 6 [PH] (ref 5–8)
PLATELET # BLD AUTO: 249 K/UL (ref 150–450)
PMV BLD AUTO: 9.6 FL (ref 9.2–12.9)
POTASSIUM SERPL-SCNC: 4.1 MMOL/L (ref 3.5–5.1)
PROT SERPL-MCNC: 6.5 G/DL (ref 6–8.4)
PROT UR QL STRIP: NEGATIVE
RBC # BLD AUTO: 4.53 M/UL (ref 4–5.4)
SODIUM SERPL-SCNC: 139 MMOL/L (ref 136–145)
SP GR UR STRIP: 1.01 (ref 1–1.03)
T4 FREE SERPL-MCNC: 0.91 NG/DL (ref 0.71–1.51)
TRIGL SERPL-MCNC: 68 MG/DL (ref 30–150)
TSH SERPL DL<=0.005 MIU/L-ACNC: 4.72 UIU/ML (ref 0.4–4)
URN SPEC COLLECT METH UR: NORMAL
UROBILINOGEN UR STRIP-ACNC: NEGATIVE EU/DL
WBC # BLD AUTO: 8.61 K/UL (ref 3.9–12.7)

## 2024-04-22 PROCEDURE — 84443 ASSAY THYROID STIM HORMONE: CPT | Performed by: PHYSICIAN ASSISTANT

## 2024-04-22 PROCEDURE — 80061 LIPID PANEL: CPT | Performed by: PHYSICIAN ASSISTANT

## 2024-04-22 PROCEDURE — 36415 COLL VENOUS BLD VENIPUNCTURE: CPT | Performed by: PHYSICIAN ASSISTANT

## 2024-04-22 PROCEDURE — 84439 ASSAY OF FREE THYROXINE: CPT | Performed by: PHYSICIAN ASSISTANT

## 2024-04-22 PROCEDURE — 81003 URINALYSIS AUTO W/O SCOPE: CPT | Performed by: PHYSICIAN ASSISTANT

## 2024-04-22 PROCEDURE — 85025 COMPLETE CBC W/AUTO DIFF WBC: CPT | Performed by: PHYSICIAN ASSISTANT

## 2024-04-22 PROCEDURE — 80053 COMPREHEN METABOLIC PANEL: CPT | Performed by: PHYSICIAN ASSISTANT

## 2024-04-22 PROCEDURE — 83036 HEMOGLOBIN GLYCOSYLATED A1C: CPT | Performed by: PHYSICIAN ASSISTANT

## 2024-04-25 ENCOUNTER — HOSPITAL ENCOUNTER (OUTPATIENT)
Dept: RADIOLOGY | Facility: HOSPITAL | Age: 54
Discharge: HOME | End: 2024-04-25
Attending: PHYSICIAN ASSISTANT
Payer: COMMERCIAL

## 2024-04-25 DIAGNOSIS — R93.5 ABNORMAL ABDOMINAL CT SCAN: ICD-10-CM

## 2024-04-25 DIAGNOSIS — N28.1 RENAL CYST: ICD-10-CM

## 2024-04-25 PROCEDURE — 76770 US EXAM ABDO BACK WALL COMP: CPT | Mod: TC

## 2024-04-25 PROCEDURE — 76770 US EXAM ABDO BACK WALL COMP: CPT | Mod: 26,,, | Performed by: RADIOLOGY

## 2024-04-30 ENCOUNTER — OFFICE VISIT (OUTPATIENT)
Dept: FAMILY MEDICINE | Facility: CLINIC | Age: 54
End: 2024-04-30
Payer: COMMERCIAL

## 2024-04-30 VITALS
BODY MASS INDEX: 30.81 KG/M2 | HEART RATE: 69 BPM | SYSTOLIC BLOOD PRESSURE: 129 MMHG | RESPIRATION RATE: 18 BRPM | DIASTOLIC BLOOD PRESSURE: 63 MMHG | WEIGHT: 208 LBS | OXYGEN SATURATION: 97 % | HEIGHT: 69 IN

## 2024-04-30 DIAGNOSIS — E11.65 TYPE 2 DIABETES MELLITUS WITH HYPERGLYCEMIA, WITHOUT LONG-TERM CURRENT USE OF INSULIN: Primary | ICD-10-CM

## 2024-04-30 DIAGNOSIS — Z80.0 FAMILY HISTORY OF COLON CANCER: ICD-10-CM

## 2024-04-30 DIAGNOSIS — G47.00 INSOMNIA, UNSPECIFIED TYPE: ICD-10-CM

## 2024-04-30 DIAGNOSIS — N28.1 RENAL CYST: ICD-10-CM

## 2024-04-30 DIAGNOSIS — N61.1 ABSCESS OF BREAST: ICD-10-CM

## 2024-04-30 PROCEDURE — 3008F BODY MASS INDEX DOCD: CPT | Mod: CPTII,S$GLB,, | Performed by: PHYSICIAN ASSISTANT

## 2024-04-30 PROCEDURE — 1159F MED LIST DOCD IN RCRD: CPT | Mod: CPTII,S$GLB,, | Performed by: PHYSICIAN ASSISTANT

## 2024-04-30 PROCEDURE — 3044F HG A1C LEVEL LT 7.0%: CPT | Mod: CPTII,S$GLB,, | Performed by: PHYSICIAN ASSISTANT

## 2024-04-30 PROCEDURE — 99214 OFFICE O/P EST MOD 30 MIN: CPT | Mod: S$GLB,,, | Performed by: PHYSICIAN ASSISTANT

## 2024-04-30 PROCEDURE — 3074F SYST BP LT 130 MM HG: CPT | Mod: CPTII,S$GLB,, | Performed by: PHYSICIAN ASSISTANT

## 2024-04-30 PROCEDURE — 3078F DIAST BP <80 MM HG: CPT | Mod: CPTII,S$GLB,, | Performed by: PHYSICIAN ASSISTANT

## 2024-04-30 RX ORDER — GABAPENTIN 100 MG/1
CAPSULE ORAL
COMMUNITY
End: 2024-04-30 | Stop reason: SDUPTHER

## 2024-04-30 RX ORDER — SULFAMETHOXAZOLE AND TRIMETHOPRIM 800; 160 MG/1; MG/1
1 TABLET ORAL 2 TIMES DAILY
Qty: 20 TABLET | Refills: 0 | Status: SHIPPED | OUTPATIENT
Start: 2024-04-30 | End: 2024-05-10

## 2024-04-30 RX ORDER — TRAZODONE HYDROCHLORIDE 50 MG/1
50 TABLET ORAL NIGHTLY PRN
Qty: 30 TABLET | Refills: 11 | Status: SHIPPED | OUTPATIENT
Start: 2024-04-30 | End: 2025-04-30

## 2024-04-30 RX ORDER — GABAPENTIN 100 MG/1
100 CAPSULE ORAL NIGHTLY
Qty: 90 CAPSULE | Refills: 1 | Status: SHIPPED | OUTPATIENT
Start: 2024-04-30 | End: 2024-10-27

## 2024-04-30 RX ORDER — TIRZEPATIDE 2.5 MG/.5ML
2.5 INJECTION, SOLUTION SUBCUTANEOUS
Qty: 2 ML | Refills: 2 | Status: SHIPPED | OUTPATIENT
Start: 2024-04-30 | End: 2024-05-06 | Stop reason: SDUPTHER

## 2024-04-30 RX ORDER — FLUTICASONE PROPIONATE 50 MCG
SPRAY, SUSPENSION (ML) NASAL
COMMUNITY

## 2024-04-30 NOTE — PROGRESS NOTES
SUBJECTIVE:    Patient ID: Tereza Weinstein is a 54 y.o. female.    Chief Complaint: Follow-up (No bottles// refills needed// lab results// pt states she's been very nausea off and on for 2 months // pt would like to discuss getting off metformin and switching to insulin // pt is due for eye exam / refused Colorectal Cancer Screening)    53yo female who presents for regular checkup. She did pass a stone a couple weeks prior. Pain and bleeding has resolved. NO UTI. Scans mariah from the urgent care. Evaluated with f/u contrasted study and u/s supported more complex cyst. Not solid. Will watch.    Reports nausea has been worse since starting the metformin. Wishes to try a different agent. Did well previously on mounjaro. She was called by the GI group to get c-scope scheduled and will get this done.    Follow-up  Associated symptoms include vomiting. Pertinent negatives include no arthralgias, chest pain, headaches, joint swelling, neck pain or weakness.       Lab Visit on 04/22/2024   Component Date Value Ref Range Status    Specimen UA 04/22/2024 Urine, Clean Catch   Final    Color, UA 04/22/2024 Yellow  Yellow, Straw, Lian Final    Appearance, UA 04/22/2024 Clear  Clear Final    pH, UA 04/22/2024 6.0  5.0 - 8.0 Final    Specific Gravity, UA 04/22/2024 1.015  1.005 - 1.030 Final    Protein, UA 04/22/2024 Negative  Negative Final    Glucose, UA 04/22/2024 Negative  Negative Final    Ketones, UA 04/22/2024 Negative  Negative Final    Bilirubin (UA) 04/22/2024 Negative  Negative Final    Occult Blood UA 04/22/2024 Negative  Negative Final    Nitrite, UA 04/22/2024 Negative  Negative Final    Urobilinogen, UA 04/22/2024 Negative  <2.0 EU/dL Final    Leukocytes, UA 04/22/2024 Negative  Negative Final    TSH 04/22/2024 4.718 (H)  0.400 - 4.000 uIU/mL Final    Sodium 04/22/2024 139  136 - 145 mmol/L Final    Potassium 04/22/2024 4.1  3.5 - 5.1 mmol/L Final    Chloride 04/22/2024 104  95 - 110 mmol/L Final    CO2  04/22/2024 24  23 - 29 mmol/L Final    Glucose 04/22/2024 158 (H)  70 - 110 mg/dL Final    BUN 04/22/2024 8  6 - 20 mg/dL Final    Creatinine 04/22/2024 0.8  0.5 - 1.4 mg/dL Final    Calcium 04/22/2024 9.9  8.7 - 10.5 mg/dL Final    Total Protein 04/22/2024 6.5  6.0 - 8.4 g/dL Final    Albumin 04/22/2024 3.7  3.5 - 5.2 g/dL Final    Total Bilirubin 04/22/2024 0.5  0.1 - 1.0 mg/dL Final    Alkaline Phosphatase 04/22/2024 104  55 - 135 U/L Final    AST 04/22/2024 12  10 - 40 U/L Final    ALT 04/22/2024 18  10 - 44 U/L Final    eGFR 04/22/2024 >60  >60 mL/min/1.73 m^2 Final    Anion Gap 04/22/2024 11  8 - 16 mmol/L Final    Cholesterol 04/22/2024 184  120 - 199 mg/dL Final    Triglycerides 04/22/2024 68  30 - 150 mg/dL Final    HDL 04/22/2024 56  40 - 75 mg/dL Final    LDL Cholesterol 04/22/2024 114.4  63.0 - 159.0 mg/dL Final    HDL/Cholesterol Ratio 04/22/2024 30.4  20.0 - 50.0 % Final    Total Cholesterol/HDL Ratio 04/22/2024 3.3  2.0 - 5.0 Final    Non-HDL Cholesterol 04/22/2024 128  mg/dL Final    WBC 04/22/2024 8.61  3.90 - 12.70 K/uL Final    RBC 04/22/2024 4.53  4.00 - 5.40 M/uL Final    Hemoglobin 04/22/2024 15.3  12.0 - 16.0 g/dL Final    Hematocrit 04/22/2024 45.2  37.0 - 48.5 % Final    MCV 04/22/2024 100 (H)  82 - 98 fL Final    MCH 04/22/2024 33.8 (H)  27.0 - 31.0 pg Final    MCHC 04/22/2024 33.8  32.0 - 36.0 g/dL Final    RDW 04/22/2024 12.1  11.5 - 14.5 % Final    Platelets 04/22/2024 249  150 - 450 K/uL Final    MPV 04/22/2024 9.6  9.2 - 12.9 fL Final    Immature Granulocytes 04/22/2024 0.2  0.0 - 0.5 % Final    Gran # (ANC) 04/22/2024 4.6  1.8 - 7.7 K/uL Final    Immature Grans (Abs) 04/22/2024 0.02  0.00 - 0.04 K/uL Final    Lymph # 04/22/2024 2.9  1.0 - 4.8 K/uL Final    Mono # 04/22/2024 0.9  0.3 - 1.0 K/uL Final    Eos # 04/22/2024 0.1  0.0 - 0.5 K/uL Final    Baso # 04/22/2024 0.03  0.00 - 0.20 K/uL Final    nRBC 04/22/2024 0  0 /100 WBC Final    Gran % 04/22/2024 53.4  38.0 - 73.0 % Final     Lymph % 04/22/2024 33.9  18.0 - 48.0 % Final    Mono % 04/22/2024 10.6  4.0 - 15.0 % Final    Eosinophil % 04/22/2024 1.6  0.0 - 8.0 % Final    Basophil % 04/22/2024 0.3  0.0 - 1.9 % Final    Differential Method 04/22/2024 Automated   Final    Hemoglobin A1C 04/22/2024 6.9 (H)  4.5 - 6.2 % Final    Estimated Avg Glucose 04/22/2024 151 (H)  68 - 131 mg/dL Final    Free T4 04/22/2024 0.91  0.71 - 1.51 ng/dL Final   Hospital Outpatient Visit on 04/19/2024   Component Date Value Ref Range Status    POC Creatinine 04/19/2024 0.8  0.5 - 1.4 mg/dL Final    Sample 04/19/2024 unknown   Final   Office Visit on 04/11/2024   Component Date Value Ref Range Status    POC Blood, Urine 04/11/2024 Positive (A)  Negative Final    POC Bilirubin, Urine 04/11/2024 Negative  Negative Final    POC Urobilinogen, Urine 04/11/2024 norm  0.1 - 1.1 Final    POC Ketones, Urine 04/11/2024 Negative  Negative Final    POC Protein, Urine 04/11/2024 Negative  Negative Final    POC Nitrates, Urine 04/11/2024 Negative  Negative Final    POC Glucose, Urine 04/11/2024 Negative  Negative Final    pH, UA 04/11/2024 6.0   Final    POC Specific Gravity, Urine 04/11/2024 1.020  1.003 - 1.029 Final    POC Leukocytes, Urine 04/11/2024 Positive (A)  Negative Final    Urine Culture, Routine 04/11/2024 Final report   Final    Result 1 04/11/2024 No growth   Final   Office Visit on 01/23/2024   Component Date Value Ref Range Status    Hemoglobin A1C, POC 01/23/2024 7.4  % Final       Past Medical History:   Diagnosis Date    Diabetes mellitus, type 2     Kidney stone     PONV (postoperative nausea and vomiting)      Past Surgical History:   Procedure Laterality Date    ANTEGRADE NEPHROSTOGRAPHY Right 12/11/2020    Procedure: NEPHROSTOGRAM, ANTEGRADE;  Surgeon: Yuko Hawkins MD;  Location: [facility];  Service: Urology;  Laterality: Right;    CYSTOSCOPY W/ URETERAL STENT PLACEMENT Right 11/24/2020    Procedure: CYSTOSCOPY, WITH URETERAL STENT INSERTION;   Surgeon: Yuko Hawkins MD;  Location: [facility] OR;  Service: Urology;  Laterality: Right;    CYSTOSCOPY W/ URETERAL STENT REMOVAL Right 12/23/2020    Procedure: CYSTOSCOPY, WITH URETERAL STENT REMOVAL;  Surgeon: Yuko Hawkins MD;  Location: [facility] OR;  Service: Urology;  Laterality: Right;    HYSTERECTOMY      OOPHORECTOMY      PERCUTANEOUS NEPHROLITHOTRIPSY Right 12/11/2020    Procedure: NEPHROLITHOTRIPSY, PERCUTANEOUS;  Surgeon: Yuko Hawkins MD;  Location: [facility] [place] OR;  Service: Urology;  Laterality: Right;    PERCUTANEOUS NEPHROSTOMY Right 12/10/2020    Procedure: Creation, Nephrostomy, Percutaneous;  Surgeon: Dosc Diagnostic Provider;  Location: [facility] [place] OR;  Service: General;  Laterality: Right;     Family History   Problem Relation Name Age of Onset    Heart failure Father      Breast cancer Sister      Hypertension Sister      Diabetes type II Sister      Heart failure Sister      COPD Maternal Grandmother      Diabetes type II Brother      Diabetes type II Brother         Marital Status: Single  Alcohol History:  reports no history of alcohol use.  Tobacco History:  reports that she has been smoking cigarettes. She has been exposed to tobacco smoke. She has never used smokeless tobacco.  Drug History:  reports no history of drug use.    Review of patient's allergies indicates:   Allergen Reactions    Metformin      Cold intolerance    Penicillins      I was young unsure of reaction         Current Outpatient Medications:     blood sugar diagnostic Strp, To check BG 2 times daily, to use with insurance preferred meter, Disp: 200 each, Rfl: 1    EScitalopram oxalate (LEXAPRO) 20 MG tablet, Take 1 tablet (20 mg total) by mouth once daily., Disp: 90 tablet, Rfl: 1    pravastatin (PRAVACHOL) 10 MG tablet, Take 1 tablet (10 mg total) by mouth once daily., Disp: 90 tablet, Rfl: 3    traMADoL (ULTRAM) 50 mg tablet, Take 1 tablet (50 mg total) by mouth every 6 (six) hours., Disp: 28 tablet, Rfl: 0    fluticasone  propionate (FLONASE) 50 mcg/actuation nasal spray, , Disp: , Rfl:     gabapentin (NEURONTIN) 100 MG capsule, Take 1 capsule (100 mg total) by mouth every evening., Disp: 90 capsule, Rfl: 1    HYDROcodone-acetaminophen (NORCO) 5-325 mg per tablet, Take 1 tablet by mouth every 6 (six) hours as needed for Pain. (Patient not taking: Reported on 4/30/2024), Disp: 16 tablet, Rfl: 0    lancets Misc, To check BG 2 times daily, to use with insurance preferred meter, Disp: 200 each, Rfl: 1    metFORMIN (GLUCOPHAGE-XR) 500 MG ER 24hr tablet, Take 1 tablet (500 mg total) by mouth 2 (two) times daily with meals. (Patient not taking: Reported on 4/30/2024), Disp: 180 tablet, Rfl: 1    sulfamethoxazole-trimethoprim 800-160mg (BACTRIM DS) 800-160 mg Tab, Take 1 tablet by mouth 2 (two) times daily. for 10 days, Disp: 20 tablet, Rfl: 0    tirzepatide (MOUNJARO) 2.5 mg/0.5 mL PnIj, Inject 2.5 mg into the skin every 7 days., Disp: 2 mL, Rfl: 2    traZODone (DESYREL) 50 MG tablet, Take 1 tablet (50 mg total) by mouth nightly as needed for Insomnia., Disp: 30 tablet, Rfl: 11    TRUE METRIX AIR GLUCOSE METER Misc, USE AS DIRECTED TO CHECK BLOOD GLUCOSE TWICE DAILY, Disp: , Rfl:     Review of Systems   Constitutional:  Negative for activity change and unexpected weight change.   HENT:  Negative for hearing loss, rhinorrhea and trouble swallowing.    Eyes:  Negative for discharge and visual disturbance.   Respiratory:  Negative for chest tightness and wheezing.    Cardiovascular:  Negative for chest pain and palpitations.   Gastrointestinal:  Positive for vomiting. Negative for blood in stool, constipation and diarrhea.   Endocrine: Positive for polydipsia and polyuria.   Genitourinary:  Positive for hematuria. Negative for difficulty urinating, dysuria and menstrual problem.   Musculoskeletal:  Negative for arthralgias, joint swelling and neck pain.   Neurological:  Negative for weakness and headaches.   Psychiatric/Behavioral:  Negative  "for confusion and dysphoric mood.           Objective:      Vitals:    04/30/24 0716   BP: 129/63   Pulse: 69   Resp: 18   SpO2: 97%   Weight: 94.3 kg (208 lb)   Height: 5' 9" (1.753 m)     Physical Exam  Constitutional:       Appearance: Normal appearance.   HENT:      Head: Normocephalic and atraumatic.   Eyes:      Conjunctiva/sclera: Conjunctivae normal.   Cardiovascular:      Rate and Rhythm: Normal rate and regular rhythm.   Pulmonary:      Effort: Pulmonary effort is normal. No respiratory distress.      Breath sounds: Normal breath sounds. No wheezing.   Abdominal:      General: There is no distension.      Palpations: There is no mass.      Tenderness: There is no abdominal tenderness.   Musculoskeletal:      Right lower leg: No edema.      Left lower leg: No edema.   Skin:     Findings: No erythema.      Comments: Right breast: abscess formed. Head, induration present, slight surrounding erythema   Neurological:      Mental Status: She is alert and oriented to person, place, and time.   Psychiatric:         Behavior: Behavior normal.           Assessment:       1. Type 2 diabetes mellitus with hyperglycemia, without long-term current use of insulin    2. Insomnia, unspecified type    3. Renal cyst    4. Family history of colon cancer    5. Abscess of breast         Plan:       Type 2 diabetes mellitus with hyperglycemia, without long-term current use of insulin  Comments:  A1c improved but intolerant of metformin. would like for her to start mounjaro now. has used in tyhe past with good efficacy  Orders:  -     gabapentin (NEURONTIN) 100 MG capsule; Take 1 capsule (100 mg total) by mouth every evening.  Dispense: 90 capsule; Refill: 1  -     tirzepatide (MOUNJARO) 2.5 mg/0.5 mL PnIj; Inject 2.5 mg into the skin every 7 days.  Dispense: 2 mL; Refill: 2    Insomnia, unspecified type  -     traZODone (DESYREL) 50 MG tablet; Take 1 tablet (50 mg total) by mouth nightly as needed for Insomnia.  Dispense: 30 " tablet; Refill: 11    Renal cyst  Comments:  benign findings on CT/us will continue to monitor    Family history of colon cancer  Comments:  another discussion regarding c-scope. Plan to complete this for me befor i see her back    Abscess of breast  Comments:  already has drained. little remaining inflammation. will trial with bactrim. continue applying the mupirocin cream. if not fully improved will call me  Orders:  -     sulfamethoxazole-trimethoprim 800-160mg (BACTRIM DS) 800-160 mg Tab; Take 1 tablet by mouth 2 (two) times daily. for 10 days  Dispense: 20 tablet; Refill: 0      Follow up in about 3 months (around 7/30/2024) for Diabetic Check-Up.        4/30/2024 Jeyson Portillo PA-C

## 2024-05-06 DIAGNOSIS — E11.65 TYPE 2 DIABETES MELLITUS WITH HYPERGLYCEMIA, WITHOUT LONG-TERM CURRENT USE OF INSULIN: ICD-10-CM

## 2024-05-06 RX ORDER — TIRZEPATIDE 2.5 MG/.5ML
2.5 INJECTION, SOLUTION SUBCUTANEOUS
Qty: 2 ML | Refills: 2 | OUTPATIENT
Start: 2024-05-06 | End: 2024-08-04

## 2024-05-06 RX ORDER — TIRZEPATIDE 2.5 MG/.5ML
2.5 INJECTION, SOLUTION SUBCUTANEOUS
Qty: 2 ML | Refills: 2 | Status: SHIPPED | OUTPATIENT
Start: 2024-05-06 | End: 2024-08-04

## 2024-05-06 NOTE — TELEPHONE ENCOUNTER
----- Message from Lorri Sanford sent at 5/6/2024 11:07 AM CDT -----  Refill for Mounjaro needs to be sent to [facility] in [facility]. Pt #[phone]

## 2024-06-26 ENCOUNTER — PATIENT MESSAGE (OUTPATIENT)
Dept: FAMILY MEDICINE | Facility: CLINIC | Age: 54
End: 2024-06-26
Payer: COMMERCIAL

## 2024-06-27 ENCOUNTER — OFFICE VISIT (OUTPATIENT)
Dept: URGENT CARE | Facility: CLINIC | Age: 54
End: 2024-06-27
Payer: COMMERCIAL

## 2024-06-27 ENCOUNTER — PATIENT MESSAGE (OUTPATIENT)
Dept: FAMILY MEDICINE | Facility: CLINIC | Age: 54
End: 2024-06-27
Payer: COMMERCIAL

## 2024-06-27 VITALS
WEIGHT: 200 LBS | HEIGHT: 69 IN | OXYGEN SATURATION: 99 % | HEART RATE: 75 BPM | RESPIRATION RATE: 16 BRPM | BODY MASS INDEX: 29.62 KG/M2 | TEMPERATURE: 98 F | SYSTOLIC BLOOD PRESSURE: 131 MMHG | DIASTOLIC BLOOD PRESSURE: 82 MMHG

## 2024-06-27 DIAGNOSIS — Z87.442 HISTORY OF NEPHROLITHIASIS: ICD-10-CM

## 2024-06-27 DIAGNOSIS — R31.9 HEMATURIA, UNSPECIFIED TYPE: ICD-10-CM

## 2024-06-27 DIAGNOSIS — R10.30 LOWER ABDOMINAL PAIN: Primary | ICD-10-CM

## 2024-06-27 DIAGNOSIS — Z87.19 HISTORY OF DIVERTICULOSIS: ICD-10-CM

## 2024-06-27 LAB
BILIRUB UR QL STRIP: NEGATIVE
GLUCOSE UR QL STRIP: NEGATIVE
KETONES UR QL STRIP: NEGATIVE
LEUKOCYTE ESTERASE UR QL STRIP: NEGATIVE
PH, POC UA: 6
POC BLOOD, URINE: POSITIVE
POC NITRATES, URINE: NEGATIVE
PROT UR QL STRIP: POSITIVE
SP GR UR STRIP: 1.02 (ref 1–1.03)
UROBILINOGEN UR STRIP-ACNC: 0.2 (ref 0.1–1.1)

## 2024-06-27 RX ORDER — PROMETHAZINE HYDROCHLORIDE 25 MG/1
25 TABLET ORAL EVERY 6 HOURS PRN
Qty: 20 TABLET | Refills: 0 | Status: SHIPPED | OUTPATIENT
Start: 2024-06-27

## 2024-06-27 RX ORDER — KETOROLAC TROMETHAMINE 10 MG/1
10 TABLET, FILM COATED ORAL EVERY 6 HOURS
Qty: 20 TABLET | Refills: 0 | Status: SHIPPED | OUTPATIENT
Start: 2024-06-28 | End: 2024-07-03

## 2024-06-27 RX ORDER — CIPROFLOXACIN 500 MG/1
500 TABLET ORAL EVERY 12 HOURS
Qty: 14 TABLET | Refills: 0 | Status: SHIPPED | OUTPATIENT
Start: 2024-06-27 | End: 2024-07-04

## 2024-06-27 RX ORDER — KETOROLAC TROMETHAMINE 30 MG/ML
60 INJECTION, SOLUTION INTRAMUSCULAR; INTRAVENOUS
Status: COMPLETED | OUTPATIENT
Start: 2024-06-27 | End: 2024-06-27

## 2024-06-27 RX ORDER — METRONIDAZOLE 500 MG/1
500 TABLET ORAL EVERY 8 HOURS
Qty: 21 TABLET | Refills: 0 | Status: SHIPPED | OUTPATIENT
Start: 2024-06-27 | End: 2024-07-04

## 2024-06-27 RX ADMIN — KETOROLAC TROMETHAMINE 60 MG: 30 INJECTION, SOLUTION INTRAMUSCULAR; INTRAVENOUS at 05:06

## 2024-06-27 NOTE — TELEPHONE ENCOUNTER
Now that symptomatic will be calling her urologist-if they can not get her in will go to urgent care

## 2024-06-27 NOTE — PROGRESS NOTES
"Subjective:      Patient ID: Tereza Weinstein is a 54 y.o. female.    Vitals:  height is 5' 9" (1.753 m) and weight is 90.7 kg (200 lb). Her temperature is 97.7 °F (36.5 °C). Her blood pressure is 131/82 and her pulse is 75. Her respiration is 16 and oxygen saturation is 99%.     Chief Complaint: Flank Pain    Patient is a 54-year-old female with a past medical history of type 2 diabetes, nephrolithiasis, thyroid disease and diverticulosis who presents to clinic for evaluation of flank pain and hematuria.  Patient reports symptoms x3 days now.  Patient reports no over-the-counter medications for symptoms at this point.  Patient reports history of kidney stones.  Patient states she believes she possibly has a another small kidney stone.  Patient states does not believe it is a large when which would need surgery.  Patient states she is leaving out of town and a few days and just wanted to come in for something for nausea then maybe something for pain.  Patient states that she has had prior ultrasounds and CTs which showed renal mass as well as diverticulosis and kidney stones.  Patient states that she has never had any diverticulitis flares.  Patient denies eating any abnormal foods recently which may have triggered symptoms.  Patient denies any seeds or peanut consumption.  Patient states that she has not experienced any vomiting, diarrhea, rectal bleeding, constipation, dysuria, urinary frequency or urgency, urinary hesitancy or retention, chest pain or shortness of breath, headaches or dizziness, fever or chills.    Flank Pain  This is a recurrent problem. Episode onset: x 3 days. The problem has been gradually worsening since onset. The quality of the pain is described as stabbing and shooting. Associated symptoms include abdominal pain (Lower abdominal pain), pelvic pain (Bladder pressure) and hematuria. Pertinent negatives include no bladder incontinence, chest pain, dysuria, fever or headaches. (Nausea  ) "       Constitution: Negative. Negative for chills, sweating, fatigue and fever.   HENT: Negative.     Neck: neck negative.   Cardiovascular: Negative.  Negative for chest pain and palpitations.   Eyes: Negative.    Respiratory: Negative.  Negative for chest tightness, cough and shortness of breath.    Gastrointestinal:  Positive for abdominal pain (Lower abdominal pain) and nausea. Negative for vomiting, constipation and diarrhea.   Endocrine: negative.   Genitourinary:  Positive for flank pain, hematuria, history of kidney stones and pelvic pain (Bladder pressure). Negative for dysuria, frequency, urgency, urine decreased, bladder incontinence, vaginal discharge and vaginal odor.   Musculoskeletal:  Negative for muscle ache.   Skin: Negative.  Negative for color change, pale, rash and erythema.   Allergic/Immunologic: Negative.    Neurological: Negative.  Negative for dizziness, light-headedness, passing out, headaches, disorientation and altered mental status.   Hematologic/Lymphatic: Negative.    Psychiatric/Behavioral: Negative.  Negative for altered mental status, disorientation and confusion.       Objective:     Physical Exam   Constitutional: She is oriented to person, place, and time. She appears well-developed. She is cooperative.  Non-toxic appearance. She does not appear ill. No distress.   HENT:   Head: Normocephalic and atraumatic.   Ears:   Right Ear: Hearing, tympanic membrane, external ear and ear canal normal.   Left Ear: Hearing, tympanic membrane, external ear and ear canal normal.   Nose: Nose normal. No mucosal edema or nasal deformity. No epistaxis. Right sinus exhibits no maxillary sinus tenderness and no frontal sinus tenderness. Left sinus exhibits no maxillary sinus tenderness and no frontal sinus tenderness.   Mouth/Throat: Uvula is midline, oropharynx is clear and moist and mucous membranes are normal. No trismus in the jaw. Normal dentition. No uvula swelling.   Eyes: Conjunctivae and  lids are normal. Pupils are equal, round, and reactive to light.   Neck: Trachea normal and phonation normal. Neck supple.   Cardiovascular: Normal rate, regular rhythm, normal heart sounds and normal pulses.   Pulmonary/Chest: Effort normal and breath sounds normal. No respiratory distress. She has no wheezes. She has no rhonchi. She has no rales.   Abdominal: Normal appearance and bowel sounds are normal. She exhibits no distension. Soft. There is abdominal tenderness in the right lower quadrant, suprapubic area and left lower quadrant. There is no rebound, no guarding, no left CVA tenderness and no right CVA tenderness.      Comments: Mild tenderness along the lower abdominal quadrants to deep palpation.   Musculoskeletal: Normal range of motion.         General: Normal range of motion.   Neurological: She is alert and oriented to person, place, and time. She exhibits normal muscle tone.   Skin: Skin is warm, dry, intact, not diaphoretic, not pale and no rash. Capillary refill takes less than 2 seconds. No erythema   Psychiatric: Her speech is normal and behavior is normal. Judgment and thought content normal.   Nursing note and vitals reviewed.      Assessment:     1. Lower abdominal pain    2. Hematuria, unspecified type    3. History of diverticulosis    4. History of nephrolithiasis        Plan:       Lower abdominal pain  -     POCT Urinalysis, Dipstick, Manual, W/O Scope    Hematuria, unspecified type    History of diverticulosis    History of nephrolithiasis    Other orders  -     ketorolac injection 60 mg  -     ciprofloxacin HCl (CIPRO) 500 MG tablet; Take 1 tablet (500 mg total) by mouth every 12 (twelve) hours. for 7 days  Dispense: 14 tablet; Refill: 0  -     metroNIDAZOLE (FLAGYL) 500 MG tablet; Take 1 tablet (500 mg total) by mouth every 8 (eight) hours. for 7 days  Dispense: 21 tablet; Refill: 0  -     ketorolac (TORADOL) 10 mg tablet; Take 1 tablet (10 mg total) by mouth every 6 (six) hours. for 5  days  Dispense: 20 tablet; Refill: 0  -     promethazine (PHENERGAN) 25 MG tablet; Take 1 tablet (25 mg total) by mouth every 6 (six) hours as needed for Nausea.  Dispense: 20 tablet; Refill: 0                Differential diagnosis includes that of urinary tract infection, nephrolithiasis, diverticulosis, diverticulitis, gastroenteritis  Labs: UA:  Positive blood, negative nitrate, negative leukocyte  Toradol 60 mg IM in clinic.  Patient tolerated well.  No complications noted.    Previous images to include CT renal, CT abdomen pelvis with and without contrast, and ultrasound retroperitoneal reviewed.    Patient refused need for additional imaging such as CT abdomen pelvis with contrast to rule out possibility of diverticulitis.    Take medications as prescribed.    No other NSAIDs while on Toradol; may rotate with Tylenol.    Assure adequate hydration.    Follow-up with PCP in 1-2 days.    Return to clinic as needed.    To ED for any continued, new or acutely worsening symptoms.    Patient in agreement with plan of care.    DISCLAIMER: Please note that my documentation in this Electronic Healthcare Record was produced using speech recognition software and therefore may contain errors related to that software system.These could include grammar, punctuation and spelling errors or the inclusion/exclusion of phrases that were not intended. Garbled syntax, mangled pronouns, and other bizarre constructions may be attributed to that software system.

## 2024-07-22 ENCOUNTER — PATIENT OUTREACH (OUTPATIENT)
Dept: ADMINISTRATIVE | Facility: HOSPITAL | Age: 54
End: 2024-07-22
Payer: COMMERCIAL

## 2024-07-22 NOTE — PROGRESS NOTES
Population Health Chart Review & Patient Outreach Details      Additional [facility] Notes:    Working [employer] report. Called regarding overdue HM. Left voice message.           Updates Requested / Reviewed:      Updated Care Coordination Note, Care Everywhere, , External Sources: [facility], [facility], and [facility], and Immunizations Reconciliation Completed or Queried: [facility] Topics Overdue:      VBHM Score: 3     Colon Cancer Screening  Urine Screening  Eye Exam                       Health Maintenance Topic(s) Outreach Outcomes & Actions Taken:    Colorectal Cancer Screening - Outreach Outcomes & Actions Taken  : Reminder pt portal message sent.    Eye Exam - Outreach Outcomes & Actions Taken  : Reminder pt portal message sent.    Lab(s) - Outreach Outcomes & Actions Taken  : Reminder pt portal message sent.    Diabetic Foot Exam - Outreach Outcomes & Actions Taken  : Reminder added to appt notes.

## 2024-08-01 ENCOUNTER — OFFICE VISIT (OUTPATIENT)
Dept: FAMILY MEDICINE | Facility: CLINIC | Age: 54
End: 2024-08-01
Payer: COMMERCIAL

## 2024-08-01 VITALS
WEIGHT: 203 LBS | HEIGHT: 69 IN | SYSTOLIC BLOOD PRESSURE: 122 MMHG | HEART RATE: 77 BPM | BODY MASS INDEX: 30.07 KG/M2 | OXYGEN SATURATION: 95 % | RESPIRATION RATE: 18 BRPM | DIASTOLIC BLOOD PRESSURE: 66 MMHG

## 2024-08-01 DIAGNOSIS — R07.9 CHEST PAIN, UNSPECIFIED TYPE: Primary | ICD-10-CM

## 2024-08-01 DIAGNOSIS — G47.00 INSOMNIA, UNSPECIFIED TYPE: ICD-10-CM

## 2024-08-01 DIAGNOSIS — N20.0 NEPHROLITHIASIS: ICD-10-CM

## 2024-08-01 DIAGNOSIS — F32.A ANXIETY AND DEPRESSION: ICD-10-CM

## 2024-08-01 DIAGNOSIS — R31.9 HEMATURIA, UNSPECIFIED TYPE: ICD-10-CM

## 2024-08-01 DIAGNOSIS — E11.65 TYPE 2 DIABETES MELLITUS WITH HYPERGLYCEMIA, WITHOUT LONG-TERM CURRENT USE OF INSULIN: ICD-10-CM

## 2024-08-01 DIAGNOSIS — F41.9 ANXIETY AND DEPRESSION: ICD-10-CM

## 2024-08-01 LAB
EKG 12-LEAD: NORMAL
PR INTERVAL: NORMAL
PRT AXES: NORMAL
QRS DURATION: NORMAL
QT/QTC: NORMAL
VENTRICULAR RATE: NORMAL

## 2024-08-01 RX ORDER — TRAMADOL HYDROCHLORIDE 50 MG/1
50 TABLET ORAL EVERY 6 HOURS
Qty: 28 TABLET | Refills: 0 | Status: SHIPPED | OUTPATIENT
Start: 2024-08-01

## 2024-08-01 RX ORDER — TRAZODONE HYDROCHLORIDE 50 MG/1
50 TABLET ORAL NIGHTLY PRN
Qty: 30 TABLET | Refills: 11 | Status: SHIPPED | OUTPATIENT
Start: 2024-08-01 | End: 2025-08-01

## 2024-08-01 RX ORDER — GLIPIZIDE 5 MG/1
10 TABLET ORAL 2 TIMES DAILY WITH MEALS
Qty: 360 TABLET | Refills: 1 | Status: SHIPPED | OUTPATIENT
Start: 2024-08-01 | End: 2025-01-28

## 2024-08-01 RX ORDER — GABAPENTIN 100 MG/1
100 CAPSULE ORAL NIGHTLY
Qty: 90 CAPSULE | Refills: 1 | Status: SHIPPED | OUTPATIENT
Start: 2024-08-01 | End: 2025-01-28

## 2024-08-01 RX ORDER — ESCITALOPRAM OXALATE 20 MG/1
20 TABLET ORAL DAILY
Qty: 90 TABLET | Refills: 1 | Status: SHIPPED | OUTPATIENT
Start: 2024-08-01

## 2024-08-01 RX ORDER — PRAVASTATIN SODIUM 10 MG/1
10 TABLET ORAL DAILY
Qty: 90 TABLET | Refills: 3 | Status: SHIPPED | OUTPATIENT
Start: 2024-08-01 | End: 2025-08-01

## 2024-08-01 NOTE — PROGRESS NOTES
SUBJECTIVE:    Patient ID: Tereza Weinstein is a 54 y.o. female.    Chief Complaint: Follow-up (Brought bottles// refills needed// pt states she's been having constant chest pains left side states when she drinks coffee its worse going on for 1 month // pt refused eye exam and Colorectal Cancer Screening)    54-year-old female presents today for regular diabetic checkup. A1c today is 7.6% and she reports that she has been off of all diabetic meds. Could not tolerate metformin and then has not been able to get mounjaro filled. Open to starting back on glipizide alone.    She brings up some left sided chest pain now. Constantly feels it. Dull ache. Present at least for a month or so. Finds that she is unable to tolerate coffee later in the day like she used to.    Follow-up  Pertinent negatives include no abdominal pain, arthralgias, chest pain, coughing, fever, headaches or myalgias.       Office Visit on 06/27/2024   Component Date Value Ref Range Status    POC Blood, Urine 06/27/2024 Positive (A)  Negative Final    POC Bilirubin, Urine 06/27/2024 Negative  Negative Final    POC Urobilinogen, Urine 06/27/2024 0.2  0.1 - 1.1 Final    POC Ketones, Urine 06/27/2024 Negative  Negative Final    POC Protein, Urine 06/27/2024 Positive (A)  Negative Final    POC Nitrates, Urine 06/27/2024 Negative  Negative Final    POC Glucose, Urine 06/27/2024 Negative  Negative Final    pH, UA 06/27/2024 6.0   Final    POC Specific Gravity, Urine 06/27/2024 1.020  1.003 - 1.029 Final    POC Leukocytes, Urine 06/27/2024 Negative  Negative Final   Lab Visit on 04/22/2024   Component Date Value Ref Range Status    Specimen UA 04/22/2024 Urine, Clean Catch   Final    Color, UA 04/22/2024 Yellow  Yellow, Straw, Lian Final    Appearance, UA 04/22/2024 Clear  Clear Final    pH, UA 04/22/2024 6.0  5.0 - 8.0 Final    Specific Gravity, UA 04/22/2024 1.015  1.005 - 1.030 Final    Protein, UA 04/22/2024 Negative  Negative Final     Glucose, UA 04/22/2024 Negative  Negative Final    Ketones, UA 04/22/2024 Negative  Negative Final    Bilirubin (UA) 04/22/2024 Negative  Negative Final    Occult Blood UA 04/22/2024 Negative  Negative Final    Nitrite, UA 04/22/2024 Negative  Negative Final    Urobilinogen, UA 04/22/2024 Negative  <2.0 EU/dL Final    Leukocytes, UA 04/22/2024 Negative  Negative Final    TSH 04/22/2024 4.718 (H)  0.400 - 4.000 uIU/mL Final    Sodium 04/22/2024 139  136 - 145 mmol/L Final    Potassium 04/22/2024 4.1  3.5 - 5.1 mmol/L Final    Chloride 04/22/2024 104  95 - 110 mmol/L Final    CO2 04/22/2024 24  23 - 29 mmol/L Final    Glucose 04/22/2024 158 (H)  70 - 110 mg/dL Final    BUN 04/22/2024 8  6 - 20 mg/dL Final    Creatinine 04/22/2024 0.8  0.5 - 1.4 mg/dL Final    Calcium 04/22/2024 9.9  8.7 - 10.5 mg/dL Final    Total Protein 04/22/2024 6.5  6.0 - 8.4 g/dL Final    Albumin 04/22/2024 3.7  3.5 - 5.2 g/dL Final    Total Bilirubin 04/22/2024 0.5  0.1 - 1.0 mg/dL Final    Alkaline Phosphatase 04/22/2024 104  55 - 135 U/L Final    AST 04/22/2024 12  10 - 40 U/L Final    ALT 04/22/2024 18  10 - 44 U/L Final    eGFR 04/22/2024 >60  >60 mL/min/1.73 m^2 Final    Anion Gap 04/22/2024 11  8 - 16 mmol/L Final    Cholesterol 04/22/2024 184  120 - 199 mg/dL Final    Triglycerides 04/22/2024 68  30 - 150 mg/dL Final    HDL 04/22/2024 56  40 - 75 mg/dL Final    LDL Cholesterol 04/22/2024 114.4  63.0 - 159.0 mg/dL Final    HDL/Cholesterol Ratio 04/22/2024 30.4  20.0 - 50.0 % Final    Total Cholesterol/HDL Ratio 04/22/2024 3.3  2.0 - 5.0 Final    Non-HDL Cholesterol 04/22/2024 128  mg/dL Final    WBC 04/22/2024 8.61  3.90 - 12.70 K/uL Final    RBC 04/22/2024 4.53  4.00 - 5.40 M/uL Final    Hemoglobin 04/22/2024 15.3  12.0 - 16.0 g/dL Final    Hematocrit 04/22/2024 45.2  37.0 - 48.5 % Final    MCV 04/22/2024 100 (H)  82 - 98 fL Final    MCH 04/22/2024 33.8 (H)  27.0 - 31.0 pg Final    MCHC 04/22/2024  33.8  32.0 - 36.0 g/dL Final    RDW 04/22/2024 12.1  11.5 - 14.5 % Final    Platelets 04/22/2024 249  150 - 450 K/uL Final    MPV 04/22/2024 9.6  9.2 - 12.9 fL Final    Immature Granulocytes 04/22/2024 0.2  0.0 - 0.5 % Final    Gran # (ANC) 04/22/2024 4.6  1.8 - 7.7 K/uL Final    Immature Grans (Abs) 04/22/2024 0.02  0.00 - 0.04 K/uL Final    Lymph # 04/22/2024 2.9  1.0 - 4.8 K/uL Final    Mono # 04/22/2024 0.9  0.3 - 1.0 K/uL Final    Eos # 04/22/2024 0.1  0.0 - 0.5 K/uL Final    Baso # 04/22/2024 0.03  0.00 - 0.20 K/uL Final    nRBC 04/22/2024 0  0 /100 WBC Final    Gran % 04/22/2024 53.4  38.0 - 73.0 % Final    Lymph % 04/22/2024 33.9  18.0 - 48.0 % Final    Mono % 04/22/2024 10.6  4.0 - 15.0 % Final    Eosinophil % 04/22/2024 1.6  0.0 - 8.0 % Final    Basophil % 04/22/2024 0.3  0.0 - 1.9 % Final    Differential Method 04/22/2024 Automated   Final    Hemoglobin A1C 04/22/2024 6.9 (H)  4.5 - 6.2 % Final    Estimated Avg Glucose 04/22/2024 151 (H)  68 - 131 mg/dL Final    Free T4 04/22/2024 0.91  0.71 - 1.51 ng/dL Final   Hospital Outpatient Visit on 04/19/2024   Component Date Value Ref Range Status    POC Creatinine 04/19/2024 0.8  0.5 - 1.4 mg/dL Final    Sample 04/19/2024 unknown   Final   Office Visit on 04/11/2024   Component Date Value Ref Range Status    POC Blood, Urine 04/11/2024 Positive (A)  Negative Final    POC Bilirubin, Urine 04/11/2024 Negative  Negative Final    POC Urobilinogen, Urine 04/11/2024 norm  0.1 - 1.1 Final    POC Ketones, Urine 04/11/2024 Negative  Negative Final    POC Protein, Urine 04/11/2024 Negative  Negative Final    POC Nitrates, Urine 04/11/2024 Negative  Negative Final    POC Glucose, Urine 04/11/2024 Negative  Negative Final    pH, UA 04/11/2024 6.0   Final    POC Specific Gravity, Urine 04/11/2024 1.020  1.003 - 1.029 Final    POC Leukocytes, Urine 04/11/2024 Positive (A)  Negative Final    Urine Culture, Routine 04/11/2024 Final report    Final    Result 1 04/11/2024 No growth   Final       Past Medical History:   Diagnosis Date    Diabetes mellitus, type 2     Kidney stone     PONV (postoperative nausea and vomiting)      Past Surgical History:   Procedure Laterality Date    ANTEGRADE NEPHROSTOGRAPHY Right 12/11/2020    Procedure: NEPHROSTOGRAM, ANTEGRADE;  Surgeon: Yuko Hawkins MD;  Location: [facility];  Service: Urology;  Laterality: Right;    CYSTOSCOPY W/ URETERAL STENT PLACEMENT Right 11/24/2020    Procedure: CYSTOSCOPY, WITH URETERAL STENT INSERTION;  Surgeon: Yuko Hawkins MD;  Location: [facility] OR;  Service: Urology;  Laterality: Right;    CYSTOSCOPY W/ URETERAL STENT REMOVAL Right 12/23/2020    Procedure: CYSTOSCOPY, WITH URETERAL STENT REMOVAL;  Surgeon: Yuko Hawkins MD;  Location: [facility] OR;  Service: Urology;  Laterality: Right;    HYSTERECTOMY      OOPHORECTOMY      PERCUTANEOUS NEPHROLITHOTRIPSY Right 12/11/2020    Procedure: NEPHROLITHOTRIPSY, PERCUTANEOUS;  Surgeon: Yuko Hawkins MD;  Location: [facility];  Service: Urology;  Laterality: Right;    PERCUTANEOUS NEPHROSTOMY Right 12/10/2020    Procedure: Creation, Nephrostomy, Percutaneous;  Surgeon: Raulito Diagnostic Provider;  Location: [facility];  Service: General;  Laterality: Right;     Family History   Problem Relation Name Age of Onset    Heart failure Father      Breast cancer Sister      Hypertension Sister      Diabetes type II Sister      Heart failure Sister      COPD Maternal Grandmother      Diabetes type II Brother      Diabetes type II Brother         Marital Status: Single  Alcohol History:  reports no history of alcohol use.  Tobacco History:  reports that she has been smoking cigarettes. She has been exposed to tobacco smoke. She has never used smokeless tobacco.  Drug History:  reports no history of drug use.    Review of patient's allergies indicates:   Allergen Reactions    Metformin      Cold intolerance    Penicillins      I was  young unsure of reaction         Current Outpatient Medications:     blood sugar diagnostic Strp, To check BG 2 times daily, to use with insurance preferred meter, Disp: 200 each, Rfl: 1    lancets Misc, To check BG 2 times daily, to use with insurance preferred meter, Disp: 200 each, Rfl: 1    promethazine (PHENERGAN) 25 MG tablet, Take 1 tablet (25 mg total) by mouth every 6 (six) hours as needed for Nausea., Disp: 20 tablet, Rfl: 0    TRUE METRIX AIR GLUCOSE METER [facility], USE AS DIRECTED TO CHECK BLOOD GLUCOSE TWICE DAILY, Disp: , Rfl:     EScitalopram oxalate (LEXAPRO) 20 MG tablet, Take 1 tablet (20 mg total) by mouth once daily., Disp: 90 tablet, Rfl: 1    gabapentin (NEURONTIN) 100 MG capsule, Take 1 capsule (100 mg total) by mouth every evening., Disp: 90 capsule, Rfl: 1    glipiZIDE (GLUCOTROL) 5 MG tablet, Take 2 tablets (10 mg total) by mouth 2 (two) times daily with meals., Disp: 360 tablet, Rfl: 1    pravastatin (PRAVACHOL) 10 MG tablet, Take 1 tablet (10 mg total) by mouth once daily., Disp: 90 tablet, Rfl: 3    tirzepatide (MOUNJARO) 2.5 mg/0.5 mL PnIj, Inject 2.5 mg into the skin every 7 days. (Patient not taking: Reported on 8/1/2024), Disp: 2 mL, Rfl: 2    traMADoL (ULTRAM) 50 mg tablet, Take 1 tablet (50 mg total) by mouth every 6 (six) hours., Disp: 28 tablet, Rfl: 0    traZODone (DESYREL) 50 MG tablet, Take 1 tablet (50 mg total) by mouth nightly as needed for Insomnia., Disp: 30 tablet, Rfl: 11    Review of Systems   Constitutional:  Negative for activity change, appetite change and fever.   HENT:  Negative for postnasal drip, rhinorrhea and sinus pressure.    Eyes:  Negative for visual disturbance.   Respiratory:  Negative for cough and shortness of breath.    Cardiovascular:  Negative for chest pain.   Gastrointestinal:  Negative for abdominal distention and abdominal pain.   Genitourinary:  Negative for difficulty urinating and dysuria.   Musculoskeletal:  Negative for arthralgias  "and myalgias.   Neurological:  Negative for headaches.   Hematological:  Negative for adenopathy.   Psychiatric/Behavioral:  The patient is nervous/anxious.           Objective:      Vitals:    08/01/24 1558   BP: 122/66   Pulse: 77   Resp: 18   SpO2: 95%   Weight: 92.1 kg (203 lb)   Height: 5' 9" (1.753 m)     Physical Exam  Constitutional:       Appearance: Normal appearance.   HENT:      Head: Normocephalic and atraumatic.   Eyes:      Conjunctiva/sclera: Conjunctivae normal.   Cardiovascular:      Rate and Rhythm: Normal rate and regular rhythm.   Pulmonary:      Effort: Pulmonary effort is normal. No respiratory distress.      Breath sounds: Normal breath sounds. No wheezing.   Abdominal:      General: There is no distension.      Palpations: There is no mass.      Tenderness: There is no abdominal tenderness.   Musculoskeletal:      Right lower leg: No edema.      Left lower leg: No edema.   Skin:     Findings: No erythema.   Neurological:      Mental Status: She is alert and oriented to person, place, and time.   Psychiatric:         Behavior: Behavior normal.         Assessment:       1. Chest pain, unspecified type    2. Type 2 diabetes mellitus with hyperglycemia, without long-term current use of insulin    3. Insomnia, unspecified type    4. Anxiety and depression    5. Nephrolithiasis    6. Hematuria, unspecified type         Plan:       Chest pain, unspecified type  Comments:  unclear etiology. ekg in clinic NSR. given hx of DM would like to get stress test completed. may consider anxiety as well.  Orders:  -     Exercise Stress - EKG; Future  -     POCT EKG 12-LEAD (Manually Resulted by Ordering Provider)    Type 2 diabetes mellitus with hyperglycemia, without long-term current use of insulin  Comments:  A1c up to 7.6% and off all meds. diet alone. add glipizide 5mg BID followup in clinic in 3 mos for recheck on a1c  Orders:  -     POCT HEMOGLOBIN A1C  -     pravastatin (PRAVACHOL) 10 MG tablet; Take 1 " tablet (10 mg total) by mouth once daily.  Dispense: 90 tablet; Refill: 3  -     gabapentin (NEURONTIN) 100 MG capsule; Take 1 capsule (100 mg total) by mouth every evening.  Dispense: 90 capsule; Refill: 1  -     glipiZIDE (GLUCOTROL) 5 MG tablet; Take 2 tablets (10 mg total) by mouth 2 (two) times daily with meals.  Dispense: 360 tablet; Refill: 1    Insomnia, unspecified type  Comments:  refills as needed.  Orders:  -     traZODone (DESYREL) 50 MG tablet; Take 1 tablet (50 mg total) by mouth nightly as needed for Insomnia.  Dispense: 30 tablet; Refill: 11    Anxiety and depression  Comments:  mood and anxiety is stable.  Orders:  -     EScitalopram oxalate (LEXAPRO) 20 MG tablet; Take 1 tablet (20 mg total) by mouth once daily.  Dispense: 90 tablet; Refill: 1    Nephrolithiasis  -     traMADoL (ULTRAM) 50 mg tablet; Take 1 tablet (50 mg total) by mouth every 6 (six) hours.  Dispense: 28 tablet; Refill: 0    Hematuria, unspecified type  Comments:  recheck u/a now test for cure.  Orders:  -     Urinalysis, Reflex to Urine Culture Urine, Clean Catch; Future; Expected date: 08/01/2024      Follow up in about 3 months (around 11/1/2024) for Diabetic Check-Up.        8/1/2024 Jeyson Portillo PA-C

## 2024-08-02 LAB — HBA1C MFR BLD: 7.6 %

## 2024-08-03 LAB
APPEARANCE UR: CLEAR
BACTERIA #/AREA URNS HPF: NORMAL /HPF
BACTERIA UR CULT: NORMAL
BILIRUB UR QL STRIP: NEGATIVE
COLOR UR: YELLOW
GLUCOSE UR QL STRIP: NEGATIVE
HGB UR QL STRIP: NEGATIVE
HYALINE CASTS #/AREA URNS LPF: NORMAL /LPF
KETONES UR QL STRIP: NEGATIVE
LEUKOCYTE ESTERASE UR QL STRIP: NEGATIVE
NITRITE UR QL STRIP: NEGATIVE
PH UR STRIP: 5.5 [PH] (ref 5–8)
PROT UR QL STRIP: NEGATIVE
RBC #/AREA URNS HPF: NORMAL /HPF
SERVICE CMNT-IMP: NORMAL
SP GR UR STRIP: 1.01 (ref 1–1.03)
SQUAMOUS #/AREA URNS HPF: NORMAL /HPF
WBC #/AREA URNS HPF: NORMAL /HPF

## 2024-08-15 ENCOUNTER — TELEPHONE (OUTPATIENT)
Dept: CARDIOLOGY | Facility: HOSPITAL | Age: 54
End: 2024-08-15

## 2024-08-15 NOTE — TELEPHONE ENCOUNTER
Patient advised, test will be at [facility] ([address] [facility]).   Will need to register on the first floor at the main entrance.   Patient advised that arrival time is 8:30am.  Patient advised, may take her medications prior to testing if you need to.    Advised if she needs to eat to take her medications, please keep it light, like toast and juice.    Patient advised to avoid all caffeine 12 hours prior to testing.  This includes decaf tea and coffee.    Wear comfortable clothing.   No lotions, oils, or powders to the upper chest area. May wear deodorant.    Patient verbalizes understanding of instructions.

## 2024-08-16 ENCOUNTER — HOSPITAL ENCOUNTER (OUTPATIENT)
Dept: CARDIOLOGY | Facility: HOSPITAL | Age: 54
Discharge: HOME | End: 2024-08-16
Attending: PHYSICIAN ASSISTANT
Payer: COMMERCIAL

## 2024-08-16 DIAGNOSIS — R07.9 CHEST PAIN, UNSPECIFIED TYPE: ICD-10-CM

## 2024-08-16 LAB
CV STRESS BASE HR: 66 BPM
DIASTOLIC BLOOD PRESSURE: 80 MMHG
OHS CV CPX 1 MINUTE RECOVERY HEART RATE: 116 BPM
OHS CV CPX 85 PERCENT MAX PREDICTED HEART RATE MALE: 139
OHS CV CPX ESTIMATED METS: 7
OHS CV CPX MAX PREDICTED HEART RATE: 164
OHS CV CPX PATIENT IS FEMALE: 1
OHS CV CPX PATIENT IS MALE: 0
OHS CV CPX PEAK DIASTOLIC BLOOD PRESSURE: 94 MMHG
OHS CV CPX PEAK HEAR RATE: 148 BPM
OHS CV CPX PEAK RATE PRESSURE PRODUCT: NORMAL
OHS CV CPX PEAK SYSTOLIC BLOOD PRESSURE: 196 MMHG
OHS CV CPX PERCENT MAX PREDICTED HEART RATE ACHIEVED: 94
OHS CV CPX RATE PRESSURE PRODUCT PRESENTING: 8448
STRESS ECHO POST EXERCISE DUR MIN: 5 MINUTES
STRESS ECHO POST EXERCISE DUR SEC: 39 SECONDS
SYSTOLIC BLOOD PRESSURE: 128 MMHG

## 2024-08-16 PROCEDURE — 93018 CV STRESS TEST I&R ONLY: CPT | Mod: ,,, | Performed by: INTERNAL MEDICINE

## 2024-08-16 PROCEDURE — 93017 CV STRESS TEST TRACING ONLY: CPT

## 2024-10-01 ENCOUNTER — PATIENT MESSAGE (OUTPATIENT)
Dept: ADMINISTRATIVE | Facility: HOSPITAL | Age: 54
End: 2024-10-01
Payer: COMMERCIAL

## 2024-10-15 ENCOUNTER — PATIENT MESSAGE (OUTPATIENT)
Dept: FAMILY MEDICINE | Facility: CLINIC | Age: 54
End: 2024-10-15

## 2024-10-23 ENCOUNTER — TELEPHONE (OUTPATIENT)
Dept: FAMILY MEDICINE | Facility: CLINIC | Age: 54
End: 2024-10-23

## 2024-10-23 DIAGNOSIS — E11.65 TYPE 2 DIABETES MELLITUS WITH HYPERGLYCEMIA, WITHOUT LONG-TERM CURRENT USE OF INSULIN: Primary | ICD-10-CM

## 2024-10-28 DIAGNOSIS — E11.65 TYPE 2 DIABETES MELLITUS WITH HYPERGLYCEMIA, WITHOUT LONG-TERM CURRENT USE OF INSULIN: Primary | ICD-10-CM

## 2024-10-28 RX ORDER — TIRZEPATIDE 5 MG/.5ML
5 INJECTION, SOLUTION SUBCUTANEOUS
Qty: 4 ML | Refills: 1 | Status: SHIPPED | OUTPATIENT
Start: 2024-10-28

## 2024-11-11 ENCOUNTER — OFFICE VISIT (OUTPATIENT)
Dept: FAMILY MEDICINE | Facility: CLINIC | Age: 54
End: 2024-11-11
Payer: COMMERCIAL

## 2024-11-11 VITALS
OXYGEN SATURATION: 96 % | HEIGHT: 69 IN | DIASTOLIC BLOOD PRESSURE: 60 MMHG | SYSTOLIC BLOOD PRESSURE: 122 MMHG | WEIGHT: 198 LBS | HEART RATE: 70 BPM | BODY MASS INDEX: 29.33 KG/M2 | RESPIRATION RATE: 18 BRPM

## 2024-11-11 DIAGNOSIS — F32.A ANXIETY AND DEPRESSION: ICD-10-CM

## 2024-11-11 DIAGNOSIS — G47.00 INSOMNIA, UNSPECIFIED TYPE: Primary | ICD-10-CM

## 2024-11-11 DIAGNOSIS — E11.65 TYPE 2 DIABETES MELLITUS WITH HYPERGLYCEMIA, WITHOUT LONG-TERM CURRENT USE OF INSULIN: ICD-10-CM

## 2024-11-11 DIAGNOSIS — F41.9 ANXIETY AND DEPRESSION: ICD-10-CM

## 2024-11-11 LAB — HBA1C MFR BLD: 7.1 %

## 2024-11-11 PROCEDURE — 3051F HG A1C>EQUAL 7.0%<8.0%: CPT | Mod: CPTII,S$GLB,, | Performed by: PHYSICIAN ASSISTANT

## 2024-11-11 PROCEDURE — 3074F SYST BP LT 130 MM HG: CPT | Mod: CPTII,S$GLB,, | Performed by: PHYSICIAN ASSISTANT

## 2024-11-11 PROCEDURE — 3078F DIAST BP <80 MM HG: CPT | Mod: CPTII,S$GLB,, | Performed by: PHYSICIAN ASSISTANT

## 2024-11-11 PROCEDURE — 83036 HEMOGLOBIN GLYCOSYLATED A1C: CPT | Mod: QW,,, | Performed by: PHYSICIAN ASSISTANT

## 2024-11-11 PROCEDURE — 3008F BODY MASS INDEX DOCD: CPT | Mod: CPTII,S$GLB,, | Performed by: PHYSICIAN ASSISTANT

## 2024-11-11 PROCEDURE — 99214 OFFICE O/P EST MOD 30 MIN: CPT | Mod: S$GLB,,, | Performed by: PHYSICIAN ASSISTANT

## 2024-11-11 PROCEDURE — 1159F MED LIST DOCD IN RCRD: CPT | Mod: CPTII,S$GLB,, | Performed by: PHYSICIAN ASSISTANT

## 2024-11-11 RX ORDER — TIRZEPATIDE 5 MG/.5ML
5 INJECTION, SOLUTION SUBCUTANEOUS
Qty: 4 ML | Refills: 1 | Status: SHIPPED | OUTPATIENT
Start: 2024-11-11

## 2024-11-11 NOTE — PROGRESS NOTES
SUBJECTIVE:    Patient ID: Tereza Weinstein is a 54 y.o. female.    Chief Complaint: Follow-up (Brought bottles// no refills needed//pt states when she takes Glipizide she feels nausea, vomiting  and diarrhea she stopped taking //also having bilateral leg cramps going on for 2 weeks // pt is concerned about her skin bruising easily //pt refused flu vaccine,Colorectal Cancer Screening and mammogram //agrees to foot exam )    History of Present Illness    CHIEF COMPLAINT:  Tereza presents today for follow-up on diabetes management.    DIABETES MANAGEMENT:  She reports improvement in A1C from 7.6 to 7.1. She experienced significant side effects from Glipizide, including stomach issues and diarrhea, which lasted for two days after taking one dose. She mentions previous experience with GLP-1 medications, noting that the worst side effect was constipation. She expresses willingness to try Mounjaro or alternative medications like Januvia if insurance coverage issues persist.    PERIPHERAL NEUROPATHY:  She reports experiencing leg cramping from the knees down for the past couple of weeks. She also describes occasional numbness and tingling in her feet, primarily affecting her toes. She denies any persistent sensation issues in her legs.    DERMATOLOGICAL CONCERNS:  She reports easy bruising, describing it as superficial and occurring without any apparent cause. She notes that the bruising appears just beneath the skin, consistent with senile purpura.    CURRENT MEDICATIONS:  She is currently taking heart medication, sleep medication, back pain medication, anxiety medication, and Lexapro. She uses ibuprofen for pain management when needed, which she states helps alleviate symptoms. She mentions experiencing leg aches at night.    INSURANCE:  She reports recent insurance changes. Her insurance initially denied coverage for Ozempic, citing lack of proof of diabetes diagnosis. She expresses concern about medication coverage  due to these insurance changes.      ROS:  General: -fever, -chills, -fatigue, -weight gain, -weight loss  Eyes: -vision changes, -redness, -discharge  ENT: -ear pain, -nasal congestion, -sore throat  Cardiovascular: -chest pain, -palpitations, -lower extremity edema  Respiratory: -cough, -shortness of breath  Gastrointestinal: -abdominal pain, -nausea, -vomiting, +diarrhea, +constipation, -blood in stool  Genitourinary: -dysuria, -hematuria, -frequency  Musculoskeletal: -joint pain, -muscle pain, +muscle cramps  Skin: -rash, -lesion  Neurological: -headache, -dizziness, +numbness, -tingling  Psychiatric: -anxiety, -depression, -sleep difficulty         Hospital Outpatient Visit on 08/16/2024   Component Date Value Ref Range Status    85% Max Predicted HR 08/16/2024 139   Final    Max Predicted HR 08/16/2024 164   Final    [facility] CV CPX PATIENT IS MALE 08/16/2024 0.0   Final    [facility] CV CPX PATIENT IS FEMALE 08/16/2024 1.0   Final    HR at rest 08/16/2024 66  bpm Final    Systolic blood pressure 08/16/2024 128  mmHg Final    Diastolic blood pressure 08/16/2024 80  mmHg Final    RPP 08/16/2024 8,448   Final    Exercise duration (min) 08/16/2024 5  minutes Final    Exercise duration (sec) 08/16/2024 39  seconds Final    Peak HR 08/16/2024 148  bpm Final    Peak Systolic BP 08/16/2024 196  mmHg Final    Peak Diatolic BP 08/16/2024 94  mmHg Final    Peak RPP 08/16/2024 29,008   Final    Estimated METs 08/16/2024 7   Final    % Max HR Achieved 08/16/2024 94   Final    1 Minute Recovery HR 08/16/2024 116  bpm Final   Office Visit on 08/01/2024   Component Date Value Ref Range Status    Hemoglobin A1C, POC 08/02/2024 7.6  % Final    Color, UA 08/02/2024 YELLOW  YELLOW Final    Appearance, UA 08/02/2024 CLEAR  CLEAR Final    Specific Gravity, UA 08/02/2024 1.010  1.001 - 1.035 Final    pH, UA 08/02/2024 5.5  5.0 - 8.0 Final    Glucose, UA 08/02/2024 NEGATIVE  NEGATIVE Final    Bilirubin, UA 08/02/2024 NEGATIVE  NEGATIVE Final     Ketones, UA 08/02/2024 NEGATIVE  NEGATIVE Final    Occult Blood UA 08/02/2024 NEGATIVE  NEGATIVE Final    Protein, UA 08/02/2024 NEGATIVE  NEGATIVE Final    Nitrite, UA 08/02/2024 NEGATIVE  NEGATIVE Final    Leukocytes, UA 08/02/2024 NEGATIVE  NEGATIVE Final    WBC Casts, UA 08/02/2024 NONE SEEN  < OR = 5 /HPF Final    RBC Casts, UA 08/02/2024 NONE SEEN  < OR = 2 /HPF Final    Squam Epithel, UA 08/02/2024 NONE SEEN  < OR = 5 /HPF Final    Bacteria, UA 08/02/2024 NONE SEEN  NONE SEEN /HPF Final    Hyaline Casts, UA 08/02/2024 NONE SEEN  NONE SEEN /LPF Final    Service Cmt: 08/02/2024 SEE COMMENT   Final    Reflexive Urine Culture 08/02/2024 SEE COMMENT   Final   Office Visit on 06/27/2024   Component Date Value Ref Range Status    POC Blood, Urine 06/27/2024 Positive (A)  Negative Final    POC Bilirubin, Urine 06/27/2024 Negative  Negative Final    POC Urobilinogen, Urine 06/27/2024 0.2  0.1 - 1.1 Final    POC Ketones, Urine 06/27/2024 Negative  Negative Final    POC Protein, Urine 06/27/2024 Positive (A)  Negative Final    POC Nitrates, Urine 06/27/2024 Negative  Negative Final    POC Glucose, Urine 06/27/2024 Negative  Negative Final    pH, UA 06/27/2024 6.0   Final    POC Specific Gravity, Urine 06/27/2024 1.020  1.003 - 1.029 Final    POC Leukocytes, Urine 06/27/2024 Negative  Negative Final       Past Medical History:   Diagnosis Date    Diabetes mellitus, type 2     Kidney stone     PONV (postoperative nausea and vomiting)      Past Surgical History:   Procedure Laterality Date    ANTEGRADE NEPHROSTOGRAPHY Right 12/11/2020    Procedure: NEPHROSTOGRAM, ANTEGRADE;  Surgeon: Yuko Hawkins MD;  Location: [facility] OR;  Service: Urology;  Laterality: Right;    CYSTOSCOPY W/ URETERAL STENT PLACEMENT Right 11/24/2020    Procedure: CYSTOSCOPY, WITH URETERAL STENT INSERTION;  Surgeon: Yuko Hawkins MD;  Location: [facility] OR;  Service: Urology;  Laterality: Right;    CYSTOSCOPY W/ URETERAL STENT REMOVAL Right  12/23/2020    Procedure: CYSTOSCOPY, WITH URETERAL STENT REMOVAL;  Surgeon: Yuko Hawkins MD;  Location: [facility] OR;  Service: Urology;  Laterality: Right;    HYSTERECTOMY      OOPHORECTOMY      PERCUTANEOUS NEPHROLITHOTRIPSY Right 12/11/2020    Procedure: NEPHROLITHOTRIPSY, PERCUTANEOUS;  Surgeon: Yuko Hawkins MD;  Location: [facility] OR;  Service: Urology;  Laterality: Right;    PERCUTANEOUS NEPHROSTOMY Right 12/10/2020    Procedure: Creation, Nephrostomy, Percutaneous;  [facility];  Location: [facility] OR;  Service: General;  Laterality: Right;     Family History   Problem Relation Name Age of Onset    Heart failure Father      Breast cancer Sister      Hypertension Sister      Diabetes type II Sister      Heart failure Sister      COPD Maternal Grandmother      Diabetes type II Brother      Diabetes type II Brother         Marital Status: Single  Alcohol History:  reports no history of alcohol use.  Tobacco History:  reports that she has been smoking cigarettes. She has been exposed to tobacco smoke. She has never used smokeless tobacco.  Drug History:  reports no history of drug use.    Review of patient's allergies indicates:   Allergen Reactions    Metformin      Cold intolerance    Penicillins      I was young unsure of reaction         Current Outpatient Medications:     EScitalopram oxalate (LEXAPRO) 20 MG tablet, Take 1 tablet (20 mg total) by mouth once daily., Disp: 90 tablet, Rfl: 1    gabapentin (NEURONTIN) 100 MG capsule, Take 1 capsule (100 mg total) by mouth every evening., Disp: 90 capsule, Rfl: 1    pravastatin (PRAVACHOL) 10 MG tablet, Take 1 tablet (10 mg total) by mouth once daily., Disp: 90 tablet, Rfl: 3    traMADoL (ULTRAM) 50 mg tablet, Take 1 tablet (50 mg total) by mouth every 6 (six) hours., Disp: 28 tablet, Rfl: 0    traZODone (DESYREL) 50 MG tablet, Take 1 tablet (50 mg total) by mouth nightly as needed for Insomnia., Disp: 30 tablet, Rfl: 11    blood sugar  "diagnostic Strp, To check BG 2 times daily, to use with insurance preferred meter, Disp: 200 each, Rfl: 1    lancets Misc, To check BG 2 times daily, to use with insurance preferred meter, Disp: 200 each, Rfl: 1    tirzepatide (MOUNJARO) 5 mg/0.5 mL PnIj, Inject 5 mg into the skin every 7 days., Disp: 4 mL, Rfl: 1    TRUE METRIX AIR GLUCOSE METER Mis, USE AS DIRECTED TO CHECK BLOOD GLUCOSE TWICE DAILY, Disp: , Rfl:     Objective:      Vitals:    11/11/24 0725   BP: 122/60   Pulse: 70   Resp: 18   SpO2: 96%   Weight: 89.8 kg (198 lb)   Height: 5' 9" (1.753 m)     Physical Exam    General: No acute distress. Well-developed. Well-nourished.  Eyes: EOMI. Sclerae anicteric.  HENT: Normocephalic. Atraumatic. Nares patent. Moist oral mucosa.  Ears: Bilateral TMs clear. Bilateral EACs clear.  Cardiovascular: Regular rate. Regular rhythm. No murmurs. No rubs. No gallops. Normal S1, S2. Strong pedal pulses.  Respiratory: Normal respiratory effort. Clear to auscultation bilaterally. No rales. No rhonchi. No wheezing.  Abdomen: Soft. Non-tender. Non-distended. Normoactive bowel sounds.  Musculoskeletal: No  obvious deformity.  Extremities: No lower extremity edema.  Neurological: Alert & oriented x3. No slurred speech. Normal gait.  Psychiatric: Normal mood. Normal affect. Good insight. Good judgment.  Skin: Warm. Dry. No rash. Superficial bruising on skin.         Assessment:       Assessment & Plan    - A1C improved from 7.6 to 7.1, now at goal  - Discontinued glipizide due to adverse GI effects after 1 dose  - Pursuing GLP-1 agonist (Mounjaro) to further improve glycemic control; anticipate A1C in 6% range  - Considering Januvia as oral alternative if Mounjaro not approved  - Ruled out clotting disorder based on recent normal CBC  - Addressed leg cramping and skin bruising with conservative measures    SENILE PURPURA:  Explained senile purpura as cause of superficial bruising.  Tereza to apply Lac-Hydrin moisturizer to arms " for bruising.  Started Lac-Hydrin moisturizer, apply to arms as needed for bruising.    DIABETES:  Discussed potential side effects of GLP-1 agonists, including constipation and possible nausea.  Discontinued glipizide due to adverse effects.  Follow up in 3 months (February) for A1C recheck.    HYDRATION:  Tereza to increase hydration with water and sugar-free electrolyte beverages.    MEDICATIONS/SUPPLEMENTS:  Started women's multivitamin (over 50 formulation), take daily.       Plan:       Insomnia, unspecified type    Type 2 diabetes mellitus with hyperglycemia, without long-term current use of insulin  -     POCT HEMOGLOBIN A1C  -     Foot Exam Performed  -     tirzepatide (MOUNJARO) 5 mg/0.5 mL PnIj; Inject 5 mg into the skin every 7 days.  Dispense: 4 mL; Refill: 1    Anxiety and depression      Follow up in about 3 months (around 2/11/2025) for Diabetic Check-Up.    This note was generated with the assistance of ambient listening technology. Verbal consent was obtained by the patient and accompanying visitor(s) for the recording of patient appointment to facilitate this note. I attest to having reviewed and edited the generated note for accuracy, though some syntax or spelling errors may persist. Please contact the author of this note for any clarification.          11/11/2024 Jeyson Portillo PA-C

## 2024-11-13 ENCOUNTER — TELEPHONE (OUTPATIENT)
Dept: FAMILY MEDICINE | Facility: CLINIC | Age: 54
End: 2024-11-13
Payer: COMMERCIAL

## 2024-11-13 NOTE — TELEPHONE ENCOUNTER
----- Message from Lorri sent at 11/13/2024  1:30 PM CST -----  PA #[id number] for Mounjaro submitted on CMM with chart notes. Thank you

## 2024-12-04 DIAGNOSIS — Z12.31 OTHER SCREENING MAMMOGRAM: ICD-10-CM

## 2024-12-09 ENCOUNTER — PATIENT MESSAGE (OUTPATIENT)
Dept: ADMINISTRATIVE | Facility: HOSPITAL | Age: 54
End: 2024-12-09
Payer: COMMERCIAL

## 2025-01-07 ENCOUNTER — TELEPHONE (OUTPATIENT)
Dept: FAMILY MEDICINE | Facility: CLINIC | Age: 55
End: 2025-01-07
Payer: COMMERCIAL

## 2025-01-07 NOTE — TELEPHONE ENCOUNTER
----- Message from Lorri sent at 1/7/2025  8:42 AM CST -----  Pt has the flu and she needs something to make her feel better. Please advise. Pt #[phone]

## 2025-01-07 NOTE — TELEPHONE ENCOUNTER
She is really outside of the window in which tamiflu would help. But if she is absolutely demanding it we can send in 75 po BID for 5 days. Agree with supportive measures already discussed with her.

## 2025-01-07 NOTE — TELEPHONE ENCOUNTER
Sunday symptoms started, family members all tested +   Fever, congestion, body aches.  Instructed, rest, increased fluids, mucinex, and would ask about tamiflu

## 2025-01-15 DIAGNOSIS — E11.9 TYPE 2 DIABETES MELLITUS WITHOUT COMPLICATION: ICD-10-CM

## 2025-01-16 DIAGNOSIS — E11.65 TYPE 2 DIABETES MELLITUS WITH HYPERGLYCEMIA, WITHOUT LONG-TERM CURRENT USE OF INSULIN: ICD-10-CM

## 2025-01-16 RX ORDER — TIRZEPATIDE 5 MG/.5ML
5 INJECTION, SOLUTION SUBCUTANEOUS
Qty: 4 ML | Refills: 1 | Status: SHIPPED | OUTPATIENT
Start: 2025-01-16

## 2025-01-22 ENCOUNTER — PATIENT MESSAGE (OUTPATIENT)
Dept: ADMINISTRATIVE | Facility: HOSPITAL | Age: 55
End: 2025-01-22
Payer: COMMERCIAL

## 2025-01-23 ENCOUNTER — TELEPHONE (OUTPATIENT)
Dept: FAMILY MEDICINE | Facility: CLINIC | Age: 55
End: 2025-01-23
Payer: COMMERCIAL

## 2025-01-23 NOTE — TELEPHONE ENCOUNTER
----- Message from Danielle sent at 1/23/2025 10:22 AM CST -----  -9:59- pt needs prior auth on [facility]. Has not had it for 3 weeks   [phone] ref# [id number]    [phone]

## 2025-01-27 ENCOUNTER — PATIENT OUTREACH (OUTPATIENT)
Dept: ADMINISTRATIVE | Facility: HOSPITAL | Age: 55
End: 2025-01-27
Payer: COMMERCIAL

## 2025-01-27 DIAGNOSIS — E11.65 TYPE 2 DIABETES MELLITUS WITH HYPERGLYCEMIA, WITHOUT LONG-TERM CURRENT USE OF INSULIN: Primary | ICD-10-CM

## 2025-01-27 NOTE — PROGRESS NOTES
Population Health Chart Review & Patient Outreach Details      Additional [facility] Health Notes:               Updates Requested / Reviewed:      Updated Care Coordination Note, Care Everywhere, , and Immunizations Reconciliation Completed or Queried: [facility] Topics Overdue:      [facility] Score: 2     Colon Cancer Screening  Mammogram                       Health Maintenance Topic(s) Outreach Outcomes & Actions Taken:    Lab(s) - Outreach Outcomes & Actions Taken  : Overdue Lab(s) Ordered and Overdue Lab(s) Scheduled

## 2025-01-28 LAB
ALBUMIN/CREAT UR: 3 MG/G CREAT
CREAT UR-MCNC: 117 MG/DL (ref 20–275)
MICROALBUMIN UR-MCNC: 0.4 MG/DL

## 2025-02-11 ENCOUNTER — OFFICE VISIT (OUTPATIENT)
Dept: FAMILY MEDICINE | Facility: CLINIC | Age: 55
End: 2025-02-11
Payer: COMMERCIAL

## 2025-02-11 VITALS
BODY MASS INDEX: 26.96 KG/M2 | HEIGHT: 69 IN | OXYGEN SATURATION: 97 % | SYSTOLIC BLOOD PRESSURE: 130 MMHG | HEART RATE: 85 BPM | WEIGHT: 182 LBS | DIASTOLIC BLOOD PRESSURE: 70 MMHG | RESPIRATION RATE: 18 BRPM

## 2025-02-11 DIAGNOSIS — B35.1 ONYCHOMYCOSIS: ICD-10-CM

## 2025-02-11 DIAGNOSIS — Z12.39 ENCOUNTER FOR SCREENING FOR MALIGNANT NEOPLASM OF BREAST, UNSPECIFIED SCREENING MODALITY: ICD-10-CM

## 2025-02-11 DIAGNOSIS — E11.65 TYPE 2 DIABETES MELLITUS WITH HYPERGLYCEMIA, WITHOUT LONG-TERM CURRENT USE OF INSULIN: ICD-10-CM

## 2025-02-11 DIAGNOSIS — M65.311 TRIGGER FINGER OF RIGHT THUMB: Primary | ICD-10-CM

## 2025-02-11 LAB — HBA1C MFR BLD: 5.9 %

## 2025-02-11 RX ORDER — TIRZEPATIDE 5 MG/.5ML
5 INJECTION, SOLUTION SUBCUTANEOUS
Qty: 6 ML | Refills: 1 | Status: SHIPPED | OUTPATIENT
Start: 2025-02-11 | End: 2025-08-10

## 2025-02-11 RX ORDER — CICLOPIROX 80 MG/ML
SOLUTION TOPICAL NIGHTLY
Qty: 6.6 ML | Refills: 1 | Status: SHIPPED | OUTPATIENT
Start: 2025-02-11 | End: 2025-05-12

## 2025-02-11 NOTE — PROGRESS NOTES
SUBJECTIVE:    Patient ID: Tereza Weinstein is a 55 y.o. female.    Chief Complaint: Follow-up (Brought bottles// refills needed// pt states she's been having right hand/arm pain level 4 going on for 2 weeks //pt refused colon cancer screening and mammogram )    Ms. Weinstein is a 54 y/o F presenting for 3 month follow up. At last visit, she was started on Mounjaro and stopped glipizide due to GI side effects. Today, her A1c is down to 5.9% from 7.1%. Reports she is doing well with Mounjaro and is down about 20lbs. Continues to do well on Lexapro. Today, she has complaints of R thumb/arm pain with stiffness and popping of the thumb joint. She was seen by ortho last year and received steroid injection with much improvement, but symptoms have returned recently. Additionally, she reports she had a wound on the left buttock that was present for weeks. She drained it at home and reports it is improved now. Denies fevers, chills, rashes, pain, or itching.     Follow-up  Associated symptoms include arthralgias. Pertinent negatives include no abdominal pain, chest pain, chills, congestion, coughing, fatigue, headaches, nausea, sore throat, vomiting or weakness.       Orders Only on 01/15/2025   Component Date Value Ref Range Status    Creatinine, Urine 01/27/2025 117  20 - 275 mg/dL Final    Microalb, Ur 01/27/2025 0.4  See Note: mg/dL Final    Microalb/Creat Ratio 01/27/2025 3  <30 mg/g creat Final   Office Visit on 11/11/2024   Component Date Value Ref Range Status    Hemoglobin A1C, POC 11/11/2024 7.1  % Final   Hospital Outpatient Visit on 08/16/2024   Component Date Value Ref Range Status    85% Max Predicted HR 08/16/2024 139   Final    Max Predicted HR 08/16/2024 164   Final    OHS CV CPX PATIENT IS MALE 08/16/2024 0.0   Final    [facility] CV CPX PATIENT IS FEMALE 08/16/2024 1.0   Final    HR at rest 08/16/2024 66  bpm Final    Systolic blood pressure 08/16/2024 128  mmHg Final    Diastolic blood pressure 08/16/2024 80   mmHg Final    RPP 08/16/2024 8,448   Final    Exercise duration (min) 08/16/2024 5  minutes Final    Exercise duration (sec) 08/16/2024 39  seconds Final    Peak HR 08/16/2024 148  bpm Final    Peak Systolic BP 08/16/2024 196  mmHg Final    Peak Diatolic BP 08/16/2024 94  mmHg Final    Peak RPP 08/16/2024 29,008   Final    Estimated METs 08/16/2024 7   Final    % Max HR Achieved 08/16/2024 94   Final    1 Minute Recovery HR 08/16/2024 116  bpm Final       Past Medical History:   Diagnosis Date    Diabetes mellitus, type 2     Kidney stone     PONV (postoperative nausea and vomiting)      Past Surgical History:   Procedure Laterality Date    ANTEGRADE NEPHROSTOGRAPHY Right 12/11/2020    Procedure: NEPHROSTOGRAM, ANTEGRADE;  Surgeon: Yuko Hawkins MD;  Location: [facility];  Service: Urology;  Laterality: Right;    CYSTOSCOPY W/ URETERAL STENT PLACEMENT Right 11/24/2020    Procedure: CYSTOSCOPY, WITH URETERAL STENT INSERTION;  Surgeon: Yuko Hawkins MD;  Location: [facility] OR;  Service: Urology;  Laterality: Right;    CYSTOSCOPY W/ URETERAL STENT REMOVAL Right 12/23/2020    Procedure: CYSTOSCOPY, WITH URETERAL STENT REMOVAL;  Surgeon: Yuko Hawkins MD;  Location: [facility] OR;  Service: Urology;  Laterality: Right;    HYSTERECTOMY      OOPHORECTOMY      PERCUTANEOUS NEPHROLITHOTRIPSY Right 12/11/2020    Procedure: NEPHROLITHOTRIPSY, PERCUTANEOUS;  Surgeon: Yuko Hawkins MD;  Location: [facility] OR;  Service: Urology;  Laterality: Right;    PERCUTANEOUS NEPHROSTOMY Right 12/10/2020    Procedure: Creation, Nephrostomy, Percutaneous;  Surgeon: [facility] Diagnostic Provider;  Location: [facility] OR;  Service: General;  Laterality: Right;     Family History   Problem Relation Name Age of Onset    Heart failure Father      Breast cancer Sister      Hypertension Sister      Diabetes type II Sister      Heart failure Sister      COPD Maternal Grandmother      Diabetes type II Brother      Diabetes type II Brother         Tests  to Keep You Healthy    Mammogram: ORDERED BUT NOT SCHEDULED  Eye Exam: ORDERED BUT NOT SCHEDULED  Colon Cancer Screening: DUE  Last HbA1c < 8 (11/11/2024): Yes  Tobacco Cessation: NO      The 10-year CVD risk score (Vinnie, et al., 2008) is: 17.7%    Values used to calculate the score:      Age: 55 years      Sex: Female      Diabetic: Yes      Tobacco smoker: Yes      Systolic Blood Pressure: 130 mmHg      Is BP treated: No      HDL Cholesterol: 56 mg/dL      Total Cholesterol: 184 mg/dL     Marital Status: Single  Alcohol History:  reports no history of alcohol use.  Tobacco History:  reports that she has been smoking cigarettes. She has been exposed to tobacco smoke. She has never used smokeless tobacco.  Drug History:  reports no history of drug use.    Health Maintenance Topics with due status: Not Due       Topic Last Completion Date    Lipid Panel 04/22/2024    Foot Exam 11/11/2024    Hemoglobin A1c 11/11/2024    Diabetes Urine Screening 01/27/2025    Low Dose Statin 02/11/2025    RSV Vaccine (Age 60+ and Pregnant patients) Not Due     Immunization History   Administered Date(s) Administered    COVID-19, MRNA, LN-S, PF (Pfizer) (Purple Cap) 08/11/2021, 11/09/2021       Review of patient's allergies indicates:   Allergen Reactions    Metformin      Cold intolerance    Penicillins      I was young unsure of reaction         Current Outpatient Medications:     EScitalopram oxalate (LEXAPRO) 20 MG tablet, Take 1 tablet (20 mg total) by mouth once daily., Disp: 90 tablet, Rfl: 1    gabapentin (NEURONTIN) 100 MG capsule, Take 1 capsule (100 mg total) by mouth every evening., Disp: 90 capsule, Rfl: 1    pravastatin (PRAVACHOL) 10 MG tablet, Take 1 tablet (10 mg total) by mouth once daily., Disp: 90 tablet, Rfl: 3    traMADoL (ULTRAM) 50 mg tablet, Take 1 tablet (50 mg total) by mouth every 6 (six) hours., Disp: 28 tablet, Rfl: 0    traZODone (DESYREL) 50 MG tablet, Take 1 tablet (50 mg total) by mouth nightly as  "needed for Insomnia., Disp: 30 tablet, Rfl: 11    blood sugar diagnostic Strp, To check BG 2 times daily, to use with insurance preferred meter, Disp: 200 each, Rfl: 1    ciclopirox (PENLAC) 8 % Soln, Apply topically nightly., Disp: 6.6 mL, Rfl: 1    lancets Misc, To check BG 2 times daily, to use with insurance preferred meter, Disp: 200 each, Rfl: 1    tirzepatide (MOUNJARO) 5 mg/0.5 mL PnIj, Inject 5 mg into the skin every 7 days., Disp: 6 mL, Rfl: 1    TRUE METRIX AIR GLUCOSE METER Mis, USE AS DIRECTED TO CHECK BLOOD GLUCOSE TWICE DAILY, Disp: , Rfl:     Review of Systems   Constitutional:  Negative for activity change, chills, fatigue and unexpected weight change.   HENT:  Negative for congestion, postnasal drip, rhinorrhea and sore throat.    Eyes:  Negative for discharge and visual disturbance.   Respiratory:  Negative for cough and shortness of breath.    Cardiovascular:  Negative for chest pain.   Gastrointestinal:  Negative for abdominal pain, constipation, diarrhea, nausea and vomiting.   Genitourinary:  Negative for decreased urine volume and difficulty urinating.   Musculoskeletal:  Positive for arthralgias.   Skin:  Positive for wound.   Neurological:  Negative for dizziness, weakness, light-headedness and headaches.          Objective:      Vitals:    02/11/25 0736   BP: 130/70   Pulse: 85   Resp: 18   SpO2: 97%   Weight: 82.6 kg (182 lb)   Height: 5' 9" (1.753 m)     Physical Exam  Constitutional:       Appearance: Normal appearance.   HENT:      Head: Normocephalic and atraumatic.      Nose: Nose normal.      Mouth/Throat:      Mouth: Mucous membranes are moist.   Eyes:      Extraocular Movements: Extraocular movements intact.      Pupils: Pupils are equal, round, and reactive to light.   Cardiovascular:      Rate and Rhythm: Normal rate and regular rhythm.      Heart sounds: Normal heart sounds.   Pulmonary:      Effort: Pulmonary effort is normal.      Breath sounds: Normal breath sounds. "   Abdominal:      General: Abdomen is flat.      Palpations: Abdomen is soft.   Musculoskeletal:         General: Normal range of motion.      Cervical back: Normal range of motion.   Skin:     General: Skin is warm.      Capillary Refill: Capillary refill takes less than 2 seconds.   Neurological:      General: No focal deficit present.      Mental Status: She is alert and oriented to person, place, and time.   Psychiatric:         Mood and Affect: Mood normal.         Behavior: Behavior normal.           Assessment:       1. Trigger finger of right thumb    2. Type 2 diabetes mellitus with hyperglycemia, without long-term current use of insulin    3. Onychomycosis           Plan:       1. Trigger finger of right thumb  Comments:  New referral for ortho sent in  Orders:  -     Ambulatory referral/consult to Orthopedics; Future; Expected date: 02/11/2025    2. Type 2 diabetes mellitus with hyperglycemia, without long-term current use of insulin  Comments:  continue Mounjaro, A1c 5.9% today.  Orders:  -     POCT HEMOGLOBIN A1C  -     tirzepatide (MOUNJARO) 5 mg/0.5 mL PnIj; Inject 5 mg into the skin every 7 days.  Dispense: 6 mL; Refill: 1    3. Onychomycosis  Comments:  penlac sent in  Orders:  -     ciclopirox (PENLAC) 8 % Soln; Apply topically nightly.  Dispense: 6.6 mL; Refill: 1      Follow up in about 6 months (around 8/11/2025).          Counseled on age and gender appropriate medical preventative services, including cancer screenings, immunizations, overall nutritional health, need for a consistent exercise regimen and an overall push towards maintaining a vigorous and active lifestyle.      2/11/2025 Jeyson Portillo

## 2025-02-17 ENCOUNTER — OFFICE VISIT (OUTPATIENT)
Dept: ORTHOPEDICS | Facility: CLINIC | Age: 55
End: 2025-02-17
Payer: COMMERCIAL

## 2025-02-17 VITALS — HEIGHT: 69 IN | BODY MASS INDEX: 26.97 KG/M2 | WEIGHT: 182.13 LBS

## 2025-02-17 DIAGNOSIS — M65.311 TRIGGER FINGER OF RIGHT THUMB: Primary | ICD-10-CM

## 2025-02-17 PROCEDURE — 20550 NJX 1 TENDON SHEATH/LIGAMENT: CPT | Mod: RT,S$GLB,, | Performed by: PHYSICIAN ASSISTANT

## 2025-02-17 PROCEDURE — 99213 OFFICE O/P EST LOW 20 MIN: CPT | Mod: 25,S$GLB,, | Performed by: PHYSICIAN ASSISTANT

## 2025-02-17 RX ORDER — TRIAMCINOLONE ACETONIDE 40 MG/ML
40 INJECTION, SUSPENSION INTRA-ARTICULAR; INTRAMUSCULAR
Status: DISCONTINUED | OUTPATIENT
Start: 2025-02-17 | End: 2025-02-17 | Stop reason: HOSPADM

## 2025-02-17 RX ADMIN — TRIAMCINOLONE ACETONIDE 40 MG: 40 INJECTION, SUSPENSION INTRA-ARTICULAR; INTRAMUSCULAR at 02:02

## 2025-02-17 NOTE — PROCEDURES
Tendon Sheath    Date/Time: 2/17/2025 2:45 PM    Performed by: Benji Isidro PA  Authorized by: Benji Isidro PA    Consent Done?:  Yes (Verbal)  Indications:  Pain  Site marked: the procedure site was marked    Timeout: prior to procedure the correct patient, procedure, and site was verified    Prep: patient was prepped and draped in usual sterile fashion      Local anesthesia used?: Yes    Local anesthetic:  Lidocaine 1% without epinephrine  Location:  Thumb  Site:  R thumb flexor tendon sheath  Ultrasonic guidance for needle placement?: No    Needle size:  25 G  Medications:  40 mg triamcinolone acetonide 40 mg/mL  Patient tolerance:  Patient tolerated the procedure well with no immediate complications

## 2025-02-17 NOTE — PROGRESS NOTES
[facility] ORTHOPEDICS  [address]  [place] [address]  Phone: [phone]   [phone]    Patient's PCP: Jeyson Portillo PA-C  Referring Provider: No ref. provider found    Subjective:      Chief Complaint:   Chief Complaint   Patient presents with    Right Hand - Pain     Occurring for about a few weeks; last march had an injection which gave relief until now; constant throbbing/aching, sharp pain when using the hand; would like another inejction today        Past Medical History:   Diagnosis Date    Diabetes mellitus, type 2     Kidney stone     PONV (postoperative nausea and vomiting)        Past Surgical History:   Procedure Laterality Date    ANTEGRADE NEPHROSTOGRAPHY Right 12/11/2020    Procedure: NEPHROSTOGRAM, ANTEGRADE;  Surgeon: Yuko Hawkins MD;  Location: [facility];  Service: Urology;  Laterality: Right;    CYSTOSCOPY W/ URETERAL STENT PLACEMENT Right 11/24/2020    Procedure: CYSTOSCOPY, WITH URETERAL STENT INSERTION;  Surgeon: Yuko Hawkins MD;  Location: [facility] OR;  Service: Urology;  Laterality: Right;    CYSTOSCOPY W/ URETERAL STENT REMOVAL Right 12/23/2020    Procedure: CYSTOSCOPY, WITH URETERAL STENT REMOVAL;  Surgeon: Yuko Hawkins MD;  Location: [facility] OR;  Service: Urology;  Laterality: Right;    HYSTERECTOMY      OOPHORECTOMY      PERCUTANEOUS NEPHROLITHOTRIPSY Right 12/11/2020    Procedure: NEPHROLITHOTRIPSY, PERCUTANEOUS;  Surgeon: Yuko Hawkins MD;  Location: [facility] OR;  Service: Urology;  Laterality: Right;    PERCUTANEOUS NEPHROSTOMY Right 12/10/2020    Procedure: Creation, Nephrostomy, Percutaneous;  Surgeon: [facility] Diagnostic Provider;  Location: [facility];  Service: General;  Laterality: Right;       Current Outpatient Medications   Medication Sig    blood sugar diagnostic Strp To check BG 2 times daily, to use with insurance preferred meter    ciclopirox (PENLAC) 8 % Soln Apply topically nightly.    EScitalopram oxalate (LEXAPRO) 20 MG tablet  Take 1 tablet (20 mg total) by mouth once daily.    gabapentin (NEURONTIN) 100 MG capsule Take 1 capsule (100 mg total) by mouth every evening.    lancets Misc To check BG 2 times daily, to use with insurance preferred meter    pravastatin (PRAVACHOL) 10 MG tablet Take 1 tablet (10 mg total) by mouth once daily.    tirzepatide (MOUNJARO) 5 mg/0.5 mL PnIj Inject 5 mg into the skin every 7 days.    traMADoL (ULTRAM) 50 mg tablet Take 1 tablet (50 mg total) by mouth every 6 (six) hours.    traZODone (DESYREL) 50 MG tablet Take 1 tablet (50 mg total) by mouth nightly as needed for Insomnia.    TRUE METRIX AIR GLUCOSE METER Mis USE AS DIRECTED TO CHECK BLOOD GLUCOSE TWICE DAILY     No current facility-administered medications for this visit.       Review of patient's allergies indicates:   Allergen Reactions    Metformin      Cold intolerance    Penicillins      I was young unsure of reaction         Family History   Problem Relation Name Age of Onset    Heart failure Father      Breast cancer Sister      Hypertension Sister      Diabetes type II Sister      Heart failure Sister      COPD Maternal Grandmother      Diabetes type II Brother      Diabetes type II Brother         Social History[1]    Prior to meeting with the patient I reviewed the medical chart in [employer]. This included reviewing the previous progress notes from our office, review of the patient's last appointment with their primary care provider, review of any visits to the emergency room, and review of any pain management appointments or procedures.    History of present illness: 55 y.o. female,  returns to clinic today for the right thumb.  Seen and evaluated in the clinic last year March 2024 for trigger thumb.  Injected and did well until recently.  She presented to primary care for routine follow-up who has referred her back to us.       Review of Systems:    Constitutional: Negative for chills, fever and weight loss.   HENT: Negative for congestion.     Eyes: Negative for discharge and redness.   Respiratory: Negative for cough and shortness of breath.    Cardiovascular: Negative for chest pain.   Gastrointestinal: Negative for nausea and vomiting.   Musculoskeletal: See HPI.   Skin: Negative for rash.   Neurological: Negative for headaches.   Endo/Heme/Allergies: Does not bruise/bleed easily.   Psychiatric/Behavioral: The patient is not nervous/anxious.    All other systems reviewed and are negative.       Objective:      Physical Examination:    Vital Signs:  There were no vitals filed for this visit.    Body mass index is 26.89 kg/m².    This a well-developed, well nourished patient in no acute distress.  They are alert and oriented and cooperative to examination.     Examination of the right thumb, she is tender over the A1 pulley, palpable and audible triggering today.  Easily reducible.      Pertinent New Results:        XRAY Report / Interpretation:       Procedure/s:  Tendon Sheath    Date/Time: 2/17/2025 2:45 PM    Performed by: Benji Isidro PA  Authorized by: Benji Isidro PA    Consent Done?:  Yes (Verbal)  Indications:  Pain  Site marked: the procedure site was marked    Timeout: prior to procedure the correct patient, procedure, and site was verified    Prep: patient was prepped and draped in usual sterile fashion      Local anesthesia used?: Yes    Local anesthetic:  Lidocaine 1% without epinephrine  Location:  Thumb  Site:  R thumb flexor tendon sheath  Ultrasonic guidance for needle placement?: No    Needle size:  25 G  Medications:  40 mg triamcinolone acetonide 40 mg/mL  Patient tolerance:  Patient tolerated the procedure well with no immediate complications           Assessment:       1. Trigger finger of right thumb      Plan:     Trigger finger of right thumb  -     Tendon Sheath        Follow up in about 2 months (around 4/17/2025) for R thumb trigger finger 2/17/25, discuss sx.    Right trigger thumb, it has been a year since  we injected her last.  We discussed trigger thumb release but she just started a new job, she would like to get settled into the job and if symptoms return she understands the recommendation is for trigger finger release.  We injected the right thumb today volar aspect A1 pulley lidocaine and triamcinolone sterile technique she tolerated well.  She will follow up with us in 2 months if she is doing well she can simply cancel in follow-up as needed.    The patient and I had a thorough discussion today.  We discussed the working diagnosis as well as several other potential alternative diagnoses.  We discussed treatment options, both conservative and surgical.  Conservative treatment options would include things such as activity modifications, workplace modifications, a period of rest, oral versus topical over the counter and oral versus topical prescription anti-inflammatory medications, physical therapy / occupational therapy, splinting / bracing, immobilization, corticosteroid injections, and others.        LIZ Grullon, CONSTANTINO        EMR Statement:  Please note that portions of this patient encounter record were imported from the patients electronic medical record and that others were dictated using voice recognition software. For these reasons grammatical errors, nonsensical language, and apparently contradictory statements may be present.  These should be disregarded or interpreted with respect to the context of the document.         [1]   Social History  Socioeconomic History    Marital status: Single   Tobacco Use    Smoking status: Some Days     Current packs/day: 0.50     Types: Cigarettes     Passive exposure: Current    Smokeless tobacco: Never   Substance and Sexual Activity    Alcohol use: No    Drug use: No     Social Drivers of Health     Financial Resource Strain: Medium Risk (9/6/2022)    Overall Financial Resource Strain (CARDI)     Difficulty of Paying Living Expenses: Somewhat hard   Food  Insecurity: No Food Insecurity (9/6/2022)    Hunger Vital Sign     Worried About Running Out of Food in the Last Year: Never true     Ran Out of Food in the Last Year: Never true   Transportation Needs: No Transportation Needs (9/6/2022)    PRAPARE - Transportation     Lack of Transportation (Medical): No     Lack of Transportation (Non-Medical): No   Physical Activity: Sufficiently Active (9/6/2022)    Exercise Vital Sign     Days of Exercise per Week: 5 days     Minutes of Exercise per Session: 30 min   Stress: Stress Concern Present (9/6/2022)    [employer] - Occupational Stress Questionnaire     Feeling of Stress : To some extent   Housing Stability: Low Risk  (9/6/2022)    Housing Stability Vital Sign     Unable to Pay for Housing in the Last Year: No     Number of Places Lived in the Last Year: 1     Unstable Housing in the Last Year: No

## 2025-04-09 ENCOUNTER — TELEPHONE (OUTPATIENT)
Dept: FAMILY MEDICINE | Facility: CLINIC | Age: 55
End: 2025-04-09
Payer: COMMERCIAL

## 2025-04-18 ENCOUNTER — HOSPITAL ENCOUNTER (EMERGENCY)
Facility: HOSPITAL | Age: 55
Discharge: HOME | End: 2025-04-18
Attending: EMERGENCY MEDICINE
Payer: COMMERCIAL

## 2025-04-18 VITALS
HEART RATE: 62 BPM | RESPIRATION RATE: 18 BRPM | BODY MASS INDEX: 28.14 KG/M2 | DIASTOLIC BLOOD PRESSURE: 71 MMHG | HEIGHT: 69 IN | SYSTOLIC BLOOD PRESSURE: 138 MMHG | OXYGEN SATURATION: 95 % | TEMPERATURE: 99 F | WEIGHT: 190 LBS

## 2025-04-18 DIAGNOSIS — S40.012A CONTUSION OF LEFT SHOULDER, INITIAL ENCOUNTER: ICD-10-CM

## 2025-04-18 DIAGNOSIS — S80.219A ABRASION OF KNEE, UNSPECIFIED LATERALITY, INITIAL ENCOUNTER: Primary | ICD-10-CM

## 2025-04-18 DIAGNOSIS — R52 PAIN: ICD-10-CM

## 2025-04-18 PROCEDURE — 99283 EMERGENCY DEPT VISIT LOW MDM: CPT | Mod: 25

## 2025-04-18 RX ORDER — OXYCODONE AND ACETAMINOPHEN 5; 325 MG/1; MG/1
1 TABLET ORAL
Refills: 0 | Status: DISCONTINUED | OUTPATIENT
Start: 2025-04-18 | End: 2025-04-18 | Stop reason: HOSPADM

## 2025-04-19 NOTE — ED PROVIDER NOTES
Encounter Date: 4/18/2025       History     Chief Complaint   Patient presents with    Arm Injury     Left, trip and fall, from shoulder to hand. Denies hitting head     Patient was pumping gas.  She reports she tripped over gas hose and fell to the ground.  She complains of bilateral knee abrasions left shoulder/arm pain.  No head injury, no loss of consciousness.  No syncope or seizure-like activity.      Review of patient's allergies indicates:   Allergen Reactions    Metformin      Cold intolerance    Penicillins      I was young unsure of reaction       Past Medical History:   Diagnosis Date    Diabetes mellitus, type 2     Kidney stone     PONV (postoperative nausea and vomiting)      Past Surgical History:   Procedure Laterality Date    ANTEGRADE NEPHROSTOGRAPHY Right 12/11/2020    Procedure: NEPHROSTOGRAM, ANTEGRADE;  Surgeon: Yuko Hawkins MD;  Location: [facility];  Service: Urology;  Laterality: Right;    CYSTOSCOPY W/ URETERAL STENT PLACEMENT Right 11/24/2020    Procedure: CYSTOSCOPY, WITH URETERAL STENT INSERTION;  Surgeon: Yuko Hawkins MD;  Location: [facility] OR;  Service: Urology;  Laterality: Right;    CYSTOSCOPY W/ URETERAL STENT REMOVAL Right 12/23/2020    Procedure: CYSTOSCOPY, WITH URETERAL STENT REMOVAL;  Surgeon: Yuko Hawkins MD;  Location: [facility] OR;  Service: Urology;  Laterality: Right;    HYSTERECTOMY      OOPHORECTOMY      PERCUTANEOUS NEPHROLITHOTRIPSY Right 12/11/2020    Procedure: NEPHROLITHOTRIPSY, PERCUTANEOUS;  Surgeon: Yuko Hawkins MD;  Location: [facility] OR;  Service: Urology;  Laterality: Right;    PERCUTANEOUS NEPHROSTOMY Right 12/10/2020    Procedure: Creation, Nephrostomy, Percutaneous;  Surgeon: [facility] Diagnostic Provider;  Location: [facility] OR;  Service: General;  Laterality: Right;     Family History   Problem Relation Name Age of Onset    Heart failure Father      Breast cancer Sister      Hypertension Sister      Diabetes type II Sister      Heart failure Sister       COPD Maternal Grandmother      Diabetes type II Brother      Diabetes type II Brother       Social History[1]  Review of Systems   Constitutional:  Negative for chills and fever.   HENT:  Negative for congestion.    Respiratory:  Negative for shortness of breath.    Cardiovascular:  Negative for chest pain and palpitations.   Gastrointestinal:  Negative for abdominal pain and vomiting.   Genitourinary:  Negative for dysuria.   Musculoskeletal:  Negative for back pain.   Skin:  Positive for wound.   Neurological:  Negative for headaches.   Psychiatric/Behavioral:  Negative for confusion.        Physical Exam     Initial Vitals [04/18/25 1839]   BP Pulse Resp Temp SpO2   (!) 163/90 68 18 98.6 °F (37 °C) 98 %      MAP       --         Physical Exam    Nursing note and vitals reviewed.  Constitutional: She is not diaphoretic. No distress.   HENT:   Head: Normocephalic and atraumatic.   No scalp hematomas or evidence of trauma   Eyes: Conjunctivae and EOM are normal.   Neck:   No neck pain.  No cervical spine tenderness   Normal range of motion.  Cardiovascular:  Normal rate.           Pulmonary/Chest: Breath sounds normal.   Abdominal: Abdomen is soft. There is no abdominal tenderness.   Musculoskeletal:      Cervical back: Normal range of motion.      Comments: Bilateral knee abrasions.  Full range of motion both legs with no significant bony tenderness.  2+ dorsalis pedis and posterior tibial pulses.  There is some palpable tenderness to left trapezius with spasm.  Some tenderness to left deltoid with no deformity.  Left arm with full range of motion at fingers wrist elbows with no pain.  Slight tenderness to left distal radius.  No snuffbox tenderness.  Capillary refill less than 2 seconds.  Sensation intact     Neurological: She is alert.   No gross deficits   Skin: No rash noted.   Psychiatric: She has a normal mood and affect.         ED Course   Procedures  Labs Reviewed - No data to display       Imaging  Results              X-Ray Shoulder 2 or More Views Left (Final result)  Result time 04/18/25 20:10:10      Final result by Mikie Mejia DO (04/18/25 20:10:10)                   Impression:      No acute osseous abnormality of the shoulder, humerus, forearm, or wrist.      Electronically signed by: Mikie Mejia  Date:    04/18/2025  Time:    20:10               Narrative:    EXAMINATION:  XR SHOULDER COMPLETE 2 OR MORE VIEWS LEFT; XR HUMERUS 2 VIEW LEFT; XR FOREARM LEFT; XR WRIST COMPLETE 3 VIEWS LEFT    CLINICAL HISTORY:  pain;Pain, unspecified    TECHNIQUE:  Two or three views of the left shoulder were performed.    AP and lateral radiographs of the left humerus.    AP and lateral radiographs of the left forearm.    PA, oblique, and lateral radiographs of the left wrist.    COMPARISON:  None    FINDINGS:  There is osteopenia.    Left shoulder: There is no acute fracture or dislocation of the left shoulder.  Alignment is normal.  The humeral head is well seated in the glenoid.  There is mild joint space narrowing of the acromioclavicular and glenohumeral joints.  Remaining osseous structures are intact.    Left humerus: No acute fracture or dislocation.  Alignment is normal.  Joint spaces are preserved.  Soft tissues are unremarkable.    Left forearm: No acute fracture or dislocation of the forearm.  Alignment is normal.  Joint spaces are preserved.  Soft tissues are unremarkable.    Left wrist: No acute fracture or dislocation. Alignment is normal. Joint spaces are preserved.                                       X-Ray Forearm Left (Final result)  Result time 04/18/25 20:10:10      Final result by Mikie Mejia DO (04/18/25 20:10:10)                   Impression:      No acute osseous abnormality of the shoulder, humerus, forearm, or wrist.      Electronically signed by: Mikie Mejia  Date:    04/18/2025  Time:    20:10               Narrative:    EXAMINATION:  XR SHOULDER COMPLETE 2 OR MORE VIEWS  LEFT; XR HUMERUS 2 VIEW LEFT; XR FOREARM LEFT; XR WRIST COMPLETE 3 VIEWS LEFT    CLINICAL HISTORY:  pain;Pain, unspecified    TECHNIQUE:  Two or three views of the left shoulder were performed.    AP and lateral radiographs of the left humerus.    AP and lateral radiographs of the left forearm.    PA, oblique, and lateral radiographs of the left wrist.    COMPARISON:  None    FINDINGS:  There is osteopenia.    Left shoulder: There is no acute fracture or dislocation of the left shoulder.  Alignment is normal.  The humeral head is well seated in the glenoid.  There is mild joint space narrowing of the acromioclavicular and glenohumeral joints.  Remaining osseous structures are intact.    Left humerus: No acute fracture or dislocation.  Alignment is normal.  Joint spaces are preserved.  Soft tissues are unremarkable.    Left forearm: No acute fracture or dislocation of the forearm.  Alignment is normal.  Joint spaces are preserved.  Soft tissues are unremarkable.    Left wrist: No acute fracture or dislocation. Alignment is normal. Joint spaces are preserved.                                       X-Ray Humerus 2 View Left (Final result)  Result time 04/18/25 20:10:10      Final result by Mikie Mejia DO (04/18/25 20:10:10)                   Impression:      No acute osseous abnormality of the shoulder, humerus, forearm, or wrist.      Electronically signed by: Mikie Mejia  Date:    04/18/2025  Time:    20:10               Narrative:    EXAMINATION:  XR SHOULDER COMPLETE 2 OR MORE VIEWS LEFT; XR HUMERUS 2 VIEW LEFT; XR FOREARM LEFT; XR WRIST COMPLETE 3 VIEWS LEFT    CLINICAL HISTORY:  pain;Pain, unspecified    TECHNIQUE:  Two or three views of the left shoulder were performed.    AP and lateral radiographs of the left humerus.    AP and lateral radiographs of the left forearm.    PA, oblique, and lateral radiographs of the left wrist.    COMPARISON:  None    FINDINGS:  There is osteopenia.    Left shoulder:  There is no acute fracture or dislocation of the left shoulder.  Alignment is normal.  The humeral head is well seated in the glenoid.  There is mild joint space narrowing of the acromioclavicular and glenohumeral joints.  Remaining osseous structures are intact.    Left humerus: No acute fracture or dislocation.  Alignment is normal.  Joint spaces are preserved.  Soft tissues are unremarkable.    Left forearm: No acute fracture or dislocation of the forearm.  Alignment is normal.  Joint spaces are preserved.  Soft tissues are unremarkable.    Left wrist: No acute fracture or dislocation. Alignment is normal. Joint spaces are preserved.                                       X-Ray Wrist Complete Left (Final result)  Result time 04/18/25 20:10:10      Final result by Mikie Mejia DO (04/18/25 20:10:10)                   Impression:      No acute osseous abnormality of the shoulder, humerus, forearm, or wrist.      Electronically signed by: Mikie Mejia  Date:    04/18/2025  Time:    20:10               Narrative:    EXAMINATION:  XR SHOULDER COMPLETE 2 OR MORE VIEWS LEFT; XR HUMERUS 2 VIEW LEFT; XR FOREARM LEFT; XR WRIST COMPLETE 3 VIEWS LEFT    CLINICAL HISTORY:  pain;Pain, unspecified    TECHNIQUE:  Two or three views of the left shoulder were performed.    AP and lateral radiographs of the left humerus.    AP and lateral radiographs of the left forearm.    PA, oblique, and lateral radiographs of the left wrist.    COMPARISON:  None    FINDINGS:  There is osteopenia.    Left shoulder: There is no acute fracture or dislocation of the left shoulder.  Alignment is normal.  The humeral head is well seated in the glenoid.  There is mild joint space narrowing of the acromioclavicular and glenohumeral joints.  Remaining osseous structures are intact.    Left humerus: No acute fracture or dislocation.  Alignment is normal.  Joint spaces are preserved.  Soft tissues are unremarkable.    Left forearm: No acute fracture  or dislocation of the forearm.  Alignment is normal.  Joint spaces are preserved.  Soft tissues are unremarkable.    Left wrist: No acute fracture or dislocation. Alignment is normal. Joint spaces are preserved.                                       Medications   oxyCODONE-acetaminophen 5-325 mg per tablet 1 tablet (1 tablet Oral Not Given 4/18/25 2100)     Medical Decision Making  Patient presents status post fall.  X-ray of left shoulder and arm unremarkable.  There is no bony tenderness.  Patient does have palpable muscle spasm to left trapezius with some tenderness around deltoid.  Will begin analgesics.  Wound care provided to both knees.  Discussed x-ray of knees.  Patient reports she does not want x-rays of the knees.  I have no convincing evidence of fracture.  Patient is stable for discharge.    Amount and/or Complexity of Data Reviewed  Radiology:  Decision-making details documented in ED Course.    Risk  Prescription drug management.                                      Clinical Impression:  Final diagnoses:  [S80.219A] Abrasion of knee, unspecified laterality, initial encounter (Primary)  [S40.012A] Contusion of left shoulder, initial encounter          ED Disposition Condition    Discharge Good          ED Prescriptions    None       Follow-up Information       Follow up With Specialties Details Why Contact Info Additional Information    [facility] - Emergency Dept Emergency Medicine  If symptoms worsen [address] [id number] [phone] 1st floor    Jeyson Portillo PA-C Family Medicine Schedule an appointment as soon as possible for a visit   [address]  [facility] [address]  [phone]                    [1]   Social History  Tobacco Use    Smoking status: Some Days     Current packs/day: 0.50     Types: Cigarettes     Passive exposure: Current    Smokeless tobacco: Never   Substance Use Topics    Alcohol use: No    Drug use: No         Gabe Estrada MD  04/18/25 [id number]

## 2025-05-23 ENCOUNTER — PATIENT MESSAGE (OUTPATIENT)
Dept: ADMINISTRATIVE | Facility: HOSPITAL | Age: 55
End: 2025-05-23
Payer: COMMERCIAL

## 2025-06-26 DIAGNOSIS — E11.65 TYPE 2 DIABETES MELLITUS WITH HYPERGLYCEMIA, WITHOUT LONG-TERM CURRENT USE OF INSULIN: ICD-10-CM

## 2025-06-26 DIAGNOSIS — F41.9 ANXIETY AND DEPRESSION: ICD-10-CM

## 2025-06-26 DIAGNOSIS — F32.A ANXIETY AND DEPRESSION: ICD-10-CM

## 2025-06-27 RX ORDER — TIRZEPATIDE 5 MG/.5ML
5 INJECTION, SOLUTION SUBCUTANEOUS
Qty: 6 ML | Refills: 1 | Status: SHIPPED | OUTPATIENT
Start: 2025-06-27 | End: 2025-12-24

## 2025-06-27 RX ORDER — ESCITALOPRAM OXALATE 20 MG/1
20 TABLET ORAL DAILY
Qty: 90 TABLET | Refills: 1 | Status: SHIPPED | OUTPATIENT
Start: 2025-06-27

## 2025-07-30 ENCOUNTER — TELEPHONE (OUTPATIENT)
Dept: FAMILY MEDICINE | Facility: CLINIC | Age: 55
End: 2025-07-30
Payer: COMMERCIAL

## 2025-07-30 DIAGNOSIS — Z00.00 ANNUAL PHYSICAL EXAM: ICD-10-CM

## 2025-07-30 DIAGNOSIS — E83.52 HYPERCALCEMIA: ICD-10-CM

## 2025-07-30 DIAGNOSIS — E11.65 TYPE 2 DIABETES MELLITUS WITH HYPERGLYCEMIA, WITHOUT LONG-TERM CURRENT USE OF INSULIN: Primary | ICD-10-CM

## 2025-07-30 DIAGNOSIS — Z79.899 ENCOUNTER FOR LONG-TERM (CURRENT) USE OF MEDICATIONS: ICD-10-CM

## 2025-08-04 RX ORDER — SULINDAC 200 MG/1
200 TABLET ORAL 2 TIMES DAILY
COMMUNITY
Start: 2025-04-27 | End: 2025-08-12

## 2025-08-12 ENCOUNTER — OFFICE VISIT (OUTPATIENT)
Dept: FAMILY MEDICINE | Facility: CLINIC | Age: 55
End: 2025-08-12
Payer: COMMERCIAL

## 2025-08-12 VITALS
OXYGEN SATURATION: 98 % | BODY MASS INDEX: 26.36 KG/M2 | SYSTOLIC BLOOD PRESSURE: 120 MMHG | WEIGHT: 178 LBS | RESPIRATION RATE: 18 BRPM | HEART RATE: 65 BPM | HEIGHT: 69 IN | DIASTOLIC BLOOD PRESSURE: 62 MMHG

## 2025-08-12 DIAGNOSIS — Z12.31 ENCOUNTER FOR SCREENING MAMMOGRAM FOR MALIGNANT NEOPLASM OF BREAST: ICD-10-CM

## 2025-08-12 DIAGNOSIS — F32.A ANXIETY AND DEPRESSION: ICD-10-CM

## 2025-08-12 DIAGNOSIS — F41.9 ANXIETY AND DEPRESSION: ICD-10-CM

## 2025-08-12 DIAGNOSIS — B35.1 ONYCHOMYCOSIS: ICD-10-CM

## 2025-08-12 DIAGNOSIS — Z12.11 SCREENING FOR COLON CANCER: ICD-10-CM

## 2025-08-12 DIAGNOSIS — G47.00 INSOMNIA, UNSPECIFIED TYPE: ICD-10-CM

## 2025-08-12 DIAGNOSIS — E11.65 TYPE 2 DIABETES MELLITUS WITH HYPERGLYCEMIA, WITHOUT LONG-TERM CURRENT USE OF INSULIN: Primary | ICD-10-CM

## 2025-08-12 RX ORDER — PRAVASTATIN SODIUM 10 MG/1
10 TABLET ORAL DAILY
Qty: 90 TABLET | Refills: 3 | Status: SHIPPED | OUTPATIENT
Start: 2025-08-12 | End: 2026-08-12

## 2025-08-12 RX ORDER — TRAZODONE HYDROCHLORIDE 50 MG/1
50 TABLET ORAL NIGHTLY PRN
Qty: 30 TABLET | Refills: 11 | Status: SHIPPED | OUTPATIENT
Start: 2025-08-12 | End: 2026-08-12

## 2025-08-12 RX ORDER — GABAPENTIN 100 MG/1
100 CAPSULE ORAL NIGHTLY
Qty: 90 CAPSULE | Refills: 1 | Status: SHIPPED | OUTPATIENT
Start: 2025-08-12 | End: 2026-02-08

## 2025-08-12 RX ORDER — CICLOPIROX 80 MG/ML
SOLUTION TOPICAL NIGHTLY
Qty: 6.6 ML | Refills: 1 | Status: SHIPPED | OUTPATIENT
Start: 2025-08-12 | End: 2025-11-10

## 2025-08-19 ENCOUNTER — PATIENT MESSAGE (OUTPATIENT)
Dept: FAMILY MEDICINE | Facility: CLINIC | Age: 55
End: 2025-08-19
Payer: COMMERCIAL

## 2025-08-19 DIAGNOSIS — D72.829 LEUKOCYTOSIS, UNSPECIFIED TYPE: ICD-10-CM

## 2025-08-19 DIAGNOSIS — B35.1 ONYCHOMYCOSIS: Primary | ICD-10-CM

## 2025-08-19 RX ORDER — FLUCONAZOLE 150 MG/1
150 TABLET ORAL DAILY
Qty: 1 TABLET | Refills: 0 | Status: SHIPPED | OUTPATIENT
Start: 2025-08-19 | End: 2025-08-20

## 2025-08-26 ENCOUNTER — TELEPHONE (OUTPATIENT)
Dept: FAMILY MEDICINE | Facility: CLINIC | Age: 55
End: 2025-08-26
Payer: COMMERCIAL

## (undated) DEVICE — SUT SILK 0 STRANDS 30IN BLK

## (undated) DEVICE — SOL IRR NACL .9% 3000ML

## (undated) DEVICE — APPLICATOR CHLORAPREP ORN 26ML

## (undated) DEVICE — SLEEVE SCD EXPRESS CALF MEDIUM

## (undated) DEVICE — CATH URETERAL DUAL LUMEN 10FR

## (undated) DEVICE — SOL 9P NACL IRR PIC IL

## (undated) DEVICE — SYR 10CC LUER LOCK

## (undated) DEVICE — LUBRICANT SURGILUBE 2 OZ

## (undated) DEVICE — DRESSING TRANS 6X8 TEGADERM

## (undated) DEVICE — GLOVE SURGEONS ULTRA TOUCH 6.5

## (undated) DEVICE — SEE MEDLINE ITEM 146292

## (undated) DEVICE — GUIDEWIRE URO STRT FLEXIBLE TIP .035X150

## (undated) DEVICE — SOLUTION IRRI H2O BOTTLE 1000ML

## (undated) DEVICE — PACK CYSTOSCOPY III

## (undated) DEVICE — UNDERGLOVE BIOGEL PI MICRO BLUE SZ 6.5

## (undated) DEVICE — GUIDEWIRE AMPLATZ .035X145 STR

## (undated) DEVICE — GUIDE WIRE MOTION .035 X 150CM

## (undated) DEVICE — TUBING CYSTO DOUBLE 654301

## (undated) DEVICE — SYR 50ML CATH TIP

## (undated) DEVICE — Device

## (undated) DEVICE — SEE MEDLINE ITEM 146313

## (undated) DEVICE — SET CYSTO IRRIGATION UNIV SPIK

## (undated) DEVICE — SCRUB BACTOSHIELD 134439

## (undated) DEVICE — GLOVE #7.5 BIOGEL 30475

## (undated) DEVICE — SEE MEDLINE ITEM 152651

## (undated) DEVICE — PACK CUSTOM UNIV BASIN SLI

## (undated) DEVICE — BLADE SURG CARBON STEEL SZ11

## (undated) DEVICE — SEE L#95700

## (undated) DEVICE — SPONGE SUPER KERLIX 6X6.75IN

## (undated) DEVICE — SET IRR URLGY 2LINE UNIV SPIKE

## (undated) DEVICE — STRAP TABLE 5X72 M5173

## (undated) DEVICE — SOLUTION H2O IRRIGATION 3000ML R8006

## (undated) DEVICE — DRAPE C ARM 42 X 120 10/BX

## (undated) DEVICE — TUBING SUCTION 12' ST2003

## (undated) DEVICE — PROBE SHOCK PULSE 3.76MM URO

## (undated) DEVICE — STRAP OR TABLE 5IN X 72IN

## (undated) DEVICE — MAT QUICK 40X30 FLOOR FLUID LF

## (undated) DEVICE — MANIFOLD 4 PORT

## (undated) DEVICE — CATHETER URETERAL OPEN TIP 5FX70

## (undated) DEVICE — SEE MEDLINE ITEM 157117

## (undated) DEVICE — SUT 2-0 ETHILON 18 FS

## (undated) DEVICE — SUT 3-0 VICRYL / SH (J416)

## (undated) DEVICE — SEE MEDLINE ITEM 157149

## (undated) DEVICE — SEE ITEM #152308

## (undated) DEVICE — BLLN ULTRAXX NEPHSTMY 6MM 15CM